# Patient Record
Sex: FEMALE | Race: WHITE | NOT HISPANIC OR LATINO
[De-identification: names, ages, dates, MRNs, and addresses within clinical notes are randomized per-mention and may not be internally consistent; named-entity substitution may affect disease eponyms.]

---

## 2017-07-14 ENCOUNTER — APPOINTMENT (OUTPATIENT)
Dept: OTOLARYNGOLOGY | Facility: CLINIC | Age: 82
End: 2017-07-14

## 2017-07-14 VITALS — BODY MASS INDEX: 26.21 KG/M2 | RESPIRATION RATE: 18 BRPM | WEIGHT: 130 LBS | HEIGHT: 59 IN

## 2017-07-14 DIAGNOSIS — Z83.3 FAMILY HISTORY OF DIABETES MELLITUS: ICD-10-CM

## 2017-07-14 DIAGNOSIS — Z82.3 FAMILY HISTORY OF STROKE: ICD-10-CM

## 2017-07-14 DIAGNOSIS — E11.22 TYPE 2 DIABETES MELLITUS WITH DIABETIC CHRONIC KIDNEY DISEASE: ICD-10-CM

## 2017-07-14 DIAGNOSIS — I10 ESSENTIAL (PRIMARY) HYPERTENSION: ICD-10-CM

## 2017-07-14 DIAGNOSIS — R04.0 EPISTAXIS: ICD-10-CM

## 2017-07-14 DIAGNOSIS — Z87.891 PERSONAL HISTORY OF NICOTINE DEPENDENCE: ICD-10-CM

## 2017-07-14 RX ORDER — AMLODIPINE BESYLATE 5 MG/1
TABLET ORAL
Refills: 0 | Status: ACTIVE | COMMUNITY

## 2017-07-14 RX ORDER — GLIMEPIRIDE 4 MG/1
TABLET ORAL
Refills: 0 | Status: ACTIVE | COMMUNITY

## 2017-07-14 RX ORDER — TRAZODONE HYDROCHLORIDE 300 MG/1
TABLET ORAL
Refills: 0 | Status: ACTIVE | COMMUNITY

## 2017-07-14 RX ORDER — OMEPRAZOLE 20 MG/1
TABLET, DELAYED RELEASE ORAL
Refills: 0 | Status: ACTIVE | COMMUNITY

## 2017-07-17 PROBLEM — R04.0 EPISTAXIS, RECURRENT: Status: ACTIVE | Noted: 2017-07-17

## 2017-11-17 ENCOUNTER — INPATIENT (INPATIENT)
Facility: HOSPITAL | Age: 82
LOS: 3 days | Discharge: HOME | DRG: 312 | End: 2017-11-21
Attending: INTERNAL MEDICINE | Admitting: INTERNAL MEDICINE
Payer: MEDICARE

## 2017-11-17 ENCOUNTER — EMERGENCY (EMERGENCY)
Facility: HOSPITAL | Age: 82
LOS: 1 days | Discharge: ANOTHER HOSPITAL | End: 2017-11-17
Attending: EMERGENCY MEDICINE | Admitting: EMERGENCY MEDICINE
Payer: MEDICARE

## 2017-11-17 VITALS
RESPIRATION RATE: 16 BRPM | OXYGEN SATURATION: 99 % | DIASTOLIC BLOOD PRESSURE: 80 MMHG | HEART RATE: 86 BPM | SYSTOLIC BLOOD PRESSURE: 155 MMHG | TEMPERATURE: 98 F

## 2017-11-17 VITALS
HEART RATE: 93 BPM | RESPIRATION RATE: 14 BRPM | HEIGHT: 59 IN | OXYGEN SATURATION: 96 % | DIASTOLIC BLOOD PRESSURE: 83 MMHG | TEMPERATURE: 98 F | SYSTOLIC BLOOD PRESSURE: 199 MMHG | WEIGHT: 130.07 LBS

## 2017-11-17 VITALS
HEART RATE: 71 BPM | RESPIRATION RATE: 18 BRPM | SYSTOLIC BLOOD PRESSURE: 165 MMHG | OXYGEN SATURATION: 97 % | DIASTOLIC BLOOD PRESSURE: 70 MMHG

## 2017-11-17 LAB
ALBUMIN SERPL ELPH-MCNC: 3.2 G/DL — LOW (ref 3.3–5)
ALP SERPL-CCNC: 52 U/L — SIGNIFICANT CHANGE UP (ref 40–120)
ALT FLD-CCNC: 23 U/L — SIGNIFICANT CHANGE UP (ref 12–78)
ANION GAP SERPL CALC-SCNC: 8 MMOL/L — SIGNIFICANT CHANGE UP (ref 5–17)
APTT BLD: 28.8 SEC — SIGNIFICANT CHANGE UP (ref 27.5–37.4)
AST SERPL-CCNC: 18 U/L — SIGNIFICANT CHANGE UP (ref 15–37)
BASOPHILS # BLD AUTO: 0.1 K/UL — SIGNIFICANT CHANGE UP (ref 0–0.2)
BASOPHILS NFR BLD AUTO: 1 % — SIGNIFICANT CHANGE UP (ref 0–2)
BILIRUB SERPL-MCNC: 0.3 MG/DL — SIGNIFICANT CHANGE UP (ref 0.2–1.2)
BUN SERPL-MCNC: 30 MG/DL — HIGH (ref 7–23)
CALCIUM SERPL-MCNC: 9.4 MG/DL — SIGNIFICANT CHANGE UP (ref 8.5–10.1)
CHLORIDE SERPL-SCNC: 103 MMOL/L — SIGNIFICANT CHANGE UP (ref 96–108)
CK MB BLD-MCNC: 1.5 % — SIGNIFICANT CHANGE UP (ref 0–3.5)
CK MB CFR SERPL CALC: 2.3 NG/ML — SIGNIFICANT CHANGE UP (ref 0–3.6)
CK SERPL-CCNC: 156 U/L — SIGNIFICANT CHANGE UP (ref 26–192)
CO2 SERPL-SCNC: 27 MMOL/L — SIGNIFICANT CHANGE UP (ref 22–31)
CREAT SERPL-MCNC: 1.9 MG/DL — HIGH (ref 0.5–1.3)
EOSINOPHIL # BLD AUTO: 0.1 K/UL — SIGNIFICANT CHANGE UP (ref 0–0.5)
EOSINOPHIL NFR BLD AUTO: 1.1 % — SIGNIFICANT CHANGE UP (ref 0–6)
GLUCOSE SERPL-MCNC: 138 MG/DL — HIGH (ref 70–99)
HCT VFR BLD CALC: 31.6 % — LOW (ref 34.5–45)
HGB BLD-MCNC: 10.2 G/DL — LOW (ref 11.5–15.5)
INR BLD: 1.07 RATIO — SIGNIFICANT CHANGE UP (ref 0.88–1.16)
LIDOCAIN IGE QN: 129 U/L — SIGNIFICANT CHANGE UP (ref 73–393)
LYMPHOCYTES # BLD AUTO: 1 K/UL — SIGNIFICANT CHANGE UP (ref 1–3.3)
LYMPHOCYTES # BLD AUTO: 9 % — LOW (ref 13–44)
MCHC RBC-ENTMCNC: 29.2 PG — SIGNIFICANT CHANGE UP (ref 27–34)
MCHC RBC-ENTMCNC: 32.3 GM/DL — SIGNIFICANT CHANGE UP (ref 32–36)
MCV RBC AUTO: 90.2 FL — SIGNIFICANT CHANGE UP (ref 80–100)
MONOCYTES # BLD AUTO: 0.8 K/UL — SIGNIFICANT CHANGE UP (ref 0–0.9)
MONOCYTES NFR BLD AUTO: 7.1 % — SIGNIFICANT CHANGE UP (ref 1–9)
NEUTROPHILS # BLD AUTO: 8.7 K/UL — HIGH (ref 1.8–7.4)
NEUTROPHILS NFR BLD AUTO: 81.7 % — HIGH (ref 43–77)
PLATELET # BLD AUTO: 339 K/UL — SIGNIFICANT CHANGE UP (ref 150–400)
POTASSIUM SERPL-MCNC: 4.6 MMOL/L — SIGNIFICANT CHANGE UP (ref 3.5–5.3)
POTASSIUM SERPL-SCNC: 4.6 MMOL/L — SIGNIFICANT CHANGE UP (ref 3.5–5.3)
PROT SERPL-MCNC: 6.6 G/DL — SIGNIFICANT CHANGE UP (ref 6–8.3)
PROTHROM AB SERPL-ACNC: 11.7 SEC — SIGNIFICANT CHANGE UP (ref 9.8–12.7)
RBC # BLD: 3.51 M/UL — LOW (ref 3.8–5.2)
RBC # FLD: 12.7 % — SIGNIFICANT CHANGE UP (ref 10.3–14.5)
SODIUM SERPL-SCNC: 138 MMOL/L — SIGNIFICANT CHANGE UP (ref 135–145)
TROPONIN I SERPL-MCNC: <.015 NG/ML — SIGNIFICANT CHANGE UP (ref 0.01–0.04)
WBC # BLD: 10.7 K/UL — HIGH (ref 3.8–10.5)
WBC # FLD AUTO: 10.7 K/UL — HIGH (ref 3.8–10.5)

## 2017-11-17 PROCEDURE — 70450 CT HEAD/BRAIN W/O DYE: CPT | Mod: 26

## 2017-11-17 PROCEDURE — 82553 CREATINE MB FRACTION: CPT

## 2017-11-17 PROCEDURE — 36415 COLL VENOUS BLD VENIPUNCTURE: CPT

## 2017-11-17 PROCEDURE — 82962 GLUCOSE BLOOD TEST: CPT

## 2017-11-17 PROCEDURE — 84484 ASSAY OF TROPONIN QUANT: CPT

## 2017-11-17 PROCEDURE — 85027 COMPLETE CBC AUTOMATED: CPT

## 2017-11-17 PROCEDURE — 99285 EMERGENCY DEPT VISIT HI MDM: CPT | Mod: 25

## 2017-11-17 PROCEDURE — 72125 CT NECK SPINE W/O DYE: CPT

## 2017-11-17 PROCEDURE — 99285 EMERGENCY DEPT VISIT HI MDM: CPT | Mod: GC

## 2017-11-17 PROCEDURE — 93005 ELECTROCARDIOGRAM TRACING: CPT

## 2017-11-17 PROCEDURE — 85610 PROTHROMBIN TIME: CPT

## 2017-11-17 PROCEDURE — 83690 ASSAY OF LIPASE: CPT

## 2017-11-17 PROCEDURE — 80053 COMPREHEN METABOLIC PANEL: CPT

## 2017-11-17 PROCEDURE — 99285 EMERGENCY DEPT VISIT HI MDM: CPT

## 2017-11-17 PROCEDURE — 82550 ASSAY OF CK (CPK): CPT

## 2017-11-17 PROCEDURE — 71250 CT THORAX DX C-: CPT | Mod: 26

## 2017-11-17 PROCEDURE — 70450 CT HEAD/BRAIN W/O DYE: CPT

## 2017-11-17 PROCEDURE — 72125 CT NECK SPINE W/O DYE: CPT | Mod: 26

## 2017-11-17 PROCEDURE — 90471 IMMUNIZATION ADMIN: CPT

## 2017-11-17 PROCEDURE — 90715 TDAP VACCINE 7 YRS/> IM: CPT

## 2017-11-17 PROCEDURE — 85730 THROMBOPLASTIN TIME PARTIAL: CPT

## 2017-11-17 PROCEDURE — 71250 CT THORAX DX C-: CPT

## 2017-11-17 RX ORDER — TETANUS TOXOID, REDUCED DIPHTHERIA TOXOID AND ACELLULAR PERTUSSIS VACCINE, ADSORBED 5; 2.5; 8; 8; 2.5 [IU]/.5ML; [IU]/.5ML; UG/.5ML; UG/.5ML; UG/.5ML
0.5 SUSPENSION INTRAMUSCULAR ONCE
Qty: 0 | Refills: 0 | Status: COMPLETED | OUTPATIENT
Start: 2017-11-17 | End: 2017-11-17

## 2017-11-17 RX ADMIN — TETANUS TOXOID, REDUCED DIPHTHERIA TOXOID AND ACELLULAR PERTUSSIS VACCINE, ADSORBED 0.5 MILLILITER(S): 5; 2.5; 8; 8; 2.5 SUSPENSION INTRAMUSCULAR at 22:40

## 2017-11-17 NOTE — ED ADULT NURSE NOTE - CHIEF COMPLAINT QUOTE
syncopal episode while at home.  evidence of head injury from fall, laceration under chin, bleeding from ear. patient believes it was from hypoglycemia

## 2017-11-17 NOTE — ED ADULT TRIAGE NOTE - CHIEF COMPLAINT QUOTE
syncopal episode while at home.  evidence of head injury from fall, laceration under chin, bleeding from ear. patient believes it was from hypoglycemia syncopal episode while at home.  evidence of head injury from fall, laceration under chin, bleeding from ear. patient believes it was from hypoglycemia.  FS in triage 168

## 2017-11-17 NOTE — ED PROVIDER NOTE - OBJECTIVE STATEMENT
83 yo female s/p episode of syncope after getting up from bathroom this morning around 6am, landed on left side of head/ear, has been having bleeding in her left ear entire day.  +LOC.  Also c/o chest wall pain.  No anticoagulant use. 83 yo female s/p episode of syncope after getting up from bathroom this morning around 6am, landed on left side of head/ear, has been having bleeding in her left ear entire day.  +LOC.  Also c/o chest wall pain.  No anticoagulant use. Tetanus not up to date

## 2017-11-17 NOTE — ED PROVIDER NOTE - MEDICAL DECISION MAKING DETAILS
CT shows complicated mandibular fracture involving external auditory canal, right rib fracture, trauma eval at [place], likely admit for syncopal workup as well

## 2017-11-17 NOTE — ED ADULT TRIAGE NOTE - ARRIVAL INFO ADDITIONAL COMMENTS
PT transferred from [place] with mandibular fx and R 6th rib fracture s/p sycopal episode with fall.

## 2017-11-17 NOTE — ED PROVIDER NOTE - ENMT, MLM
Airway patent, Nasal mucosa clear. Mouth with normal mucosa. Throat has no vesicles, no oropharyngeal exudates and uvula is midline.  Left ear +bleeding in canal, unable to visualize TM, right TM clear. Airway patent, Nasal mucosa clear. Mouth with normal mucosa. Throat has no vesicles, no oropharyngeal exudates and uvula is midline.  Left ear +bleeding in canal, unable to visualize TM possible bone exposure?, right TM clear.

## 2017-11-17 NOTE — ED PROVIDER NOTE - ENMT NEGATIVE STATEMENT, MLM
+left ear bleeding Nose: no nasal congestion and no nasal drainage.Mouth/Throat: no dysphagia, no hoarseness and no throat pain.Neck: no lumps, no pain, no stiffness and no swollen glands.

## 2017-11-17 NOTE — ED PROVIDER NOTE - CARE PLAN
Principal Discharge DX:	Mandibular fracture  Secondary Diagnosis:	Ear bleeding, left  Secondary Diagnosis:	Closed fracture of one rib of right side, initial encounter

## 2017-11-18 DIAGNOSIS — I10 ESSENTIAL (PRIMARY) HYPERTENSION: ICD-10-CM

## 2017-11-18 DIAGNOSIS — R55 SYNCOPE AND COLLAPSE: ICD-10-CM

## 2017-11-18 DIAGNOSIS — E16.2 HYPOGLYCEMIA, UNSPECIFIED: ICD-10-CM

## 2017-11-18 DIAGNOSIS — H92.22 OTORRHAGIA, LEFT EAR: ICD-10-CM

## 2017-11-18 DIAGNOSIS — K58.0 IRRITABLE BOWEL SYNDROME WITH DIARRHEA: ICD-10-CM

## 2017-11-18 DIAGNOSIS — Z79.899 OTHER LONG TERM (CURRENT) DRUG THERAPY: ICD-10-CM

## 2017-11-18 DIAGNOSIS — S02.609B FRACTURE OF MANDIBLE, UNSPECIFIED, INITIAL ENCOUNTER FOR OPEN FRACTURE: ICD-10-CM

## 2017-11-18 DIAGNOSIS — N18.9 CHRONIC KIDNEY DISEASE, UNSPECIFIED: ICD-10-CM

## 2017-11-18 LAB
GLUCOSE BLDC GLUCOMTR-MCNC: 102 MG/DL — HIGH (ref 70–99)
GLUCOSE BLDC GLUCOMTR-MCNC: 113 MG/DL — HIGH (ref 70–99)
GLUCOSE BLDC GLUCOMTR-MCNC: 122 MG/DL — HIGH (ref 70–99)
GLUCOSE BLDC GLUCOMTR-MCNC: 124 MG/DL — HIGH (ref 70–99)
TROPONIN T SERPL-MCNC: <0.01 NG/ML — SIGNIFICANT CHANGE UP (ref 0–0.06)

## 2017-11-18 PROCEDURE — 72170 X-RAY EXAM OF PELVIS: CPT | Mod: 26

## 2017-11-18 PROCEDURE — 99223 1ST HOSP IP/OBS HIGH 75: CPT

## 2017-11-18 PROCEDURE — 70486 CT MAXILLOFACIAL W/O DYE: CPT | Mod: 26

## 2017-11-18 RX ORDER — SODIUM CHLORIDE 9 MG/ML
1000 INJECTION, SOLUTION INTRAVENOUS
Qty: 0 | Refills: 0 | Status: DISCONTINUED | OUTPATIENT
Start: 2017-11-18 | End: 2017-11-19

## 2017-11-18 RX ORDER — DEXTROSE 50 % IN WATER 50 %
25 SYRINGE (ML) INTRAVENOUS ONCE
Qty: 0 | Refills: 0 | Status: DISCONTINUED | OUTPATIENT
Start: 2017-11-18 | End: 2017-11-21

## 2017-11-18 RX ORDER — TRAZODONE HCL 50 MG
75 TABLET ORAL AT BEDTIME
Qty: 0 | Refills: 0 | Status: DISCONTINUED | OUTPATIENT
Start: 2017-11-18 | End: 2017-11-21

## 2017-11-18 RX ORDER — MORPHINE SULFATE 50 MG/1
2 CAPSULE, EXTENDED RELEASE ORAL ONCE
Qty: 0 | Refills: 0 | Status: DISCONTINUED | OUTPATIENT
Start: 2017-11-18 | End: 2017-11-18

## 2017-11-18 RX ORDER — FERROUS SULFATE 325(65) MG
325 TABLET ORAL DAILY
Qty: 0 | Refills: 0 | Status: DISCONTINUED | OUTPATIENT
Start: 2017-11-18 | End: 2017-11-21

## 2017-11-18 RX ORDER — INSULIN LISPRO 100/ML
VIAL (ML) SUBCUTANEOUS
Qty: 0 | Refills: 0 | Status: DISCONTINUED | OUTPATIENT
Start: 2017-11-18 | End: 2017-11-21

## 2017-11-18 RX ORDER — OXYCODONE AND ACETAMINOPHEN 5; 325 MG/1; MG/1
1 TABLET ORAL EVERY 4 HOURS
Qty: 0 | Refills: 0 | Status: DISCONTINUED | OUTPATIENT
Start: 2017-11-18 | End: 2017-11-21

## 2017-11-18 RX ORDER — INSULIN LISPRO 100/ML
VIAL (ML) SUBCUTANEOUS AT BEDTIME
Qty: 0 | Refills: 0 | Status: DISCONTINUED | OUTPATIENT
Start: 2017-11-18 | End: 2017-11-21

## 2017-11-18 RX ORDER — MORPHINE SULFATE 50 MG/1
2 CAPSULE, EXTENDED RELEASE ORAL EVERY 4 HOURS
Qty: 0 | Refills: 0 | Status: DISCONTINUED | OUTPATIENT
Start: 2017-11-18 | End: 2017-11-20

## 2017-11-18 RX ORDER — SODIUM CHLORIDE 9 MG/ML
1000 INJECTION, SOLUTION INTRAVENOUS
Qty: 0 | Refills: 0 | Status: DISCONTINUED | OUTPATIENT
Start: 2017-11-18 | End: 2017-11-21

## 2017-11-18 RX ORDER — LISINOPRIL 2.5 MG/1
5 TABLET ORAL DAILY
Qty: 0 | Refills: 0 | Status: DISCONTINUED | OUTPATIENT
Start: 2017-11-18 | End: 2017-11-20

## 2017-11-18 RX ORDER — SODIUM CHLORIDE 9 MG/ML
1000 INJECTION, SOLUTION INTRAVENOUS
Qty: 0 | Refills: 0 | Status: DISCONTINUED | OUTPATIENT
Start: 2017-11-18 | End: 2017-11-18

## 2017-11-18 RX ORDER — DEXTROSE 50 % IN WATER 50 %
1 SYRINGE (ML) INTRAVENOUS ONCE
Qty: 0 | Refills: 0 | Status: DISCONTINUED | OUTPATIENT
Start: 2017-11-18 | End: 2017-11-21

## 2017-11-18 RX ORDER — PREGABALIN 225 MG/1
100 CAPSULE ORAL DAILY
Qty: 0 | Refills: 0 | Status: DISCONTINUED | OUTPATIENT
Start: 2017-11-18 | End: 2017-11-21

## 2017-11-18 RX ORDER — GLUCAGON INJECTION, SOLUTION 0.5 MG/.1ML
1 INJECTION, SOLUTION SUBCUTANEOUS ONCE
Qty: 0 | Refills: 0 | Status: DISCONTINUED | OUTPATIENT
Start: 2017-11-18 | End: 2017-11-21

## 2017-11-18 RX ORDER — DEXTROSE 50 % IN WATER 50 %
12.5 SYRINGE (ML) INTRAVENOUS ONCE
Qty: 0 | Refills: 0 | Status: DISCONTINUED | OUTPATIENT
Start: 2017-11-18 | End: 2017-11-21

## 2017-11-18 RX ORDER — ACETAMINOPHEN 500 MG
650 TABLET ORAL EVERY 6 HOURS
Qty: 0 | Refills: 0 | Status: DISCONTINUED | OUTPATIENT
Start: 2017-11-18 | End: 2017-11-21

## 2017-11-18 RX ORDER — PANTOPRAZOLE SODIUM 20 MG/1
40 TABLET, DELAYED RELEASE ORAL
Qty: 0 | Refills: 0 | Status: DISCONTINUED | OUTPATIENT
Start: 2017-11-18 | End: 2017-11-21

## 2017-11-18 RX ORDER — CHOLECALCIFEROL (VITAMIN D3) 125 MCG
1000 CAPSULE ORAL DAILY
Qty: 0 | Refills: 0 | Status: DISCONTINUED | OUTPATIENT
Start: 2017-11-18 | End: 2017-11-21

## 2017-11-18 RX ADMIN — OXYCODONE AND ACETAMINOPHEN 1 TABLET(S): 5; 325 TABLET ORAL at 22:29

## 2017-11-18 RX ADMIN — MORPHINE SULFATE 2 MILLIGRAM(S): 50 CAPSULE, EXTENDED RELEASE ORAL at 06:45

## 2017-11-18 RX ADMIN — Medication 1000 UNIT(S): at 10:23

## 2017-11-18 RX ADMIN — SODIUM CHLORIDE 100 MILLILITER(S): 9 INJECTION, SOLUTION INTRAVENOUS at 02:23

## 2017-11-18 RX ADMIN — Medication 1 TABLET(S): at 11:39

## 2017-11-18 RX ADMIN — OXYCODONE AND ACETAMINOPHEN 1 TABLET(S): 5; 325 TABLET ORAL at 13:27

## 2017-11-18 RX ADMIN — MORPHINE SULFATE 2 MILLIGRAM(S): 50 CAPSULE, EXTENDED RELEASE ORAL at 06:15

## 2017-11-18 RX ADMIN — Medication 75 MILLIGRAM(S): at 21:46

## 2017-11-18 RX ADMIN — MORPHINE SULFATE 2 MILLIGRAM(S): 50 CAPSULE, EXTENDED RELEASE ORAL at 03:24

## 2017-11-18 RX ADMIN — MORPHINE SULFATE 2 MILLIGRAM(S): 50 CAPSULE, EXTENDED RELEASE ORAL at 02:23

## 2017-11-18 RX ADMIN — Medication 325 MILLIGRAM(S): at 10:24

## 2017-11-18 RX ADMIN — OXYCODONE AND ACETAMINOPHEN 1 TABLET(S): 5; 325 TABLET ORAL at 14:12

## 2017-11-18 RX ADMIN — OXYCODONE AND ACETAMINOPHEN 1 TABLET(S): 5; 325 TABLET ORAL at 23:30

## 2017-11-18 RX ADMIN — PANTOPRAZOLE SODIUM 40 MILLIGRAM(S): 20 TABLET, DELAYED RELEASE ORAL at 06:30

## 2017-11-18 RX ADMIN — PREGABALIN 100 MICROGRAM(S): 225 CAPSULE ORAL at 11:39

## 2017-11-18 RX ADMIN — SODIUM CHLORIDE 50 MILLILITER(S): 9 INJECTION, SOLUTION INTRAVENOUS at 06:32

## 2017-11-18 RX ADMIN — LISINOPRIL 5 MILLIGRAM(S): 2.5 TABLET ORAL at 06:30

## 2017-11-18 NOTE — CONSULT NOTE ADULT - SUBJECTIVE AND OBJECTIVE BOX
HISTORY OF PRESENT ILLNESS:  82F s/p syncopal fall in bathroom.    PAST MEDICAL / SURGICAL HISTORY:  ·	Chronic kidney disease, unspecified CKD stage    ·	Diabetes Mellitus Type II    ·	GERD (Gastroesophageal Reflux Disease)    ·	Hyperlipemia    ·	Hypertension    ·	Irritable bowel syndrome with diarrhea    ·	Orthostatic hypotension.    HOME MEDICATIONS:   · 	glimepiride 1 mg oral tablet: 1 tab(s) orally 2 times a day, Last Dose Taken:    · 	amLODIPine 2.5 mg oral tablet: 1 tab(s) orally once a day, Last Dose Taken:    · 	ramipril 1.25 mg oral tablet: 1 tab(s) orally every other day, Last Dose Taken:    · 	ferrous sulfate: 1 tab(s) orally 2 times a day, Last Dose Taken:    · 	cyanocobalamin: 1 tab(s) orally once a day, Last Dose Taken:    · 	metFORMIN 500 mg oral tablet, extended release: 1 tab(s) orally once a day, Last Dose Taken:    · 	traZODone 150 mg oral tablet: 0.5 tab(s) orally once a day (at bedtime) then at 3AM if with insomnia, Last Dose Taken:    · 	omeprazole 20 mg oral delayed release capsule: 1 cap(s) orally once a day, Last Dose Taken:    · 	Vitamin D3 2000 intl units oral tablet: 1 tab(s) orally once a day, Last Dose Taken:      ALLERGIES:   No Known Drug Allergies     SOCIAL HISTORY:  The patient denies alcohol use.  The patient denies current smoking. Previously smoked for more than 40 years. Quit in 1990's.  The patient denies recreational drug use.    REVIEW OF SYSTEMS:  The patient complains of pain with extreme opening of the mouth.  The patient denies headache; changes in vision (including spots and diplopia); nasal drainage; ear drainage; numbness and paresthesias; changes in occlusion; weakness; and neck pain.     PHYSICAL EXAM:  -- VITAL SIGNS: Afebrile. Stable.  -- CONSTITUTIONAL: AOx3. NAD.   -- HEENT:        The upper third of the face demonstrates no step-offs, tenderness to palpation, or crepitus. No evidence of enophthalmos or vertical dystopia. No chemosis or subconjunctival hemorrhage.        The middle third of the face demonstrates no step-offs, tenderness to palpation, or crepitus. Examination of the ears is remarkable on the left for blood in the EAC. No Polo’s sign. Intranasal examination is unremarkable bilaterally: there is no evidence of acute or active bleeding or septal hematoma.        The lower third of the face demonstrates no step-offs, tenderness to palpation, or crepitus. Intraoral examination is unremarkable. The patient has a bridge on the mandibular teeth. The patient is endentulous with respect to the maxillary teeth. Poor oral hygiene. TTP over left TMJ. Maximal incisal opening 4-5cm.  -- NECK: C-collar in place. Soft. No tenderness to palpation.  -- CARDIOVASCULAR: Regular rate and rhythm. S1, S2.  -- RESPIRATORY: Bilateral breath sounds.   -- ABDOMEN: Soft. Nontender. Nondistended.  -- NEUROLOGICAL: Pupils are equal, round, and reactive to light and accommodation bilaterally. Visual fields intact by confrontation bilaterally. Extraocular movements intact to all directions. Facial motor intact and symmetric bilaterally. Facial sensory intact and symmetric bilaterally. CN 8-10 intact. Shoulder shrug equal and symmetric bilaterally. Tongue protrudes in midline. Motor and sensory are grossly intact in bilateral upper and lower extremities.     IMAGING:  CT scan of the head was performed on 11/17/2017 and demonstrated no acute intracranial hemorrhage; acute fracture involving the posterior inferior wall of the left mandibular fossa/anterior wall of the external auditory canal with associated blood products within the external auditory canal; additional mildly comminuted relatively nondisplaced fracture of the mandibular head without temporomandibular joint dislocation.    CT cervical spine was performed on 11/17/2017 and demonstrated no acute cervical spinal fracture or evidence of traumatic malalignment. HISTORY OF PRESENT ILLNESS:  82F s/p syncopal fall in bathroom.    PAST MEDICAL / SURGICAL HISTORY:  ·	Chronic kidney disease, unspecified CKD stage    ·	Diabetes Mellitus Type II    ·	GERD (Gastroesophageal Reflux Disease)    ·	Hyperlipemia    ·	Hypertension    ·	Irritable bowel syndrome with diarrhea    ·	Orthostatic hypotension.    HOME MEDICATIONS:   · 	glimepiride 1 mg oral tablet: 1 tab(s) orally 2 times a day, Last Dose Taken:    · 	amLODIPine 2.5 mg oral tablet: 1 tab(s) orally once a day, Last Dose Taken:    · 	ramipril 1.25 mg oral tablet: 1 tab(s) orally every other day, Last Dose Taken:    · 	ferrous sulfate: 1 tab(s) orally 2 times a day, Last Dose Taken:    · 	cyanocobalamin: 1 tab(s) orally once a day, Last Dose Taken:    · 	metFORMIN 500 mg oral tablet, extended release: 1 tab(s) orally once a day, Last Dose Taken:    · 	traZODone 150 mg oral tablet: 0.5 tab(s) orally once a day (at bedtime) then at 3AM if with insomnia, Last Dose Taken:    · 	omeprazole 20 mg oral delayed release capsule: 1 cap(s) orally once a day, Last Dose Taken:    · 	Vitamin D3 2000 intl units oral tablet: 1 tab(s) orally once a day, Last Dose Taken:      ALLERGIES:   No Known Drug Allergies     SOCIAL HISTORY:  The patient denies alcohol use.  The patient denies current smoking. Previously smoked for more than 40 years. Quit in 1990's.  The patient denies recreational drug use.    REVIEW OF SYSTEMS:  The patient complains of pain with extreme opening of the mouth.  The patient denies headache; changes in vision (including spots and diplopia); nasal drainage; ear drainage; numbness and paresthesias; changes in occlusion; weakness; and neck pain.     PHYSICAL EXAM:  -- VITAL SIGNS: Afebrile. Stable.  -- CONSTITUTIONAL: AOx3. NAD.   -- HEENT:        The upper third of the face demonstrates no step-offs, tenderness to palpation, or crepitus. No evidence of enophthalmos or vertical dystopia. No chemosis or subconjunctival hemorrhage.        The middle third of the face demonstrates no step-offs, tenderness to palpation, or crepitus. Examination of the ears is remarkable on the left for blood in the EAC. No Polo’s sign. Intranasal examination is unremarkable bilaterally: there is no evidence of acute or active bleeding or septal hematoma.        The lower third of the face demonstrates no step-offs, tenderness to palpation, or crepitus. Intraoral examination is unremarkable. The patient has a bridge on the mandibular teeth. The patient is endentulous with respect to the maxillary teeth. Poor oral hygiene. TTP over left TMJ. Maximal incisal opening 4-5cm.  -- NECK: Soft. No tenderness to palpation.  -- CARDIOVASCULAR: Regular rate and rhythm. S1, S2.  -- RESPIRATORY: Bilateral breath sounds.   -- ABDOMEN: Soft. Nontender. Nondistended.  -- NEUROLOGICAL: Pupils are equal, round, and reactive to light and accommodation bilaterally. Visual fields intact by confrontation bilaterally. Extraocular movements intact to all directions. Facial motor intact and symmetric bilaterally. Facial sensory intact and symmetric bilaterally. CN 8-10 intact. Shoulder shrug equal and symmetric bilaterally. Tongue protrudes in midline. Motor and sensory are grossly intact in bilateral upper and lower extremities.     IMAGING:  CT scan of the head was performed on 11/17/2017 and demonstrated no acute intracranial hemorrhage; acute fracture involving the posterior inferior wall of the left mandibular fossa/anterior wall of the external auditory canal with associated blood products within the external auditory canal; additional mildly comminuted relatively nondisplaced fracture of the mandibular head without temporomandibular joint dislocation.    CT cervical spine was performed on 11/17/2017 and demonstrated no acute cervical spinal fracture or evidence of traumatic malalignment.

## 2017-11-18 NOTE — ED PROVIDER NOTE - OBJECTIVE STATEMENT
81yo F with syncopal episode this AM, woke up felt nauseaus went to bathroom after urinating stood up and felt shaky, similar episodes with hypoglycemia in past, unable to make it to seat and passed out, woke up on own, unknown downtime. not on A/C. +pain to left jaw and rt chest wall. had similar episode last year with full work up determined likely from transient hypoglycemia, no cardiac abnormalities identified. patient transferred from [place] for rib fx and jaw fx. pain moderate worse with eating     PCP Dr Gann [phone]

## 2017-11-18 NOTE — H&P ADULT - PROBLEM SELECTOR PLAN 2
Pt with frequent episodes of hypoglycemia, and symptoms similar to hypoglycemic episodes prior to syncope.  Extremely concerning that patient's A1C is reportedly in low 6's and has been as low as 5's.  Given patient's age GOAL A1C SHOULD BE 8.0.  SHOULD NO LONGER BE ON ANY SULFONYLUREA.  Stressed importance of this to patient and daughter and explained appropriate goal blood sugars and A1C to patient.  DC all oral agents while inpatient, and given CKD would not resume metformin either, if patient requires diabetes medications can consider sitagliptin.

## 2017-11-18 NOTE — H&P ADULT - PROBLEM SELECTOR PLAN 4
Pt with mandibular fracture  OMFS consulted and pending evaluation  Also with fracture of fossa/anterior external auditory canal with ear bleeding, evaluated by ENT  Should start antibiotic ear drops on day 3 s/p fracture per ENT recs.

## 2017-11-18 NOTE — H&P ADULT - NSHPPHYSICALEXAM_GEN_ALL_CORE
Vital Signs Last 24 Hrs  T(C): 36.6 (18 Nov 2017 05:05), Max: 37.2 (18 Nov 2017 01:35)  T(F): 97.9 (18 Nov 2017 05:05), Max: 98.9 (18 Nov 2017 01:35)  HR: 82 (18 Nov 2017 05:05) (71 - 93)  BP: 160/79 (18 Nov 2017 05:05) (153/80 - 199/83)  BP(mean): --  RR: 18 (18 Nov 2017 05:05) (14 - 18)  SpO2: 95% (18 Nov 2017 05:05) (95% - 99%)    GENERAL: No acute distress, well-developed  HEAD:  Atraumatic, Normocephalic  EYES: EOMI, PERRLA, conjunctiva and sclera clear  ENT: Oral mucosa moist, left ear with gauze placed by ENT in canal, small amounts of blood on gauze. Left sided jaw ttp, though patient still able to speak normally and open jaw  NECK: Neck supple  CHEST/LUNG: Small amount of crackles in LLL that grossly cleared with coughing, No wheeze, no rales, no rhonchi.    Mild chest TTP in right lower rib area.   HEART: Regular rate and rhythm; No murmurs, rubs, or gallops  ABDOMEN: Soft, Nontender, Nondistended; Bowel sounds present  EXTREMITIES:  No clubbing, cyanosis. 1+ pitting edema in LE b/l  VASCULAR: Posterior tibialis pulses intact bilaterally  PSYCH: Normal behavior, normal affect  NEUROLOGY: AAOx3, normal gait  SKIN: grossly warm and dry

## 2017-11-18 NOTE — H&P ADULT - PROBLEM SELECTOR PLAN 5
No further diarrhea at this time  Maintain hydration when patient having diarrhea to reduce orthostasis

## 2017-11-18 NOTE — PATIENT PROFILE ADULT. - VISION (WITH CORRECTIVE LENSES IF THE PATIENT USUALLY WEARS THEM):
Partially impaired: cannot see medication labels or newsprint, but can see obstacles in path, and the surrounding layout; can count fingers at arm's length/patient has glasses on

## 2017-11-18 NOTE — H&P ADULT - PROBLEM SELECTOR PLAN 1
Pt here with syncope and collapse, given history most likely related to orthostatic hypotension.  Will check orthostatic VS, however even if this is negative most likely etiology would still be orthostatic hypotension.  However, patient also with frequent episodes of hypoglycemia and immediately prior to syncope had jitteriness that she feels is very similar to these episodes, unfortunately blood sugars were not measured at home and family had driven patient to [place] ED prior to xfer here, so unclear if this was true cause.  However, this must be addressed as below.   Monitor on telemetry  Check TTE especially given lower extremity edema as mentioned in HPI-however this may be related to new use of amlodipine.  Discontinue amlodipine as this is particularly likely to worsen orthostatic hypotension.   Monitor on telemetry.  Address polypharmacy as patient having numerous issues related to medication regimen.

## 2017-11-18 NOTE — H&P ADULT - NSHPSOCIALHISTORY_GEN_ALL_CORE
Former smoker, smoked 2ppd x 40 years=80 pack years, but quit x 27 years  no etoh use except occasionally at social events

## 2017-11-18 NOTE — ED ADULT NURSE NOTE - OBJECTIVE STATEMENT
transfer from [facility] by [facility] ems; pt fell at home friday am at 6:30 am following syncopal episode; pt thought it was due to hypoglycemia but bs was normal in [place] ed; pt has madibular fx and right lateral rib fx.  also has lac inside ear; minor bleeding has stopped; pt is fully alert and oriented transfer from [facility] by [facility] ems; pt fell at home friday am at 6:30 am following syncopal episode; pt thought it was due to hypoglycemia but bs was normal in [place] ed; pt has madibular fx and right lateral rib fx.  also has lac inside ear; minor bleeding has stopped; pt is fully alert and oriented x3; pt has gauze in left ear with scant blood; lac and abrasion on chin noted; placed on remote tele upon arrival

## 2017-11-18 NOTE — CONSULT NOTE ADULT - ASSESSMENT
81yo F with Mandibular fracture s/p fall from standing. Fall appears to have been syncopal. Her breathing is unlabored and she takes 1000 on the incentive spirometer.     - Rib fracture (R #6): multimodal pain control including lidoderm patch, acetaminophen, ibuprofen, and oxycodone   - Mandibular fracture: OMFS and ENT evaluating. Ciprodex to start in 3 days, 5 drops daily   - Syncope: work up per medicine  - Will D/W Dr. Gilda Guajardo   710 81yo F with Mandibular fracture s/p fall from standing. Fall appears to have been syncopal. Her breathing is unlabored and she takes 1000 on the incentive spirometer.     - Rib fracture (R #6): multimodal pain control including lidoderm patch, acetaminophen, ibuprofen, and oxycodone   - Mandibular fracture: OMFS and ENT evaluating. Ciprodex to start in 3 days, 5 drops daily   - Syncope: work up per medicine  - Will D/W Dr. Gilda Guajardo   [id number]

## 2017-11-18 NOTE — H&P ADULT - PROBLEM SELECTOR PLAN 3
Pt with numerous medications that are likely contributing to negative side effects and morbidity, including amlodipine (leg swelling and likely worsening orthostatic hypotension and causing fall), metformin (should not be on this as can cause lactic acidosis given pt's level of CKD), glimepiride (frequent hypoglycemia).  Will reduce patient's medication regimen.  Given her CKD, would benefit from daily ACE use instead of unusual EOD dosing of ACE and discontinue usage of amlodipine entirely.  Should NOT be resumed on glimepiride or metformin given extremely frequent hypoglycemia and dangerously low A1C as well as CKD.  Discussed with patient and daughter regarding these issues.

## 2017-11-18 NOTE — PATIENT PROFILE ADULT. - ALCOHOL USE HISTORY SINGLE SELECT
Pt. Is unable to perform test due to muscle tightness in the chest and neck area. Nurse Notified. Test no longer needed.   never

## 2017-11-18 NOTE — CHART NOTE - NSCHARTNOTEFT_GEN_A_CORE
Tertiary Trauma Survey (TTS)    Date of TTS: 11/18/2017                             Time: 13:35  Admit Date: 11/18/2017                             Trauma Activation: 3  Admit GCS: E- 4    V- 5    M- 6    HPI:  83yo female w/ PMhx of DM2, CKD (pt unsure of baseline Cr but reports was high enough that she had to be taken off metformin previously and that in last year it seemed to have been slightly worse-pt reports being placed on ER metformin at a low dose despite her CKD recently), HTN, IBS, osteoporosis int he past but no longer on medications and calcium supplements due to epistaxis?, HLD, GERD, hx of syncope and fall 1 year ago felt likely 2/2 orthostatic hypotension who now presents with syncope and fall with bleeding from ear and jaw pain and right sided rib pain. Pt reports that yesterday she had gotten up to use the bathroom as she was having an IBS flare over the last 1 day with about 2-3 episodes of diarrhea that day (loose but not watery stools). After having some diarrhea she stood up to leave the bathroom and began to feel lightheaded and dizzy without vertigo. She also felt jittery, which she reports was a similar sensation to when she has hypoglycemia with her DM2. She then passed out and woke up to find herself on the floor bleeding from the ear, with left sided jaw pain.  Pt also with right sided rib pain, no other painful areas.  Pt unsure how long she was out for. No chest pain, no palpitations, no SOB.  No known seizure like activity, no incontinence. No bleeding from tongue though family thinks she may have bruised tongue.    Of important note, patient reports that despite her diabetes her A1C is in the low 6 range, and had previously been in the 5's. She also reports frequent episodes of hypoglycemia, about 2 times per week that she measures with FS in the 70s. She also reports that prior to previous syncopal episode she does not believe she had a prodrome of lightheadedness however prior admission record from [place] indicate that she did have prodromal symptoms, so unclear if pt's memory is accurate of prior episode. Pt was also recently started on amlodipine a little over one month ago, and patient also had at similar timeframe onset of bilateral LE edema (mild). (18 Nov 2017 03:29)      PAST MEDICAL & SURGICAL HISTORY:  Chronic kidney disease, unspecified CKD stage  Orthostatic hypotension  Irritable bowel syndrome with diarrhea  GERD (Gastroesophageal Reflux Disease)  Diabetes Mellitus Type II  Hyperlipemia  Hypertension  S/P cataract surgery: left eye  Surgery, Elective: left arm surgery as child x 2    [  ] No significant past history as reviewed with the patient and family    FAMILY HISTORY:  No pertinent family history in first degree relatives    [  ] Family history not pertinent as reviewed with the patient and family    SOCIAL HISTORY:    Medications (inpatient): calcium carbonate  625 mG + Vitamin D (OsCal 250 + D) 1 Tablet(s) Oral daily  cholecalciferol 1000 Unit(s) Oral daily  cyanocobalamin 100 MICROGram(s) Oral daily  dextrose 5%. 1000 milliLiter(s) IV Continuous <Continuous>  dextrose 50% Injectable 12.5 Gram(s) IV Push once  dextrose 50% Injectable 25 Gram(s) IV Push once  dextrose 50% Injectable 25 Gram(s) IV Push once  ferrous    sulfate 325 milliGRAM(s) Oral daily  insulin lispro (HumaLOG) corrective regimen sliding scale   SubCutaneous three times a day before meals  insulin lispro (HumaLOG) corrective regimen sliding scale   SubCutaneous at bedtime  lisinopril 5 milliGRAM(s) Oral daily  pantoprazole    Tablet 40 milliGRAM(s) Oral before breakfast  sodium chloride 0.45%. 1000 milliLiter(s) IV Continuous <Continuous>  traZODone 75 milliGRAM(s) Oral at bedtime    Medications (PRN):acetaminophen   Tablet. 650 milliGRAM(s) Oral every 6 hours PRN  dextrose Gel 1 Dose(s) Oral once PRN  glucagon  Injectable 1 milliGRAM(s) IntraMuscular once PRN  morphine  - Injectable 2 milliGRAM(s) IV Push every 4 hours PRN  oxyCODONE    5 mG/acetaminophen 325 mG 1 Tablet(s) Oral every 4 hours PRN    Allergies: No Known Allergies  (Intolerances: )    Vital Signs Last 24 Hrs  T(C): 36.8 (18 Nov 2017 12:05), Max: 37.2 (18 Nov 2017 01:35)  T(F): 98.3 (18 Nov 2017 12:05), Max: 98.9 (18 Nov 2017 01:35)  HR: 64 (18 Nov 2017 12:05) (64 - 93)  BP: 164/74 (18 Nov 2017 12:05) (153/80 - 199/83)  BP(mean): --  RR: 18 (18 Nov 2017 12:05) (14 - 18)  SpO2: 95% (18 Nov 2017 12:05) (95% - 99%)  Drug Dosing Weight  Height (cm): 149.86 (18 Nov 2017 05:05)  Weight (kg): 62.6 (18 Nov 2017 05:05)  BMI (kg/m2): 27.9 (18 Nov 2017 05:05)  BSA (m2): 1.58 (18 Nov 2017 05:05)                          10.2   10.7  )-----------( 339      ( 17 Nov 2017 20:52 )             31.6     11-17    138  |  103  |  30<H>  ----------------------------<  138<H>  4.6   |  27  |  1.90<H>    Ca    9.4      17 Nov 2017 20:52    TPro  6.6  /  Alb  3.2<L>  /  TBili  0.3  /  DBili  x   /  AST  18  /  ALT  23  /  AlkPhos  52  11-17    PT/INR - ( 17 Nov 2017 22:38 )   PT: 11.7 sec;   INR: 1.07 ratio         PTT - ( 17 Nov 2017 22:38 )  PTT:28.8 sec      List Injuries Identified to Date:    List Operative and Interventional Radiological Procedures:     Consults (Date):  [ x ] Plastics - non-operative left mandibular condyle fracture, follow up with Dr. Crabtree 1 week after discharge  [ x ] ENT - non-operative, ciprodex ear drops x7 days, follow up as outpatient with Dr. Campbell.    RADIOLOGICAL FINDINGS REVIEW:  CXR:    Pelvis Films:     C-Spine Films:    T/L/S Spine Films:    Extremity Films:    Head CT:    C-Spine CT:    Neck CT:    Chest CT:    ABD/Pelvis CT:    Other:    Interpretation of Findings: Tertiary Trauma Survey (TTS)    Date of TTS: 11/18/2017                             Time: 13:35  Admit Date: 11/18/2017                             Trauma Activation: 3  Admit GCS: E- 4    V- 5    M- 6    HPI:  83yo female w/ PMhx of DM2, CKD (pt unsure of baseline Cr but reports was high enough that she had to be taken off metformin previously and that in last year it seemed to have been slightly worse-pt reports being placed on ER metformin at a low dose despite her CKD recently), HTN, IBS, osteoporosis int he past but no longer on medications and calcium supplements due to epistaxis?, HLD, GERD, hx of syncope and fall 1 year ago felt likely 2/2 orthostatic hypotension who now presents with syncope and fall with bleeding from ear and jaw pain and right sided rib pain. Pt reports that yesterday she had gotten up to use the bathroom as she was having an IBS flare over the last 1 day with about 2-3 episodes of diarrhea that day (loose but not watery stools). After having some diarrhea she stood up to leave the bathroom and began to feel lightheaded and dizzy without vertigo. She also felt jittery, which she reports was a similar sensation to when she has hypoglycemia with her DM2. She then passed out and woke up to find herself on the floor bleeding from the ear, with left sided jaw pain.  Pt also with right sided rib pain, no other painful areas.  Pt unsure how long she was out for. No chest pain, no palpitations, no SOB.  No known seizure like activity, no incontinence. No bleeding from tongue though family thinks she may have bruised tongue.    Of important note, patient reports that despite her diabetes her A1C is in the low 6 range, and had previously been in the 5's. She also reports frequent episodes of hypoglycemia, about 2 times per week that she measures with FS in the 70s. She also reports that prior to previous syncopal episode she does not believe she had a prodrome of lightheadedness however prior admission record from [place] indicate that she did have prodromal symptoms, so unclear if pt's memory is accurate of prior episode. Pt was also recently started on amlodipine a little over one month ago, and patient also had at similar timeframe onset of bilateral LE edema (mild). (18 Nov 2017 03:29)      PAST MEDICAL & SURGICAL HISTORY:  Chronic kidney disease, unspecified CKD stage  Orthostatic hypotension  Irritable bowel syndrome with diarrhea  GERD (Gastroesophageal Reflux Disease)  Diabetes Mellitus Type II  Hyperlipemia  Hypertension  S/P cataract surgery: left eye  Surgery, Elective: left arm surgery as child x 2    [  ] No significant past history as reviewed with the patient and family    FAMILY HISTORY:  No pertinent family history in first degree relatives    [  ] Family history not pertinent as reviewed with the patient and family    SOCIAL HISTORY:    Medications (inpatient): calcium carbonate  625 mG + Vitamin D (OsCal 250 + D) 1 Tablet(s) Oral daily  cholecalciferol 1000 Unit(s) Oral daily  cyanocobalamin 100 MICROGram(s) Oral daily  dextrose 5%. 1000 milliLiter(s) IV Continuous <Continuous>  dextrose 50% Injectable 12.5 Gram(s) IV Push once  dextrose 50% Injectable 25 Gram(s) IV Push once  dextrose 50% Injectable 25 Gram(s) IV Push once  ferrous    sulfate 325 milliGRAM(s) Oral daily  insulin lispro (HumaLOG) corrective regimen sliding scale   SubCutaneous three times a day before meals  insulin lispro (HumaLOG) corrective regimen sliding scale   SubCutaneous at bedtime  lisinopril 5 milliGRAM(s) Oral daily  pantoprazole    Tablet 40 milliGRAM(s) Oral before breakfast  sodium chloride 0.45%. 1000 milliLiter(s) IV Continuous <Continuous>  traZODone 75 milliGRAM(s) Oral at bedtime    Medications (PRN):acetaminophen   Tablet. 650 milliGRAM(s) Oral every 6 hours PRN  dextrose Gel 1 Dose(s) Oral once PRN  glucagon  Injectable 1 milliGRAM(s) IntraMuscular once PRN  morphine  - Injectable 2 milliGRAM(s) IV Push every 4 hours PRN  oxyCODONE    5 mG/acetaminophen 325 mG 1 Tablet(s) Oral every 4 hours PRN    Allergies: No Known Allergies  (Intolerances: )    Vital Signs Last 24 Hrs  T(C): 36.8 (18 Nov 2017 12:05), Max: 37.2 (18 Nov 2017 01:35)  T(F): 98.3 (18 Nov 2017 12:05), Max: 98.9 (18 Nov 2017 01:35)  HR: 64 (18 Nov 2017 12:05) (64 - 93)  BP: 164/74 (18 Nov 2017 12:05) (153/80 - 199/83)  BP(mean): --  RR: 18 (18 Nov 2017 12:05) (14 - 18)  SpO2: 95% (18 Nov 2017 12:05) (95% - 99%)  Drug Dosing Weight  Height (cm): 149.86 (18 Nov 2017 05:05)  Weight (kg): 62.6 (18 Nov 2017 05:05)  BMI (kg/m2): 27.9 (18 Nov 2017 05:05)  BSA (m2): 1.58 (18 Nov 2017 05:05)                          10.2   10.7  )-----------( 339      ( 17 Nov 2017 20:52 )             31.6     11-17    138  |  103  |  30<H>  ----------------------------<  138<H>  4.6   |  27  |  1.90<H>    Ca    9.4      17 Nov 2017 20:52    TPro  6.6  /  Alb  3.2<L>  /  TBili  0.3  /  DBili  x   /  AST  18  /  ALT  23  /  AlkPhos  52  11-17    PT/INR - ( 17 Nov 2017 22:38 )   PT: 11.7 sec;   INR: 1.07 ratio         PTT - ( 17 Nov 2017 22:38 )  PTT:28.8 sec      List Injuries Identified to Date: minimally displaced fractures of the left mandibular condylar head and posterior wall the left mandibular fossa with hemotympanum extending to the left middle ear, bony erosive irregularity along the right anteromedial maxillary alveolus, right 6th rib fracture    List Operative and Interventional Radiological Procedures:     Consults (Date):  [ x ] Plastics - non-operative left mandibular condyle fracture, follow up with Dr. Crabtree 1 week after discharge  [ x ] ENT - non-operative, ciprodex ear drops x7 days, follow up as outpatient with Dr. Campbell.    RADIOLOGICAL FINDINGS REVIEW:  CXR:    Pelvis Films:   11/18: No acute fracture or dislocation of the bony pelvis.    Head CT:  11/17: No acute intracranial hemorrhage. Acute fracture involving the posterior inferior wall of the left mandibular fossa/anterior wall of the external auditory canal with associated blood products within the external auditory canal. Additional mildly comminuted relatively nondisplaced fracture of the mandibular head. No temporomandibular joint dislocation.    C-Spine CT:  11/17: No acute cervical spinal fracture or evidence of traumatic malalignment.    Chest CT:  11/17: Acute mildly displaced right lateral sixth rib fracture. No pneumothorax.    ABD/Pelvis CT:    Maxillofacial CT:   (11/18): There are acute, minimally displaced fractures of the left mandibular condylar head and posterior wall the left mandibular fossa with left. hemotympanum extending to the left middle ear. Marked bony erosive irregularity along the right anteromedial maxillary alveolus, axial bone window series 4 image 57 suggest correlation visual inspection. Severe underlying dental disease with bony reactive changes along the mandibular isthmus and dehiscence of the lingual cortex adjacent to the left posterior mandible molar.    Interpretation of Findings: Facial fractures are non-operative per consultants. Will only require outpatient follow up. Rib fracture is also non-operative. Primary treatment is pain control.     PHYSICAL EXAM:  Constitutional:  - NAD    Eyes:  - EOMI    ENMT:  - Left mandibular tenderness without gross deformity  - Intact sensation  - No motor deficits   - Dried blood in left ear    Neck:  - FROM  - Nontender    Back:  - Nontender    Respiratory:  - Breathing comfortably  - Minimal right chest wall tenderness    Cardiovascular:  - Normotensive  - Regular rate     Gastrointestinal:  - Abdomen soft, nondistended, nontender    Neurological:  - AOx3  - Interactive  - Follows commands    Skin:  - Small abrasion below mandible without active bleeding or sign of infection    Musculoskeletal:  - FROM BUE and BLE  - No gross abnormality or tenderness of BUE or BLE

## 2017-11-18 NOTE — PATIENT PROFILE ADULT. - ABILITY TO HEAR (WITH HEARING AID OR HEARING APPLIANCE IF NORMALLY USED):
Left ear/Mildly to Moderately Impaired: difficulty hearing in some environments or speaker may need to increase volume or speak distinctly

## 2017-11-18 NOTE — H&P ADULT - HISTORY OF PRESENT ILLNESS
83yo female w/ PMhx of DM2, CKD (pt unsure of baseline Cr but reports was high enough that she had to be taken off metformin previously and that in last year it seemed to have been slightly worse-pt reports being placed on ER metformin at a low dose despite her CKD recently), HTN, HLD, GERD, hx of syncope and fall 1 year ago felt likely 2/2 orthostatic hypotension who now presents with syncope and fall with bleeding from ear and jaw pain and right sided rib pain. 81yo female w/ PMhx of DM2, CKD (pt unsure of baseline Cr but reports was high enough that she had to be taken off metformin previously and that in last year it seemed to have been slightly worse-pt reports being placed on ER metformin at a low dose despite her CKD recently), HTN, IBS, HLD, GERD, hx of syncope and fall 1 year ago felt likely 2/2 orthostatic hypotension who now presents with syncope and fall with bleeding from ear and jaw pain and right sided rib pain. Pt reports that yesterday she had gotten up to use the bathroom as she was having an IBS flare over the last 1 day with about 2-3 episodes of diarrhea that day (loose but not watery stools). After having some diarrhea she stood up to leave the bathroom and began to feel lightheaded and dizzy without vertigo. She also felt jittery, which she reports was a similar sensation to when she has hypoglycemia with her DM2. She then passed out and woke up to find herself on the floor bleeding from the ear, with left sided jaw pain.  Pt also with right sided rib pain, no other painful areas.  Pt unsure how long she was out for. No chest pain, no palpitations, no SOB.  No known seizure like activity, no incontinence. No bleeding from tongue though family thinks she may have bruised tongue.    Of important note, patient reports that despite her diabetes her A1C is in the low 6 range, and had previously been in the 5's. She also reports frequent episodes of hypoglycemia, about 2 times per week that she measures with FS in the 70s. She also reports that prior to previous syncopal episode she does not believe she had a prodrome of lightheadedness however prior admission record from [place] indicate that she did have prodromal symptoms, so unclear if pt's memory is accurate of prior episode. Pt was also recently started on amlodipine a little over one month ago, and patient also had at similar timeframe onset of bilateral LE edema (mild). 83yo female w/ PMhx of DM2, CKD (pt unsure of baseline Cr but reports was high enough that she had to be taken off metformin previously and that in last year it seemed to have been slightly worse-pt reports being placed on ER metformin at a low dose despite her CKD recently), HTN, IBS, osteoporosis int he past but no longer on medications and calcium supplements due to epistaxis?, HLD, GERD, hx of syncope and fall 1 year ago felt likely 2/2 orthostatic hypotension who now presents with syncope and fall with bleeding from ear and jaw pain and right sided rib pain. Pt reports that yesterday she had gotten up to use the bathroom as she was having an IBS flare over the last 1 day with about 2-3 episodes of diarrhea that day (loose but not watery stools). After having some diarrhea she stood up to leave the bathroom and began to feel lightheaded and dizzy without vertigo. She also felt jittery, which she reports was a similar sensation to when she has hypoglycemia with her DM2. She then passed out and woke up to find herself on the floor bleeding from the ear, with left sided jaw pain.  Pt also with right sided rib pain, no other painful areas.  Pt unsure how long she was out for. No chest pain, no palpitations, no SOB.  No known seizure like activity, no incontinence. No bleeding from tongue though family thinks she may have bruised tongue.    Of important note, patient reports that despite her diabetes her A1C is in the low 6 range, and had previously been in the 5's. She also reports frequent episodes of hypoglycemia, about 2 times per week that she measures with FS in the 70s. She also reports that prior to previous syncopal episode she does not believe she had a prodrome of lightheadedness however prior admission record from [place] indicate that she did have prodromal symptoms, so unclear if pt's memory is accurate of prior episode. Pt was also recently started on amlodipine a little over one month ago, and patient also had at similar timeframe onset of bilateral LE edema (mild).

## 2017-11-18 NOTE — H&P ADULT - NSHPREVIEWOFSYSTEMS_GEN_ALL_CORE
REVIEW OF SYSTEMS:  CONSTITUTIONAL: No weakness, fevers or chills  EYES: No visual changes  ENT: No dysphagia, bleeding from left ear as in HPI  NECK: No stiffness  RESPIRATORY: No cough, wheezing, hemoptysis; No shortness of breath  CARDIOVASCULAR: No chest pain or palpitations; see HPI   GASTROINTESTINAL: Diarrhea similar to baseline IBS-mild crampiness, No nausea or vomiting. No constipation. No melena or hematochezia.  GENITOURINARY: No dysuria, frequency or hematuria  NEUROLOGICAL: No numbness or weakness  SKIN: No itching, burning, rashes, or lesions   ALLERGIES: No drug reactions

## 2017-11-18 NOTE — ED PROVIDER NOTE - ATTENDING CONTRIBUTION TO CARE
81 yo female, syncope today in the bathroom, fell from standing, seen at [facility]SH, trauma workup with R 6th rib fx and L mandibular fx extending to auditory canal.  Ongoing small active bleed from L ear, otherwise no focal deficit.  Will consult trauma, OMFS, admit for syncope work-up with trauma/OMFS following.

## 2017-11-18 NOTE — H&P ADULT - PROBLEM SELECTOR PLAN 6
Change of ACE to daily regimen as above  DC of amlodipine.  Should stay well hydrated when having diarrhea.

## 2017-11-18 NOTE — ED PROVIDER NOTE - PHYSICAL EXAMINATION
Gen: NAD, AOx3  Head: NC, abrasion under chin no active bleeding, +bleeding left ear canal no laceration, +ttp left mandible  HEENT: PERRL, oral mucosa moist, normal conjunctiva, neck supple  Lung: CTAB, no respiratory distress  CV: rrr, no murmur, Normal perfusion  Abd: soft, NTND  MSK: No edema, no visible deformities  Neuro: No focal neurologic deficits  Skin: No rash   Psych: normal affect

## 2017-11-18 NOTE — ED PROVIDER NOTE - NS ED ROS FT
ROS: +CP no SOB. no cough. no fever. no n/v/d/c. no abd pain. no rash. no bleeding. no urinary complaints. no weakness. no vision changes. no HA. no neck/back pain. no extremity swelling/deformity. +change in mental status.

## 2017-11-18 NOTE — CONSULT NOTE ADULT - SUBJECTIVE AND OBJECTIVE BOX
TRAUMA SERVICE (Acute Care Surgery / [facility] - #9039) - CONSULT NOTE  --------------------------------------------------------------------------------------------    TRAUMA ACTIVATION LEVEL: Consult    MECHANISM OF INJURY:      [x] Blunt  	[] MVC	[x] Fall	[] Pedestrian Struck	[] Motorcycle accident      [] Penetrating  	[] Gun Shot Wound 		[] Stab Wound    GCS: 	E: 4	V: 5	M: 6    Patient is a 82y old  Female who presents with a chief complaint of syncope/fall/mandible fracture/skull fracture/rib fracture (18 Nov 2017 03:29)      HPI:  83yo F presents as trauma consult s/p syncopal fall.       Primary Survey:    A - airway intact  B - bilateral breath sounds and good chest rise  C - initial BP  BP: 161/65 (11-18-17 @ 03:50) , HR HR: 73 (11-18-17 @ 03:50) , palpable pulses in all extremities  D - GCS 15 on arrival  Exposure obtained    Patient denies fevers/chills, denies lightheadedness/dizziness, denies SOB/chest pain, denies nausea/vomiting, denies constipation/diarrhea.     ROS: 10-system review is otherwise negative except HPI above.      PAST MEDICAL & SURGICAL HISTORY:  GERD (Gastroesophageal Reflux Disease)  Diabetes Mellitus Type II  Hyperlipemia  Hypertension  S/P cataract surgery: left eye  Surgery, Elective: left arm surgery as child x 2    FAMILY HISTORY:    [] Family history not pertinent as reviewed with the patient and family    SOCIAL HISTORY:  ***    ALLERGIES: No Known Allergies      HOME MEDICATIONS: ***    CURRENT MEDICATIONS  MEDICATIONS (STANDING): dextrose 5% + sodium chloride 0.9%. 1000 milliLiter(s) IV Continuous <Continuous>    MEDICATIONS (PRN):  --------------------------------------------------------------------------------------------    Vitals:   T(C): 36.8 (11-18-17 @ 03:50), Max: 37.2 (11-18-17 @ 01:35)  HR: 73 (11-18-17 @ 03:50) (71 - 93)  BP: 161/65 (11-18-17 @ 03:50) (153/80 - 199/83)  BP(mean): --  RR: 16 (11-18-17 @ 03:50) (14 - 18)  SpO2: 96% (11-18-17 @ 03:50) (96% - 99%)  Wt(kg): --  CAPILLARY BLOOD GLUCOSE      POCT Blood Glucose.: 164 mg/dL (17 Nov 2017 19:57)    CAPILLARY BLOOD GLUCOSE      POCT Blood Glucose.: 164 mg/dL (17 Nov 2017 19:57)      Height (cm): 149.86 (11-17 @ 19:52)  Weight (kg): 59 (11-17 @ 19:52)  BMI (kg/m2): 26.3 (11-17 @ 19:52)  BSA (m2): 1.54 (11-17 @ 19:52)    PHYSICAL EXAM: ***  General: NAD  HEENT: Normocephalic, atraumatic, EOMI, PEERLA.  Neck: Soft, midline trachea.  Chest: No chest wall tenderness.   Cardiac: S1, S2, RRR  Respiratory: Bilateral breath sounds, clear and equal bilaterally  Abdomen: Soft, non-distended, non-tender TTP periumbilically, no rebound, +guarding  Groin: Normal appearing  Ext: palp radial b/l UE, b/l DP palp in Lower Extrem.   Back: no TTP, no palpable runoff/stepoff/deformity  Rectal: No genny blood, NESTOR with good tone    --------------------------------------------------------------------------------------------    LABS  CBC (11-17 @ 20:52)                              10.2<L>                         10.7<H>  )----------------(  339        81.7<H>% Neutrophils, 9.0<L>% Lymphocytes, ANC: 8.7<H>                              31.6<L>    BMP (11-17 @ 20:52)             138     |  103     |  30<H> 		Ca++ --      Ca 9.4                ---------------------------------( 138<H>		Mg --                 4.6     |  27      |  1.90<H>			Ph --        LFTs (11-17 @ 20:52)      TPro 6.6 / Alb 3.2<L> / TBili 0.3 / DBili -- / AST 18 / ALT 23 / AlkPhos 52    Coags (11-17 @ 22:38)  aPTT 28.8 / INR 1.07 / PT 11.7    Cardiac Markers (11-18 @ 03:36)     Trop: <0.01 -- / CKMB: -- / CK: --  Cardiac Markers (11-17 @ 20:52)     Trop: -- <.015 / CKMB: -- / CK: 156        IMAGING  ***    --------------------------------------------------------------------------------------------    ASSESSMENT: Patient is a 82y old f with ***    PLAN:  ***  -   -   -   -   - Patient seen/examined with attending.  - Plan to be discussed with Attending,  TRAUMA SERVICE (Acute Care Surgery / [facility] - #9039) - CONSULT NOTE  --------------------------------------------------------------------------------------------    TRAUMA ACTIVATION LEVEL: Consult    MECHANISM OF INJURY:      [x] Blunt  	[] MVC	[x] Fall	[] Pedestrian Struck	[] Motorcycle accident      [] Penetrating  	[] Gun Shot Wound 		[] Stab Wound    GCS: 	E: 4	V: 5	M: 6    Patient is a 82y old  Female who presents with a chief complaint of syncope/fall/mandible fracture/skull fracture/rib fracture (18 Nov 2017 03:29)      HPI:  81yo F presents as trauma consult s/p syncopal fall. The pt was getting up from the toilet and about to walk, when she felt light-headed, then fainted. Upon waking, she found her left ear was bleeding. She also noted some pain in the right chest, but she was most concerned about the bleeding from the ear. She initially presented to [facility] and was transferred to [facility] after a CT head showed a mandibular fracture. She denies fevers/chills/nausea/vomiting.       Primary Survey:    A - airway intact  B - bilateral breath sounds and good chest rise  C - initial BP  BP: 161/65 (11-18-17 @ 03:50) , HR HR: 73 (11-18-17 @ 03:50) , palpable pulses in all extremities  D - GCS 15 on arrival  Exposure obtained    Patient denies fevers/chills, denies lightheadedness/dizziness, denies SOB/chest pain, denies nausea/vomiting, denies constipation/diarrhea.     ROS: 10-system review is otherwise negative except HPI above.      PAST MEDICAL & SURGICAL HISTORY:  GERD (Gastroesophageal Reflux Disease)  Diabetes Mellitus Type II  Hyperlipemia  Hypertension  S/P cataract surgery: left eye  Surgery, Elective: left arm surgery as child x 2    FAMILY HISTORY:    [x] Family history not pertinent as reviewed with the patient and family    SOCIAL HISTORY:    Pt lives independently and ambulates without assistance.     ALLERGIES: No Known Allergies      HOME MEDICATIONS:  Vitamin D  Magnesium  Metformin  Glimepiride  Trazodone  amlodipine   Omeprazole      CURRENT MEDICATIONS  MEDICATIONS (STANDING): dextrose 5% + sodium chloride 0.9%. 1000 milliLiter(s) IV Continuous <Continuous>    MEDICATIONS (PRN):  --------------------------------------------------------------------------------------------    Vitals:   T(C): 36.8 (11-18-17 @ 03:50), Max: 37.2 (11-18-17 @ 01:35)  HR: 73 (11-18-17 @ 03:50) (71 - 93)  BP: 161/65 (11-18-17 @ 03:50) (153/80 - 199/83)  BP(mean): --  RR: 16 (11-18-17 @ 03:50) (14 - 18)  SpO2: 96% (11-18-17 @ 03:50) (96% - 99%)  Wt(kg): --  CAPILLARY BLOOD GLUCOSE      POCT Blood Glucose.: 164 mg/dL (17 Nov 2017 19:57)    CAPILLARY BLOOD GLUCOSE      POCT Blood Glucose.: 164 mg/dL (17 Nov 2017 19:57)      Height (cm): 149.86 (11-17 @ 19:52)  Weight (kg): 59 (11-17 @ 19:52)  BMI (kg/m2): 26.3 (11-17 @ 19:52)  BSA (m2): 1.54 (11-17 @ 19:52)    PHYSICAL EXAM:   General: NAD  HEENT: Normocephalic,  EOMI, PEERLA. small skin tear to submental region, bleeding from L ear, nontender facial bones  Neck: Soft, midline trachea.  Chest: Tender over Right chest, Took 1000 on IS   Cardiac: S1, S2, RRR  Respiratory: Bilateral breath sounds, clear and equal bilaterally  Abdomen: Soft, non-distended, non-tender TTP periumbilically, no rebound, +guarding  Groin: Normal appearing  Ext: palp radial b/l UE, b/l DP palp in Lower Extrem.   Back: no TTP, no palpable runoff/stepoff/deformity  Rectal: No genny blood, NESTOR with good tone    --------------------------------------------------------------------------------------------    LABS  CBC (11-17 @ 20:52)                              10.2<L>                         10.7<H>  )----------------(  339        81.7<H>% Neutrophils, 9.0<L>% Lymphocytes, ANC: 8.7<H>                              31.6<L>    BMP (11-17 @ 20:52)             138     |  103     |  30<H> 		Ca++ --      Ca 9.4                ---------------------------------( 138<H>		Mg --                 4.6     |  27      |  1.90<H>			Ph --        LFTs (11-17 @ 20:52)      TPro 6.6 / Alb 3.2<L> / TBili 0.3 / DBili -- / AST 18 / ALT 23 / AlkPhos 52    Coags (11-17 @ 22:38)  aPTT 28.8 / INR 1.07 / PT 11.7    Cardiac Markers (11-18 @ 03:36)     Trop: <0.01 -- / CKMB: -- / CK: --  Cardiac Markers (11-17 @ 20:52)     Trop: -- <.015 / CKMB: -- / CK: 156        IMAGING  < from: CT Head No Cont (11.17.17 @ 21:05) >  IMPRESSION:     CT head: No acute intracranial hemorrhage. Acute fracture involving the   posterior inferior wall of the left mandibular fossa/anterior wall of the   external auditory canal with associated blood products within the   external auditory canal. Additional mildly comminuted relatively   nondisplaced fracture of the mandibular head. No temporomandibular joint   dislocation.    CT cervical spine: No acute cervical spinal fracture or evidence of   traumatic malalignment.    < end of copied text >      < from: CT Chest No Cont (11.17.17 @ 21:08) >  IMPRESSION:    Acute mildly displaced right lateral sixth rib fracture. No pneumothorax    < end of copied text >

## 2017-11-18 NOTE — H&P ADULT - PMH
Chronic kidney disease, unspecified CKD stage    Diabetes Mellitus Type II    GERD (Gastroesophageal Reflux Disease)    Hyperlipemia    Hypertension    Irritable bowel syndrome with diarrhea    Orthostatic hypotension

## 2017-11-18 NOTE — H&P ADULT - PROBLEM SELECTOR PROBLEM 4
What Is The Reason For Today's Visit?: Excessive sun exposure Open fracture of left side of mandible, initial encounter

## 2017-11-18 NOTE — H&P ADULT - ASSESSMENT
81yo female w/ PMhx of DM2, CKD (pt unsure of baseline Cr but reports was high enough that she had to be taken off metformin previously and that in last year it seemed to have been slightly worse-pt reports being placed on ER metformin at a low dose despite her CKD recently), HTN, IBS, osteoporosis int he past but no longer on medications and calcium supplements due to epistaxis?, HLD, GERD, hx of syncope and fall 1 year ago felt likely 2/2 orthostatic hypotension who now presents with syncope and fall found to have multiple fractures

## 2017-11-18 NOTE — CONSULT NOTE ADULT - ASSESSMENT
82y old Female with bleeding from the left ear, on CT scan pt has left mandibular head fracture in which OMFS is treating, along with mandibular fossa/anterior wall of external auditory canal with associated blood products in external auditory canal.

## 2017-11-18 NOTE — H&P ADULT - NSHPLABSRESULTS_GEN_ALL_CORE
Labs personally reviewed:                        10.2   10.7  )-----------( 339      ( 17 Nov 2017 20:52 )             31.6       11-17    138  |  103  |  30<H>  ----------------------------<  138<H>  4.6   |  27  |  1.90<H>    Ca    9.4      17 Nov 2017 20:52    TPro  6.6  /  Alb  3.2<L>  /  TBili  0.3  /  DBili  x   /  AST  18  /  ALT  23  /  AlkPhos  52  11-17      CARDIAC MARKERS ( 18 Nov 2017 03:36 )  x     / <0.01 ng/mL / x     / x     / x      CARDIAC MARKERS ( 17 Nov 2017 20:52 )  <.015 ng/mL / x     / 156 U/L / x     / 2.3 ng/mL        LIVER FUNCTIONS - ( 17 Nov 2017 20:52 )  Alb: 3.2 g/dL / Pro: 6.6 g/dL / ALK PHOS: 52 U/L / ALT: 23 U/L / AST: 18 U/L / GGT: x             PT/INR - ( 17 Nov 2017 22:38 )   PT: 11.7 sec;   INR: 1.07 ratio         PTT - ( 17 Nov 2017 22:38 )  PTT:28.8 sec    Imaging personally reviewed  CT head without acute brain hemorrhage  Has acute  fracture  involving  the posterior  inferior  wall  of  the  left  mandibular  fossa/anterior  wall  of  the external  auditory  canal  with  associated  blood  products  within  the  external auditory  canal.  Additional  mildly  comminuted  relatively  nondisplaced fracture  of  the  mandibular  head.  CT c-spine without acute fracture    EKG personally reviewed-RBBB NSR TWI V2, no acutely concerning changes vs 7/8/2016 EKG

## 2017-11-18 NOTE — CONSULT NOTE ADULT - ASSESSMENT
82F with [id number] with left mandibular condyle fracture.  >> Due to the limited nature of the imaging study, recommend dedicated CT maxillofacial scan.    >> If fracture pattern is limited to the non-displaced left condylar head, recommend no acute surgical intervention; Mechanical soft diet; Continuous ice packs or cold compresses to the affected area, 20 minutes on, 20 minutes off; Pain control; Head of bed elevation. The patient will also be instructed to follow up with Dr. Crabtree within 1 week of discharge from the hospital. 82F with suspected left mandibular condyle fracture.  >> Due to the limited nature of the imaging study, recommend dedicated CT maxillofacial scan.    ADDENDUM:  82F with non-displaced left mandibular condylar head fracture.  >> No indication for acute surgical intervention.   >> Mechanical soft diet  >> Continuous ice packs or cold compresses to the affected area, 20 minutes on, 20 minutes off  >> Pain control  >> Head of bed elevation  >> Follow up with Dr. Adamaris Crabtree within x1 week of discharge from the hospital

## 2017-11-18 NOTE — CONSULT NOTE ADULT - SUBJECTIVE AND OBJECTIVE BOX
CC: bleeding from left ear s/p fall with mandibular fx    HPI: Patient is a 82y old  Female who presents with a chief complaint of syncopal episode s/p fall today. We are asked to evaluate the patient for bleeding from the left ear, on CT scan pt has left mandibular head fracture in which OMFS is treating, along with mandibular fossa/anterior wall of external auditory canal with associated blood products in external auditory canal.     PAST MEDICAL & SURGICAL HISTORY:  GERD (Gastroesophageal Reflux Disease)  Diabetes Mellitus Type II  Hyperlipemia  Hypertension  S/P cataract surgery: left eye  Surgery, Elective: left arm surgery as child x 2    Allergies    No Known Allergies    Intolerances      MEDICATIONS  (STANDING):  dextrose 5% + sodium chloride 0.9%. 1000 milliLiter(s) (100 mL/Hr) IV Continuous <Continuous>    MEDICATIONS  (PRN):      Social History: denies any tobacoo, etoh.     ROS: ENT, GI, , CV, Pulm, Neuro, Psych, MS, Heme, Endo, Constitutional; all negative except as noted in HPI    Vital Signs Last 24 Hrs  T(C): 37.2 (18 Nov 2017 01:35), Max: 37.2 (18 Nov 2017 01:35)  T(F): 98.9 (18 Nov 2017 01:35), Max: 98.9 (18 Nov 2017 01:35)  HR: 74 (18 Nov 2017 01:35) (71 - 93)  BP: 153/80 (18 Nov 2017 01:35) (153/80 - 199/83)  BP(mean): --  RR: 17 (18 Nov 2017 01:35) (14 - 18)  SpO2: 96% (18 Nov 2017 01:35) (96% - 99%)                          10.2   10.7  )-----------( 339      ( 17 Nov 2017 20:52 )             31.6    11-17    138  |  103  |  30<H>  ----------------------------<  138<H>  4.6   |  27  |  1.90<H>    Ca    9.4      17 Nov 2017 20:52    TPro  6.6  /  Alb  3.2<L>  /  TBili  0.3  /  DBili  x   /  AST  18  /  ALT  23  /  AlkPhos  52  11-17   PT/INR - ( 17 Nov 2017 22:38 )   PT: 11.7 sec;   INR: 1.07 ratio         PTT - ( 17 Nov 2017 22:38 )  PTT:28.8 sec  < from: CT Head No Cont (11.17.17 @ 21:05) >  IMPRESSION:     CT head: No acute intracranial hemorrhage. Acute fracture involving the   posterior inferior wall of the left mandibular fossa/anterior wall of the   external auditory canal with associated blood products within the   external auditory canal. Additional mildly comminuted relatively   nondisplaced fracture of the mandibular head. No temporomandibular joint   dislocation.    < end of copied text >      PHYSICAL EXAM:  Gen: NAD, well-developed  Head: Normocephalic, Atraumatic  Face: no edema/erythema/fluctuance, parotid glands soft without mass  Eyes: PERRL, EOMI, no scleral injection  Ears: Right - ear canal clear, TM intact without effusion / erythema.  No evidence of any fluid drainage.  No Mastoid tenderness / erythema/ ear bulging            Left - ear canal clear, TM intact without effusion / erythema.  No evidence of any fluid drainage.  No Mastoid tenderness / erythema/ ear bulging  Nose: Nares bilaterally patent, no discharge  Mouth: No Stridor / Drooling / Trismus.  Mucosa moist, tongue/uvula midline, oropharynx clear  Neck: Flat, supple, no lymphadenopathy, trachea midline, no masses  Resp: breathing easily, no stridor  CV: no peripheral edema/cyanosis    Fiberoptic Indirect laryngoscopy:  (With Scope #2) CC: bleeding from left ear s/p fall with mandibular fx    HPI: Patient is a 82y old  Female who presents with a chief complaint of syncopal episode s/p fall today. We are asked to evaluate the patient for bleeding from the left ear, on CT scan pt has left mandibular head fracture in which OMFS is treating, along with mandibular fossa/anterior wall of external auditory canal with associated blood products in external auditory canal. Pt c/o pressure similar to congestion when ear was actively bleeding, since it stopped pressure is less. pt denies any tinnitus, vertigo, sob, palpitations     PAST MEDICAL & SURGICAL HISTORY:  GERD (Gastroesophageal Reflux Disease)  Diabetes Mellitus Type II  Hyperlipemia  Hypertension  S/P cataract surgery: left eye  Surgery, Elective: left arm surgery as child x 2    Allergies    No Known Allergies    Intolerances      MEDICATIONS  (STANDING):  dextrose 5% + sodium chloride 0.9%. 1000 milliLiter(s) (100 mL/Hr) IV Continuous <Continuous>    MEDICATIONS  (PRN):      Social History: denies any tobacoo, etoh.     ROS: ENT left ear bleeding, GI, , CV, Pulm, Neuro, Psych, MS, Heme, Endo, Constitutional; all negative except as noted in HPI    Vital Signs Last 24 Hrs  T(C): 37.2 (18 Nov 2017 01:35), Max: 37.2 (18 Nov 2017 01:35)  T(F): 98.9 (18 Nov 2017 01:35), Max: 98.9 (18 Nov 2017 01:35)  HR: 74 (18 Nov 2017 01:35) (71 - 93)  BP: 153/80 (18 Nov 2017 01:35) (153/80 - 199/83)  BP(mean): --  RR: 17 (18 Nov 2017 01:35) (14 - 18)  SpO2: 96% (18 Nov 2017 01:35) (96% - 99%)                          10.2   10.7  )-----------( 339      ( 17 Nov 2017 20:52 )             31.6    11-17    138  |  103  |  30<H>  ----------------------------<  138<H>  4.6   |  27  |  1.90<H>    Ca    9.4      17 Nov 2017 20:52    TPro  6.6  /  Alb  3.2<L>  /  TBili  0.3  /  DBili  x   /  AST  18  /  ALT  23  /  AlkPhos  52  11-17   PT/INR - ( 17 Nov 2017 22:38 )   PT: 11.7 sec;   INR: 1.07 ratio         PTT - ( 17 Nov 2017 22:38 )  PTT:28.8 sec  < from: CT Head No Cont (11.17.17 @ 21:05) >  IMPRESSION:     CT head: No acute intracranial hemorrhage. Acute fracture involving the   posterior inferior wall of the left mandibular fossa/anterior wall of the   external auditory canal with associated blood products within the   external auditory canal. Additional mildly comminuted relatively   nondisplaced fracture of the mandibular head. No temporomandibular joint   dislocation.    < end of copied text >      PHYSICAL EXAM:  Gen: NAD, well-developed  Head: Normocephalic, Atraumatic  Face: mandibular TTP, no edema/erythema/fluctuance, parotid glands soft without mass  Eyes:  no scleral injection  Ears: Right - ear canal clear, TM intact without effusion / erythema.  No evidence of any fluid drainage.  No Mastoid tenderness / erythema/ ear bulging            Left - + blood in left ear unable to visualize TM, gauze in place to prevent further oozing at this time.   Nose: Nares bilaterally patent, no discharge  Mouth: No Stridor / Drooling / Trismus.  Mucosa moist, tongue/uvula midline, oropharynx clear  Neck: Flat, supple, no lymphadenopathy, trachea midline, no masses  Resp: breathing easily, no stridor  CV: no peripheral edema/cyanosis

## 2017-11-18 NOTE — ED PROVIDER NOTE - CARE PLAN
Principal Discharge DX:	Syncope and collapse  Secondary Diagnosis:	Closed fracture of one rib of right side, initial encounter  Secondary Diagnosis:	Mandibular fracture, open

## 2017-11-19 LAB
ANION GAP SERPL CALC-SCNC: 12 MMOL/L — SIGNIFICANT CHANGE UP (ref 5–17)
BUN SERPL-MCNC: 21 MG/DL — SIGNIFICANT CHANGE UP (ref 7–23)
CALCIUM SERPL-MCNC: 9.5 MG/DL — SIGNIFICANT CHANGE UP (ref 8.4–10.5)
CHLORIDE SERPL-SCNC: 101 MMOL/L — SIGNIFICANT CHANGE UP (ref 96–108)
CO2 SERPL-SCNC: 27 MMOL/L — SIGNIFICANT CHANGE UP (ref 22–31)
CREAT SERPL-MCNC: 1.74 MG/DL — HIGH (ref 0.5–1.3)
GLUCOSE BLDC GLUCOMTR-MCNC: 117 MG/DL — HIGH (ref 70–99)
GLUCOSE BLDC GLUCOMTR-MCNC: 129 MG/DL — HIGH (ref 70–99)
GLUCOSE BLDC GLUCOMTR-MCNC: 135 MG/DL — HIGH (ref 70–99)
GLUCOSE BLDC GLUCOMTR-MCNC: 142 MG/DL — HIGH (ref 70–99)
GLUCOSE SERPL-MCNC: 103 MG/DL — HIGH (ref 70–99)
HCT VFR BLD CALC: 30.6 % — LOW (ref 34.5–45)
HGB BLD-MCNC: 10.2 G/DL — LOW (ref 11.5–15.5)
MCHC RBC-ENTMCNC: 30.9 PG — SIGNIFICANT CHANGE UP (ref 27–34)
MCHC RBC-ENTMCNC: 33.4 GM/DL — SIGNIFICANT CHANGE UP (ref 32–36)
MCV RBC AUTO: 92.5 FL — SIGNIFICANT CHANGE UP (ref 80–100)
PLATELET # BLD AUTO: 324 K/UL — SIGNIFICANT CHANGE UP (ref 150–400)
POTASSIUM SERPL-MCNC: 4.5 MMOL/L — SIGNIFICANT CHANGE UP (ref 3.5–5.3)
POTASSIUM SERPL-SCNC: 4.5 MMOL/L — SIGNIFICANT CHANGE UP (ref 3.5–5.3)
RBC # BLD: 3.3 M/UL — LOW (ref 3.8–5.2)
RBC # FLD: 13 % — SIGNIFICANT CHANGE UP (ref 10.3–14.5)
SODIUM SERPL-SCNC: 140 MMOL/L — SIGNIFICANT CHANGE UP (ref 135–145)
WBC # BLD: 6.6 K/UL — SIGNIFICANT CHANGE UP (ref 3.8–10.5)
WBC # FLD AUTO: 6.6 K/UL — SIGNIFICANT CHANGE UP (ref 3.8–10.5)

## 2017-11-19 RX ORDER — SENNA PLUS 8.6 MG/1
2 TABLET ORAL AT BEDTIME
Qty: 0 | Refills: 0 | Status: DISCONTINUED | OUTPATIENT
Start: 2017-11-19 | End: 2017-11-21

## 2017-11-19 RX ADMIN — SENNA PLUS 2 TABLET(S): 8.6 TABLET ORAL at 21:15

## 2017-11-19 RX ADMIN — PANTOPRAZOLE SODIUM 40 MILLIGRAM(S): 20 TABLET, DELAYED RELEASE ORAL at 05:31

## 2017-11-19 RX ADMIN — PREGABALIN 100 MICROGRAM(S): 225 CAPSULE ORAL at 11:34

## 2017-11-19 RX ADMIN — SODIUM CHLORIDE 50 MILLILITER(S): 9 INJECTION, SOLUTION INTRAVENOUS at 02:30

## 2017-11-19 RX ADMIN — Medication 1000 UNIT(S): at 11:35

## 2017-11-19 RX ADMIN — OXYCODONE AND ACETAMINOPHEN 1 TABLET(S): 5; 325 TABLET ORAL at 06:08

## 2017-11-19 RX ADMIN — LISINOPRIL 5 MILLIGRAM(S): 2.5 TABLET ORAL at 05:31

## 2017-11-19 RX ADMIN — Medication 325 MILLIGRAM(S): at 11:34

## 2017-11-19 RX ADMIN — Medication 75 MILLIGRAM(S): at 21:15

## 2017-11-19 RX ADMIN — Medication 1 TABLET(S): at 11:35

## 2017-11-19 RX ADMIN — OXYCODONE AND ACETAMINOPHEN 1 TABLET(S): 5; 325 TABLET ORAL at 06:32

## 2017-11-19 NOTE — CONSULT NOTE ADULT - ASSESSMENT
83yo female w/ PMhx of DM2, CKD  HTN, IBS, osteoporosis, HLD, GERD, presenting w traumatic syncope    -Event likely related to orthostatic hypotension in the setting of hypovolemia and LVH which probably led to possible outflow tract obstruction.  -Check orthostatics, if + give IVF and reevaluate  -Check ECHO  -No events on tele  -ENT follow up for GARRISON marinelli

## 2017-11-19 NOTE — PROGRESS NOTE ADULT - SUBJECTIVE AND OBJECTIVE BOX
Patient is a 82y old  Female who presents with a chief complaint of syncope/collapse (18 Nov 2017 05:50)      SUBJECTIVE / OVERNIGHT EVENTS:    Patient seen and examined.       Vital Signs Last 24 Hrs  T(C): 36.7 (19 Nov 2017 04:15), Max: 36.9 (18 Nov 2017 21:22)  T(F): 98 (19 Nov 2017 04:15), Max: 98.5 (18 Nov 2017 21:22)  HR: 67 (18 Nov 2017 22:00) (64 - 67)  BP: 167/78 (18 Nov 2017 22:00) (164/74 - 171/73)  BP(mean): --  RR: 18 (19 Nov 2017 04:15) (18 - 18)  SpO2: 95% (19 Nov 2017 04:15) (95% - 95%)  I&O's Summary    18 Nov 2017 07:01  -  19 Nov 2017 07:00  --------------------------------------------------------  IN: 1720 mL / OUT: 600 mL / NET: 1120 mL        PE:  GENERAL: NAD, AAOx3  HEAD:  Atraumatic, Normocephalic  EYES: EOMI, PERRLA, conjunctiva and sclera clear  NECK: Supple, No JVD  CHEST/LUNG: CTABL, No wheeze  HEART: Regular rate and rhythm; + murmur  ABDOMEN: Soft, Nontender, Nondistended; Bowel sounds present  EXTREMITIES:  2+ Peripheral Pulses, No clubbing, cyanosis, or edema  SKIN: No rashes or lesions  NEURO: No focal deficits    LABS:                        10.2   6.6   )-----------( 324      ( 19 Nov 2017 07:00 )             30.6     11-19    140  |  101  |  21  ----------------------------<  103<H>  4.5   |  27  |  1.74<H>    Ca    9.5      19 Nov 2017 07:00    TPro  6.6  /  Alb  3.2<L>  /  TBili  0.3  /  DBili  x   /  AST  18  /  ALT  23  /  AlkPhos  52  11-17    PT/INR - ( 17 Nov 2017 22:38 )   PT: 11.7 sec;   INR: 1.07 ratio         PTT - ( 17 Nov 2017 22:38 )  PTT:28.8 sec  CAPILLARY BLOOD GLUCOSE      POCT Blood Glucose.: 117 mg/dL (19 Nov 2017 08:35)  POCT Blood Glucose.: 122 mg/dL (18 Nov 2017 20:58)  POCT Blood Glucose.: 102 mg/dL (18 Nov 2017 18:19)  POCT Blood Glucose.: 124 mg/dL (18 Nov 2017 13:05)    CARDIAC MARKERS ( 18 Nov 2017 03:36 )  x     / <0.01 ng/mL / x     / x     / x      CARDIAC MARKERS ( 17 Nov 2017 20:52 )  <.015 ng/mL / x     / 156 U/L / x     / 2.3 ng/mL          RADIOLOGY & ADDITIONAL TESTS:    Imaging Personally Reviewed:  [x] YES  [ ] NO    Consultant(s) Notes Reviewed:  [x] YES  [ ] NO    MEDICATIONS  (STANDING):  calcium carbonate  625 mG + Vitamin D (OsCal 250 + D) 1 Tablet(s) Oral daily  cholecalciferol 1000 Unit(s) Oral daily  cyanocobalamin 100 MICROGram(s) Oral daily  dextrose 5%. 1000 milliLiter(s) (50 mL/Hr) IV Continuous <Continuous>  dextrose 50% Injectable 12.5 Gram(s) IV Push once  dextrose 50% Injectable 25 Gram(s) IV Push once  dextrose 50% Injectable 25 Gram(s) IV Push once  ferrous    sulfate 325 milliGRAM(s) Oral daily  insulin lispro (HumaLOG) corrective regimen sliding scale   SubCutaneous three times a day before meals  insulin lispro (HumaLOG) corrective regimen sliding scale   SubCutaneous at bedtime  lisinopril 5 milliGRAM(s) Oral daily  pantoprazole    Tablet 40 milliGRAM(s) Oral before breakfast  sodium chloride 0.45%. 1000 milliLiter(s) (50 mL/Hr) IV Continuous <Continuous>  traZODone 75 milliGRAM(s) Oral at bedtime    MEDICATIONS  (PRN):  acetaminophen   Tablet. 650 milliGRAM(s) Oral every 6 hours PRN Mild Pain (1 - 3)  dextrose Gel 1 Dose(s) Oral once PRN Blood Glucose LESS THAN 70 milliGRAM(s)/deciliter  glucagon  Injectable 1 milliGRAM(s) IntraMuscular once PRN Glucose LESS THAN 70 milligrams/deciliter  morphine  - Injectable 2 milliGRAM(s) IV Push every 4 hours PRN Severe Pain (7 - 10)  oxyCODONE    5 mG/acetaminophen 325 mG 1 Tablet(s) Oral every 4 hours PRN Moderate Pain (4 - 6)      Care Discussed with Consultants/Other Providers [x] YES  [ ] NO    HEALTH ISSUES - PROBLEM Dx:  Chronic kidney disease, unspecified CKD stage: Chronic kidney disease, unspecified CKD stage  Essential hypertension: Essential hypertension  Irritable bowel syndrome with diarrhea: Irritable bowel syndrome with diarrhea  Open fracture of left side of mandible, initial encounter: Open fracture of left side of mandible, initial encounter  Polypharmacy: Polypharmacy  Hypoglycemia: Hypoglycemia  Syncope and collapse: Syncope and collapse  Bleeding from left ear: Bleeding from left ear Patient is a 82y old  Female who presents with a chief complaint of syncope/collapse (18 Nov 2017 05:50)      SUBJECTIVE / OVERNIGHT EVENTS:    Patient seen and examined. co pain with inspiration. pain controlled with pain meds. no bm since admission.      Vital Signs Last 24 Hrs  T(C): 36.7 (19 Nov 2017 04:15), Max: 36.9 (18 Nov 2017 21:22)  T(F): 98 (19 Nov 2017 04:15), Max: 98.5 (18 Nov 2017 21:22)  HR: 67 (18 Nov 2017 22:00) (64 - 67)  BP: 167/78 (18 Nov 2017 22:00) (164/74 - 171/73)  BP(mean): --  RR: 18 (19 Nov 2017 04:15) (18 - 18)  SpO2: 95% (19 Nov 2017 04:15) (95% - 95%)  I&O's Summary    18 Nov 2017 07:01  -  19 Nov 2017 07:00  --------------------------------------------------------  IN: 1720 mL / OUT: 600 mL / NET: 1120 mL        PE:  GENERAL: NAD, AAOx3  HEAD:  Atraumatic, Normocephalic, echymosis under chip, left ear with dried blood no drainage  EYES: EOMI, PERRLA, conjunctiva and sclera clear  NECK: Supple, No JVD  CHEST/LUNG: CTABL, No wheeze  HEART: Regular rate and rhythm; + murmur  ABDOMEN: Soft, Nontender, Nondistended; Bowel sounds present  EXTREMITIES:  2+ Peripheral Pulses, No clubbing, cyanosis, or edema  SKIN: No rashes or lesions  NEURO: No focal deficits    LABS:                        10.2   6.6   )-----------( 324      ( 19 Nov 2017 07:00 )             30.6     11-19    140  |  101  |  21  ----------------------------<  103<H>  4.5   |  27  |  1.74<H>    Ca    9.5      19 Nov 2017 07:00    TPro  6.6  /  Alb  3.2<L>  /  TBili  0.3  /  DBili  x   /  AST  18  /  ALT  23  /  AlkPhos  52  11-17    PT/INR - ( 17 Nov 2017 22:38 )   PT: 11.7 sec;   INR: 1.07 ratio         PTT - ( 17 Nov 2017 22:38 )  PTT:28.8 sec  CAPILLARY BLOOD GLUCOSE      POCT Blood Glucose.: 117 mg/dL (19 Nov 2017 08:35)  POCT Blood Glucose.: 122 mg/dL (18 Nov 2017 20:58)  POCT Blood Glucose.: 102 mg/dL (18 Nov 2017 18:19)  POCT Blood Glucose.: 124 mg/dL (18 Nov 2017 13:05)    CARDIAC MARKERS ( 18 Nov 2017 03:36 )  x     / <0.01 ng/mL / x     / x     / x      CARDIAC MARKERS ( 17 Nov 2017 20:52 )  <.015 ng/mL / x     / 156 U/L / x     / 2.3 ng/mL          RADIOLOGY & ADDITIONAL TESTS:    Imaging Personally Reviewed:  [x] YES  [ ] NO    Consultant(s) Notes Reviewed:  [x] YES  [ ] NO    MEDICATIONS  (STANDING):  calcium carbonate  625 mG + Vitamin D (OsCal 250 + D) 1 Tablet(s) Oral daily  cholecalciferol 1000 Unit(s) Oral daily  cyanocobalamin 100 MICROGram(s) Oral daily  dextrose 5%. 1000 milliLiter(s) (50 mL/Hr) IV Continuous <Continuous>  dextrose 50% Injectable 12.5 Gram(s) IV Push once  dextrose 50% Injectable 25 Gram(s) IV Push once  dextrose 50% Injectable 25 Gram(s) IV Push once  ferrous    sulfate 325 milliGRAM(s) Oral daily  insulin lispro (HumaLOG) corrective regimen sliding scale   SubCutaneous three times a day before meals  insulin lispro (HumaLOG) corrective regimen sliding scale   SubCutaneous at bedtime  lisinopril 5 milliGRAM(s) Oral daily  pantoprazole    Tablet 40 milliGRAM(s) Oral before breakfast  sodium chloride 0.45%. 1000 milliLiter(s) (50 mL/Hr) IV Continuous <Continuous>  traZODone 75 milliGRAM(s) Oral at bedtime    MEDICATIONS  (PRN):  acetaminophen   Tablet. 650 milliGRAM(s) Oral every 6 hours PRN Mild Pain (1 - 3)  dextrose Gel 1 Dose(s) Oral once PRN Blood Glucose LESS THAN 70 milliGRAM(s)/deciliter  glucagon  Injectable 1 milliGRAM(s) IntraMuscular once PRN Glucose LESS THAN 70 milligrams/deciliter  morphine  - Injectable 2 milliGRAM(s) IV Push every 4 hours PRN Severe Pain (7 - 10)  oxyCODONE    5 mG/acetaminophen 325 mG 1 Tablet(s) Oral every 4 hours PRN Moderate Pain (4 - 6)      Care Discussed with Consultants/Other Providers [x] YES  [ ] NO    HEALTH ISSUES - PROBLEM Dx:  Chronic kidney disease, unspecified CKD stage: Chronic kidney disease, unspecified CKD stage  Essential hypertension: Essential hypertension  Irritable bowel syndrome with diarrhea: Irritable bowel syndrome with diarrhea  Open fracture of left side of mandible, initial encounter: Open fracture of left side of mandible, initial encounter  Polypharmacy: Polypharmacy  Hypoglycemia: Hypoglycemia  Syncope and collapse: Syncope and collapse  Bleeding from left ear: Bleeding from left ear

## 2017-11-19 NOTE — CONSULT NOTE ADULT - SUBJECTIVE AND OBJECTIVE BOX
PH Cardio coverage    HPI:  83yo female w/ PMhx of DM2, CKD  HTN, IBS, osteoporosis, HLD, GERD, hx of syncope and fall 1 year ago felt likely 2/2 orthostatic hypotension who now presents with syncope and fall with bleeding from ear and jaw pain and right sided rib pain. Pt reports that she had gotten up to use the bathroom as she was having an IBS flare over the last 1 day with about 2-3 episodes of diarrhea that day (loose but not watery stools). After having some diarrhea she stood up to leave the bathroom and began to feel lightheaded and dizzy without vertigo. She also felt jittery, which she reports was a similar sensation to when she has hypoglycemia with her DM2. She then passed out and woke up to find herself on the floor bleeding from the ear, with left sided jaw pain.  Pt also with right sided rib pain, no other painful areas.  Pt unsure how long she was out for. No chest pain, no palpitations, no SOB.  No known seizure like activity, no incontinence. No bleeding from tongue though family thinks she may have bruised tongue.      PAST MEDICAL & SURGICAL HISTORY:  Chronic kidney disease, unspecified CKD stage  Orthostatic hypotension  Irritable bowel syndrome with diarrhea  GERD (Gastroesophageal Reflux Disease)  Diabetes Mellitus Type II  Hyperlipemia  Hypertension  S/P cataract surgery: left eye  Surgery, Elective: left arm surgery as child x 2          PREVIOUS DIAGNOSTIC TESTING:    [ ] Echocardiogram:  [ ]  Catheterization:  [ ] Stress Test:  	    MEDICATIONS:  MEDICATIONS  (STANDING):  calcium carbonate  625 mG + Vitamin D (OsCal 250 + D) 1 Tablet(s) Oral daily  cholecalciferol 1000 Unit(s) Oral daily  cyanocobalamin 100 MICROGram(s) Oral daily  dextrose 5%. 1000 milliLiter(s) (50 mL/Hr) IV Continuous <Continuous>  dextrose 50% Injectable 12.5 Gram(s) IV Push once  dextrose 50% Injectable 25 Gram(s) IV Push once  dextrose 50% Injectable 25 Gram(s) IV Push once  ferrous    sulfate 325 milliGRAM(s) Oral daily  insulin lispro (HumaLOG) corrective regimen sliding scale   SubCutaneous three times a day before meals  insulin lispro (HumaLOG) corrective regimen sliding scale   SubCutaneous at bedtime  lisinopril 5 milliGRAM(s) Oral daily  pantoprazole    Tablet 40 milliGRAM(s) Oral before breakfast  sodium chloride 0.45%. 1000 milliLiter(s) (50 mL/Hr) IV Continuous <Continuous>  traZODone 75 milliGRAM(s) Oral at bedtime      FAMILY HISTORY:  No pertinent family history in first degree relatives      SOCIAL HISTORY:    [ ] Non-smoker  [ ] Smoker  [ ] Alcohol    Allergies    No Known Allergies    Intolerances    	    REVIEW OF SYSTEMS:  CONSTITUTIONAL: No fever, weight loss, or fatigue  EYES: No eye pain, visual disturbances, or discharge  ENMT: + left ear pain  NECK: No pain or stiffness  RESPIRATORY: No cough, wheezing, chills or hemoptysis; No Shortness of Breath  CARDIOVASCULAR: No chest pain, palpitations, passing out, dizziness, or leg swelling  GASTROINTESTINAL: No abdominal or epigastric pain. No nausea, vomiting, or hematemesis; No diarrhea or constipation. No melena or hematochezia.  GENITOURINARY: No dysuria, frequency, hematuria, or incontinence  NEUROLOGICAL: No headaches, memory loss, loss of strength, numbness, or tremors  SKIN: No itching, burning, rashes, or lesions   	    [ ] All others negative	  [ ] Unable to obtain    PHYSICAL EXAM:  T(C): 36.7 (11-19-17 @ 04:15), Max: 36.9 (11-18-17 @ 21:22)  HR: 67 (11-18-17 @ 22:00) (64 - 67)  BP: 167/78 (11-18-17 @ 22:00) (164/74 - 171/73)  RR: 18 (11-19-17 @ 04:15) (18 - 18)  SpO2: 95% (11-19-17 @ 04:15) (95% - 95%)  Wt(kg): --  I&O's Summary    18 Nov 2017 07:01  -  19 Nov 2017 07:00  --------------------------------------------------------  IN: 1720 mL / OUT: 600 mL / NET: 1120 mL        Appearance: Normal	  Psychiatry: A & O x 3, Mood & affect appropriate  HEENT:   left ear packing	  Lymphatic: No lymphadenopathy  Cardiovascular: Normal S1 S2,RRR, No JVD, No murmurs  Respiratory: Lungs clear to auscultation	  Gastrointestinal:  Soft, Non-tender, + BS	  Skin: No rashes, No ecchymoses, No cyanosis	  Neurologic: Non-focal  Extremities: Normal range of motion, No clubbing, cyanosis or edema  Vascular: Peripheral pulses palpable 2+ bilaterally    TELEMETRY: 	 NSR   ECG:  	NSR, LVH, RBBB  RADIOLOGY:  OTHER: 	  	  LABS:	 	    CARDIAC MARKERS:                                  10.2   6.6   )-----------( 324      ( 19 Nov 2017 07:00 )             30.6     11-19    140  |  101  |  21  ----------------------------<  103<H>  4.5   |  27  |  1.74<H>    Ca    9.5      19 Nov 2017 07:00    TPro  6.6  /  Alb  3.2<L>  /  TBili  0.3  /  DBili  x   /  AST  18  /  ALT  23  /  AlkPhos  52  11-17    PT/INR - ( 17 Nov 2017 22:38 )   PT: 11.7 sec;   INR: 1.07 ratio         PTT - ( 17 Nov 2017 22:38 )  PTT:28.8 sec  proBNP:   Lipid Profile:   HgA1c:   TSH:     ASSESSMENT/PLAN:

## 2017-11-20 ENCOUNTER — TRANSCRIPTION ENCOUNTER (OUTPATIENT)
Age: 82
End: 2017-11-20

## 2017-11-20 LAB
ANION GAP SERPL CALC-SCNC: 16 MMOL/L — SIGNIFICANT CHANGE UP (ref 5–17)
BUN SERPL-MCNC: 22 MG/DL — SIGNIFICANT CHANGE UP (ref 7–23)
CALCIUM SERPL-MCNC: 10.3 MG/DL — SIGNIFICANT CHANGE UP (ref 8.4–10.5)
CHLORIDE SERPL-SCNC: 100 MMOL/L — SIGNIFICANT CHANGE UP (ref 96–108)
CO2 SERPL-SCNC: 25 MMOL/L — SIGNIFICANT CHANGE UP (ref 22–31)
CREAT SERPL-MCNC: 1.72 MG/DL — HIGH (ref 0.5–1.3)
GLUCOSE BLDC GLUCOMTR-MCNC: 125 MG/DL — HIGH (ref 70–99)
GLUCOSE BLDC GLUCOMTR-MCNC: 126 MG/DL — HIGH (ref 70–99)
GLUCOSE BLDC GLUCOMTR-MCNC: 131 MG/DL — HIGH (ref 70–99)
GLUCOSE BLDC GLUCOMTR-MCNC: 148 MG/DL — HIGH (ref 70–99)
GLUCOSE SERPL-MCNC: 154 MG/DL — HIGH (ref 70–99)
HCT VFR BLD CALC: 37.5 % — SIGNIFICANT CHANGE UP (ref 34.5–45)
HGB BLD-MCNC: 12.5 G/DL — SIGNIFICANT CHANGE UP (ref 11.5–15.5)
MCHC RBC-ENTMCNC: 30.2 PG — SIGNIFICANT CHANGE UP (ref 27–34)
MCHC RBC-ENTMCNC: 33.4 GM/DL — SIGNIFICANT CHANGE UP (ref 32–36)
MCV RBC AUTO: 90.6 FL — SIGNIFICANT CHANGE UP (ref 80–100)
PLATELET # BLD AUTO: 448 K/UL — HIGH (ref 150–400)
POTASSIUM SERPL-MCNC: 4.4 MMOL/L — SIGNIFICANT CHANGE UP (ref 3.5–5.3)
POTASSIUM SERPL-SCNC: 4.4 MMOL/L — SIGNIFICANT CHANGE UP (ref 3.5–5.3)
RBC # BLD: 4.14 M/UL — SIGNIFICANT CHANGE UP (ref 3.8–5.2)
RBC # FLD: 12.7 % — SIGNIFICANT CHANGE UP (ref 10.3–14.5)
SODIUM SERPL-SCNC: 141 MMOL/L — SIGNIFICANT CHANGE UP (ref 135–145)
WBC # BLD: 10.4 K/UL — SIGNIFICANT CHANGE UP (ref 3.8–10.5)
WBC # FLD AUTO: 10.4 K/UL — SIGNIFICANT CHANGE UP (ref 3.8–10.5)

## 2017-11-20 PROCEDURE — 93306 TTE W/DOPPLER COMPLETE: CPT | Mod: 26

## 2017-11-20 RX ORDER — LISINOPRIL 2.5 MG/1
1 TABLET ORAL
Qty: 0 | Refills: 0 | COMMUNITY
Start: 2017-11-20

## 2017-11-20 RX ORDER — CIPROFLOXACIN AND DEXAMETHASONE 3; 1 MG/ML; MG/ML
5 SUSPENSION/ DROPS AURICULAR (OTIC)
Qty: 0 | Refills: 0 | COMMUNITY
Start: 2017-11-20

## 2017-11-20 RX ORDER — LISINOPRIL 2.5 MG/1
20 TABLET ORAL DAILY
Qty: 0 | Refills: 0 | Status: DISCONTINUED | OUTPATIENT
Start: 2017-11-21 | End: 2017-11-21

## 2017-11-20 RX ORDER — CIPROFLOXACIN AND DEXAMETHASONE 3; 1 MG/ML; MG/ML
5 SUSPENSION/ DROPS AURICULAR (OTIC) EVERY 12 HOURS
Qty: 0 | Refills: 0 | Status: DISCONTINUED | OUTPATIENT
Start: 2017-11-21 | End: 2017-11-21

## 2017-11-20 RX ORDER — AMLODIPINE BESYLATE 2.5 MG/1
5 TABLET ORAL DAILY
Qty: 0 | Refills: 0 | Status: DISCONTINUED | OUTPATIENT
Start: 2017-11-20 | End: 2017-11-20

## 2017-11-20 RX ORDER — LISINOPRIL 2.5 MG/1
10 TABLET ORAL ONCE
Qty: 0 | Refills: 0 | Status: COMPLETED | OUTPATIENT
Start: 2017-11-20 | End: 2017-11-20

## 2017-11-20 RX ADMIN — LISINOPRIL 10 MILLIGRAM(S): 2.5 TABLET ORAL at 17:43

## 2017-11-20 RX ADMIN — PREGABALIN 100 MICROGRAM(S): 225 CAPSULE ORAL at 11:20

## 2017-11-20 RX ADMIN — PANTOPRAZOLE SODIUM 40 MILLIGRAM(S): 20 TABLET, DELAYED RELEASE ORAL at 05:49

## 2017-11-20 RX ADMIN — Medication 10 MILLIGRAM(S): at 13:08

## 2017-11-20 RX ADMIN — Medication 75 MILLIGRAM(S): at 21:04

## 2017-11-20 RX ADMIN — OXYCODONE AND ACETAMINOPHEN 1 TABLET(S): 5; 325 TABLET ORAL at 19:50

## 2017-11-20 RX ADMIN — Medication 1 TABLET(S): at 11:19

## 2017-11-20 RX ADMIN — OXYCODONE AND ACETAMINOPHEN 1 TABLET(S): 5; 325 TABLET ORAL at 20:30

## 2017-11-20 RX ADMIN — LISINOPRIL 5 MILLIGRAM(S): 2.5 TABLET ORAL at 05:49

## 2017-11-20 RX ADMIN — Medication 1000 UNIT(S): at 11:19

## 2017-11-20 NOTE — DISCHARGE NOTE ADULT - HOSPITAL COURSE
81yo F PMHx DM2, CKD, HTN, IBS, osteoporosis int he past but no longer on medications and calcium supplements due to epistaxis?, HLD, GERD, hx of syncope and fall 1 year ago felt likely 2/2 orthostatic hypotension who now presents with syncope and fall found to have multiple fractures 83yo F PMHx DM2, CKD, HTN, IBS, osteoporosis int he past but no longer on medications and calcium supplements due to epistaxis?, HLD, GERD, hx of syncope and fall 1 year ago felt likely 2/2 orthostatic hypotension who now presents with syncope and fall found to have multiple fractures        Attending DC note:  83yo F PMHx DM2, CKD, HTN, IBS, osteoporosis in the past but no longer on medications and calcium supplements due to epistaxis?, HLD, GERD, hx of syncope and fall 1 year ago felt likely 2/2 orthostatic hypotension who now presents with syncope and fall found to have multiple fractures including non-displaced left mandibular condylar head fracture, rib fracture and fracture of fossa/anterior external auditory canal with ear bleeding. pt evaluated by OMFS and ENT and no acute surgical indication. syncope most likely related to orthostatic hypotension. current orthostatics neg. TTE shows mild diastolic dysfunction, LVH. metformin discontinued for CKD. patient did not require SSI while in patient. to fu outpt regarding DM management. outpt follow up with Dr. Adamaris Crabtree within x1 week of discharge from the hospital.

## 2017-11-20 NOTE — DISCHARGE NOTE ADULT - CARE PROVIDER_API CALL
Aniceto Gann), Infectious Disease; Internal Medicine  [address] [place] [id number]  Phone: [phone]  Fax: [phone]    Angela Solorio), Hematology; Internal Medicine  [address] [address]  Phone: [phone]  Fax: [phone]    Dr. Abdoul  Phone: (   )    -  Fax: (   )    -

## 2017-11-20 NOTE — DISCHARGE NOTE ADULT - PROVIDER TOKENS
TOKEN:'[id number]:'[id number]',FREE:[LAST:[Abdoul],FIRST:[],PHONE:[(   )    -],FAX:[(   )    -]]

## 2017-11-20 NOTE — DISCHARGE NOTE ADULT - SECONDARY DIAGNOSIS.
Diabetes mellitus type II, controlled Chronic kidney disease, unspecified CKD stage Essential hypertension Hypoglycemia

## 2017-11-20 NOTE — PHYSICAL THERAPY INITIAL EVALUATION ADULT - PRECAUTIONS/LIMITATIONS, REHAB EVAL
, HLD, GERD, hx of syncope and fall 1 year ago felt likely 2/2 orthostatic hypotension who now presents with syncope and fall with bleeding from ear and jaw pain and right sided rib pain. Pt reports that yesterday she had gotten up to use the bathroom as she was having an IBS flare over the last 1 day with about 2-3 episodes of diarrhea that day  (loose but not watery stools). After having some diarrhea she stood up to leave the bathroom and began to feel lightheaded and dizzy without vertigo. She also felt jittery, which she reports was a similar sensation to when she has hypoglycemia with her DM2. She then passed out and woke up to find herself on the floor bleeding from the ear, with left sided jaw pain. Pt also with right sided rib pain, no other painful areas.  Pt unsure how long she was out for. CT maxillofacial: There are acute, minimally displaced fractures of the left mandibular condylar head and posterior wall the left mandibular fossa with left hemotympanum extending to the left middle ear./obesity precautions

## 2017-11-20 NOTE — PHYSICAL THERAPY INITIAL EVALUATION ADULT - PERTINENT HX OF CURRENT PROBLEM, REHAB EVAL
Pt is a 83 y/o female admitted to [facility] on 11/18/17  w/ PMhx of DM2, CKD (pt unsure of baseline Cr but reports was high enough that she had to be taken off metformin previously and that in last year it seemed to have been slightly worse-pt reports being placed on ER metformin at a low dose despite her CKD recently), HTN, IBS, osteoporosis int he past but no longer on medications and calcium supplements due to epistaxis?

## 2017-11-20 NOTE — DISCHARGE NOTE ADULT - PATIENT PORTAL LINK FT
“You can access the [facility] Patient Portal, offered by [facility], by registering with the following website: [email or web address]”

## 2017-11-20 NOTE — PHYSICAL THERAPY INITIAL EVALUATION ADULT - ADDITIONAL COMMENTS
Pt lives in a private house alone and was Ind with all ADLs. Pt amb without AD. Pt has one flight of steps to bedroom.

## 2017-11-20 NOTE — DISCHARGE NOTE ADULT - CARE PLAN
Principal Discharge DX:	Syncope and collapse  Goal:	hd stable  Instructions for follow-up, activity and diet:	Fainting usually is caused by fear, stress, pain, standing too long, over tired, overheated, going to bathroom, or coughing  Blood pressure can drop if you do not drink enough, blood pressure medication, alcohol, bleeding,  Consult with your doctor about driving  Avoid activity or condition that causes your syncope  Lay down with your feet up when you feel like you might faint  Secondary Diagnosis:	Diabetes mellitus type II, controlled Principal Discharge DX:	Syncope and collapse  Goal:	hd stable  Instructions for follow-up, activity and diet:	Fainting usually is caused by fear, stress, pain, standing too long, over tired, overheated, going to bathroom, or coughing  Blood pressure can drop if you do not drink enough, blood pressure medication, alcohol, bleeding,  Consult with your doctor about driving  Avoid activity or condition that causes your syncope  Lay down with your feet up when you feel like you might faint  Secondary Diagnosis:	Diabetes mellitus type II, controlled  Instructions for follow-up, activity and diet:	HgA1C this admission.  Make sure you get your HgA1c checked every three months.  If you take oral diabetes medications, check your blood glucose two times a day.  If you take insulin, check your blood glucose before meals and at bedtime.  It's important not to skip any meals.  Keep a log of your blood glucose results and always take it with you to your doctor appointments.  Keep a list of your current medications including injectables and over the counter medications and bring this medication list with you to all your doctor appointments.  If you have not seen your ophthalmologist this year call for appointment.  Check your feet daily for redness, sores, or openings. Do not self treat. If no improvement in two days call your primary care physician for an appointment.  Low blood sugar (hypoglycemia) is a blood sugar below 70mg/dl. Check your blood sugar if you feel signs/symptoms of hypoglycemia. If your blood sugar is below 70 take 15 grams of carbohydrates (ex 4 oz of apple juice, 3-4 glucose tablets, or 4-6 oz of regular soda) wait 15 minutes and repeat blood sugar to make sure it comes up above 70.  If your blood sugar is above 70 and you are due for a meal, have a meal.  If you are not due for a meal have a snack.  This snack helps keeps your blood sugar at a safe range.  Secondary Diagnosis:	Chronic kidney disease, unspecified CKD stage  Instructions for follow-up, activity and diet:	Avoid taking (NSAIDs) - (ex: Ibuprofen, Advil, Celebrex, Naprosyn)  Avoid taking any nephrotoxic agents (can harm kidneys) - Intravenous contrast for diagnostic testing, combination cold medications.  Have all medications adjusted for your renal function by your Health Care Provider.  Blood pressure control is important.  Take all medication as prescribed.  Secondary Diagnosis:	Essential hypertension  Instructions for follow-up, activity and diet:	Follow up with your medical doctor to establish long term blood pressure treatment goals.  Secondary Diagnosis:	Hypoglycemia  Instructions for follow-up, activity and diet:	stop all diabetes medications, ie metformin, glipizide etc  avoid concentrated sweets

## 2017-11-20 NOTE — DISCHARGE NOTE ADULT - PLAN OF CARE
hd stable Fainting usually is caused by fear, stress, pain, standing too long, over tired, overheated, going to bathroom, or coughing  Blood pressure can drop if you do not drink enough, blood pressure medication, alcohol, bleeding,  Consult with your doctor about driving  Avoid activity or condition that causes your syncope  Lay down with your feet up when you feel like you might faint HgA1C this admission.  Make sure you get your HgA1c checked every three months.  If you take oral diabetes medications, check your blood glucose two times a day.  If you take insulin, check your blood glucose before meals and at bedtime.  It's important not to skip any meals.  Keep a log of your blood glucose results and always take it with you to your doctor appointments.  Keep a list of your current medications including injectables and over the counter medications and bring this medication list with you to all your doctor appointments.  If you have not seen your ophthalmologist this year call for appointment.  Check your feet daily for redness, sores, or openings. Do not self treat. If no improvement in two days call your primary care physician for an appointment.  Low blood sugar (hypoglycemia) is a blood sugar below 70mg/dl. Check your blood sugar if you feel signs/symptoms of hypoglycemia. If your blood sugar is below 70 take 15 grams of carbohydrates (ex 4 oz of apple juice, 3-4 glucose tablets, or 4-6 oz of regular soda) wait 15 minutes and repeat blood sugar to make sure it comes up above 70.  If your blood sugar is above 70 and you are due for a meal, have a meal.  If you are not due for a meal have a snack.  This snack helps keeps your blood sugar at a safe range. Avoid taking (NSAIDs) - (ex: Ibuprofen, Advil, Celebrex, Naprosyn)  Avoid taking any nephrotoxic agents (can harm kidneys) - Intravenous contrast for diagnostic testing, combination cold medications.  Have all medications adjusted for your renal function by your Health Care Provider.  Blood pressure control is important.  Take all medication as prescribed. Follow up with your medical doctor to establish long term blood pressure treatment goals. stop all diabetes medications, ie metformin, glipizide etc  avoid concentrated sweets

## 2017-11-20 NOTE — DISCHARGE NOTE ADULT - MEDICATION SUMMARY - MEDICATIONS TO TAKE
I will START or STAY ON the medications listed below when I get home from the hospital:    traZODone 150 mg oral tablet  -- 0.5 tab(s) by mouth once a day (at bedtime) then at 3AM if with insomnia  -- Indication: For Insomnia    ferrous sulfate  -- 1 tab(s) by mouth 2 times a day  -- Indication: For Supplement    ciprofloxacin-dexamethasone 0.3%-0.1% otic suspension  -- 5 drop(s) to each affected ear every 12 hours for 7 days   -- Indication: For Ear drops    omeprazole 20 mg oral delayed release capsule  -- 1 cap(s) by mouth once a day  -- Indication: For gerd    calcium (as carbonate)-vitamin D 250 mg-125 intl units oral tablet  -- 1 tab(s) by mouth once a day  -- Indication: For Supplement    Vitamin D3 2000 intl units oral tablet  -- 1 tab(s) by mouth once a day  -- Indication: For Supplement    cyanocobalamin  -- 1 tab(s) by mouth once a day  -- Indication: For Supplement

## 2017-11-20 NOTE — PROGRESS NOTE ADULT - SUBJECTIVE AND OBJECTIVE BOX
Patient is a 82y old  Female who presents with a chief complaint of syncope/collapse (18 Nov 2017 05:50)      SUBJECTIVE / OVERNIGHT EVENTS:    Patient seen and examined. denies cp sob nvm dizziness. co left ear and jaw pain      Vital Signs Last 24 Hrs  T(C): 36.8 (20 Nov 2017 05:30), Max: 37.2 (19 Nov 2017 13:08)  T(F): 98.2 (20 Nov 2017 05:30), Max: 99 (19 Nov 2017 13:08)  HR: 84 (20 Nov 2017 05:30) (70 - 84)  BP: 174/84 (20 Nov 2017 05:30) (165/90 - 183/79)  BP(mean): --  RR: 18 (20 Nov 2017 05:30) (18 - 18)  SpO2: 90% (20 Nov 2017 05:30) (90% - 95%)  I&O's Summary    19 Nov 2017 07:01  -  20 Nov 2017 07:00  --------------------------------------------------------  IN: 960 mL / OUT: 0 mL / NET: 960 mL        PE:  GENERAL: NAD, AAOx3  HEAD:  Atraumatic, Normocephalic  EYES: EOMI, PERRLA, conjunctiva and sclera clear  NECK: Supple, No JVD, ttp left jaw  CHEST/LUNG: CTABL, No wheeze  HEART: Regular rate and rhythm; + murmur  ABDOMEN: Soft, Nontender, Nondistended; Bowel sounds present  EXTREMITIES:  2+ Peripheral Pulses, No clubbing, cyanosis, or edema  SKIN: echymosis under chin  NEURO: No focal deficits    LABS:                        10.2   6.6   )-----------( 324      ( 19 Nov 2017 07:00 )             30.6     11-19    140  |  101  |  21  ----------------------------<  103<H>  4.5   |  27  |  1.74<H>    Ca    9.5      19 Nov 2017 07:00        CAPILLARY BLOOD GLUCOSE      POCT Blood Glucose.: 131 mg/dL (20 Nov 2017 08:48)  POCT Blood Glucose.: 142 mg/dL (19 Nov 2017 20:47)  POCT Blood Glucose.: 129 mg/dL (19 Nov 2017 17:57)  POCT Blood Glucose.: 135 mg/dL (19 Nov 2017 13:04)            RADIOLOGY & ADDITIONAL TESTS:    Imaging Personally Reviewed:  [x] YES  [ ] NO    Consultant(s) Notes Reviewed:  [x] YES  [ ] NO    MEDICATIONS  (STANDING):  calcium carbonate  625 mG + Vitamin D (OsCal 250 + D) 1 Tablet(s) Oral daily  cholecalciferol 1000 Unit(s) Oral daily  cyanocobalamin 100 MICROGram(s) Oral daily  dextrose 5%. 1000 milliLiter(s) (50 mL/Hr) IV Continuous <Continuous>  dextrose 50% Injectable 12.5 Gram(s) IV Push once  dextrose 50% Injectable 25 Gram(s) IV Push once  dextrose 50% Injectable 25 Gram(s) IV Push once  ferrous    sulfate 325 milliGRAM(s) Oral daily  insulin lispro (HumaLOG) corrective regimen sliding scale   SubCutaneous three times a day before meals  insulin lispro (HumaLOG) corrective regimen sliding scale   SubCutaneous at bedtime  lisinopril 5 milliGRAM(s) Oral daily  pantoprazole    Tablet 40 milliGRAM(s) Oral before breakfast  senna 2 Tablet(s) Oral at bedtime  traZODone 75 milliGRAM(s) Oral at bedtime    MEDICATIONS  (PRN):  acetaminophen   Tablet. 650 milliGRAM(s) Oral every 6 hours PRN Mild Pain (1 - 3)  dextrose Gel 1 Dose(s) Oral once PRN Blood Glucose LESS THAN 70 milliGRAM(s)/deciliter  glucagon  Injectable 1 milliGRAM(s) IntraMuscular once PRN Glucose LESS THAN 70 milligrams/deciliter  morphine  - Injectable 2 milliGRAM(s) IV Push every 4 hours PRN Severe Pain (7 - 10)  oxyCODONE    5 mG/acetaminophen 325 mG 1 Tablet(s) Oral every 4 hours PRN Moderate Pain (4 - 6)      Care Discussed with Consultants/Other Providers [x] YES  [ ] NO    HEALTH ISSUES - PROBLEM Dx:  Chronic kidney disease, unspecified CKD stage: Chronic kidney disease, unspecified CKD stage  Essential hypertension: Essential hypertension  Irritable bowel syndrome with diarrhea: Irritable bowel syndrome with diarrhea  Open fracture of left side of mandible, initial encounter: Open fracture of left side of mandible, initial encounter  Polypharmacy: Polypharmacy  Hypoglycemia: Hypoglycemia  Syncope and collapse: Syncope and collapse  Bleeding from left ear: Bleeding from left ear Patient is a 82y old  Female who presents with a chief complaint of syncope/collapse (18 Nov 2017 05:50)      SUBJECTIVE / OVERNIGHT EVENTS:    Patient seen and examined. denies cp sob nvm dizziness. co left ear and jaw pain. BP elevated.      Vital Signs Last 24 Hrs  T(C): 36.8 (20 Nov 2017 05:30), Max: 37.2 (19 Nov 2017 13:08)  T(F): 98.2 (20 Nov 2017 05:30), Max: 99 (19 Nov 2017 13:08)  HR: 84 (20 Nov 2017 05:30) (70 - 84)  BP: 174/84 (20 Nov 2017 05:30) (165/90 - 183/79)  BP(mean): --  RR: 18 (20 Nov 2017 05:30) (18 - 18)  SpO2: 90% (20 Nov 2017 05:30) (90% - 95%)  I&O's Summary    19 Nov 2017 07:01  -  20 Nov 2017 07:00  --------------------------------------------------------  IN: 960 mL / OUT: 0 mL / NET: 960 mL        PE:  GENERAL: NAD, AAOx3  HEAD:  Atraumatic, Normocephalic  EYES: EOMI, PERRLA, conjunctiva and sclera clear  NECK: Supple, No JVD, ttp left jaw  CHEST/LUNG: CTABL, No wheeze  HEART: Regular rate and rhythm; + murmur  ABDOMEN: Soft, Nontender, Nondistended; Bowel sounds present  EXTREMITIES:  2+ Peripheral Pulses, No clubbing, cyanosis, or edema  SKIN: echymosis under chin  NEURO: No focal deficits    LABS:                        10.2   6.6   )-----------( 324      ( 19 Nov 2017 07:00 )             30.6     11-19    140  |  101  |  21  ----------------------------<  103<H>  4.5   |  27  |  1.74<H>    Ca    9.5      19 Nov 2017 07:00        CAPILLARY BLOOD GLUCOSE      POCT Blood Glucose.: 131 mg/dL (20 Nov 2017 08:48)  POCT Blood Glucose.: 142 mg/dL (19 Nov 2017 20:47)  POCT Blood Glucose.: 129 mg/dL (19 Nov 2017 17:57)  POCT Blood Glucose.: 135 mg/dL (19 Nov 2017 13:04)            RADIOLOGY & ADDITIONAL TESTS:    Imaging Personally Reviewed:  [x] YES  [ ] NO    Consultant(s) Notes Reviewed:  [x] YES  [ ] NO    MEDICATIONS  (STANDING):  calcium carbonate  625 mG + Vitamin D (OsCal 250 + D) 1 Tablet(s) Oral daily  cholecalciferol 1000 Unit(s) Oral daily  cyanocobalamin 100 MICROGram(s) Oral daily  dextrose 5%. 1000 milliLiter(s) (50 mL/Hr) IV Continuous <Continuous>  dextrose 50% Injectable 12.5 Gram(s) IV Push once  dextrose 50% Injectable 25 Gram(s) IV Push once  dextrose 50% Injectable 25 Gram(s) IV Push once  ferrous    sulfate 325 milliGRAM(s) Oral daily  insulin lispro (HumaLOG) corrective regimen sliding scale   SubCutaneous three times a day before meals  insulin lispro (HumaLOG) corrective regimen sliding scale   SubCutaneous at bedtime  lisinopril 5 milliGRAM(s) Oral daily  pantoprazole    Tablet 40 milliGRAM(s) Oral before breakfast  senna 2 Tablet(s) Oral at bedtime  traZODone 75 milliGRAM(s) Oral at bedtime    MEDICATIONS  (PRN):  acetaminophen   Tablet. 650 milliGRAM(s) Oral every 6 hours PRN Mild Pain (1 - 3)  dextrose Gel 1 Dose(s) Oral once PRN Blood Glucose LESS THAN 70 milliGRAM(s)/deciliter  glucagon  Injectable 1 milliGRAM(s) IntraMuscular once PRN Glucose LESS THAN 70 milligrams/deciliter  morphine  - Injectable 2 milliGRAM(s) IV Push every 4 hours PRN Severe Pain (7 - 10)  oxyCODONE    5 mG/acetaminophen 325 mG 1 Tablet(s) Oral every 4 hours PRN Moderate Pain (4 - 6)      Care Discussed with Consultants/Other Providers [x] YES  [ ] NO    HEALTH ISSUES - PROBLEM Dx:  Chronic kidney disease, unspecified CKD stage: Chronic kidney disease, unspecified CKD stage  Essential hypertension: Essential hypertension  Irritable bowel syndrome with diarrhea: Irritable bowel syndrome with diarrhea  Open fracture of left side of mandible, initial encounter: Open fracture of left side of mandible, initial encounter  Polypharmacy: Polypharmacy  Hypoglycemia: Hypoglycemia  Syncope and collapse: Syncope and collapse  Bleeding from left ear: Bleeding from left ear

## 2017-11-20 NOTE — DISCHARGE NOTE ADULT - MEDICATION SUMMARY - MEDICATIONS TO STOP TAKING
I will STOP taking the medications listed below when I get home from the hospital:    metformin 1000 mg oral tablet  -- 1 tab(s) by mouth 2 times a day    glipiZIDE 5 mg oral tablet  -- 1 tab(s) by mouth once a day    glimepiride 1 mg oral tablet  -- 1 tab(s) by mouth 2 times a day    amLODIPine 2.5 mg oral tablet  -- 1 tab(s) by mouth once a day    ramipril 1.25 mg oral tablet  -- 1 tab(s) by mouth every other day    metFORMIN 500 mg oral tablet, extended release  -- 1 tab(s) by mouth once a day

## 2017-11-20 NOTE — DISCHARGE NOTE ADULT - VISION (WITH CORRECTIVE LENSES IF THE PATIENT USUALLY WEARS THEM):
patient has glasses on/Partially impaired: cannot see medication labels or newsprint, but can see obstacles in path, and the surrounding layout; can count fingers at arm's length

## 2017-11-20 NOTE — PROGRESS NOTE ADULT - SUBJECTIVE AND OBJECTIVE BOX
[facility] Cardiology Progress Note  _______________________________    Pt. seen and examined. No new cardiac-related complaints.    Telemetry - sinus 60-80's, briefly sinus tach up to 130's.    T(C): 36.8 (11-20-17 @ 05:30), Max: 37.2 (11-19-17 @ 13:08)  HR: 84 (11-20-17 @ 05:30) (70 - 84)  BP: 174/84 (11-20-17 @ 05:30) (165/90 - 183/79)  RR: 18 (11-20-17 @ 05:30) (18 - 18)  SpO2: 90% (11-20-17 @ 05:30) (90% - 95%)  I&O's Summary    19 Nov 2017 07:01  -  20 Nov 2017 07:00  --------------------------------------------------------  IN: 960 mL / OUT: 0 mL / NET: 960 mL        PHYSICAL EXAM:  GENERAL: Alert, NAD.  NECK: Supple, No JVD, no carotid bruit.  CHEST/LUNG: Clear to auscultation bilaterally; No wheezes, rales, or rhonchi.  HEART: S1 S2 normal, RRR; No murmurs, rubs, or gallops  ABDOMEN: Soft, Nontender, Nondistended; Bowel sounds present  EXTREMITIES:  No LE edema.      LABS:                        10.2   6.6   )-----------( 324      ( 19 Nov 2017 07:00 )             30.6     11-19    140  |  101  |  21  ----------------------------<  103<H>  4.5   |  27  |  1.74<H>    Ca    9.5      19 Nov 2017 07:00        CARDIAC MARKERS ( 18 Nov 2017 03:36 )  x     / <0.01 ng/mL / x     / x     / x      CARDIAC MARKERS ( 17 Nov 2017 20:52 )  <.015 ng/mL / x     / 156 U/L / x     / 2.3 ng/mL          MEDICATIONS  (STANDING):  calcium carbonate  625 mG + Vitamin D (OsCal 250 + D) 1 Tablet(s) Oral daily  cholecalciferol 1000 Unit(s) Oral daily  cyanocobalamin 100 MICROGram(s) Oral daily  dextrose 5%. 1000 milliLiter(s) (50 mL/Hr) IV Continuous <Continuous>  dextrose 50% Injectable 12.5 Gram(s) IV Push once  dextrose 50% Injectable 25 Gram(s) IV Push once  dextrose 50% Injectable 25 Gram(s) IV Push once  ferrous    sulfate 325 milliGRAM(s) Oral daily  insulin lispro (HumaLOG) corrective regimen sliding scale   SubCutaneous three times a day before meals  insulin lispro (HumaLOG) corrective regimen sliding scale   SubCutaneous at bedtime  lisinopril 5 milliGRAM(s) Oral daily  pantoprazole    Tablet 40 milliGRAM(s) Oral before breakfast  senna 2 Tablet(s) Oral at bedtime  traZODone 75 milliGRAM(s) Oral at bedtime    MEDICATIONS  (PRN):  acetaminophen   Tablet. 650 milliGRAM(s) Oral every 6 hours PRN Mild Pain (1 - 3)  dextrose Gel 1 Dose(s) Oral once PRN Blood Glucose LESS THAN 70 milliGRAM(s)/deciliter  glucagon  Injectable 1 milliGRAM(s) IntraMuscular once PRN Glucose LESS THAN 70 milligrams/deciliter  morphine  - Injectable 2 milliGRAM(s) IV Push every 4 hours PRN Severe Pain (7 - 10)  oxyCODONE    5 mG/acetaminophen 325 mG 1 Tablet(s) Oral every 4 hours PRN Moderate Pain (4 - 6)      RADIOLOGY & ADDITIONAL TESTS: [facility] Cardiology Progress Note  _______________________________    Pt. seen and examined. No new cardiac-related complaints.    Telemetry - sinus 60-80's, briefly sinus tach up to 130's.    T(C): 36.8 (11-20-17 @ 05:30), Max: 37.2 (11-19-17 @ 13:08)  HR: 84 (11-20-17 @ 05:30) (70 - 84)  BP: 174/84 (11-20-17 @ 05:30) (165/90 - 183/79)  RR: 18 (11-20-17 @ 05:30) (18 - 18)  SpO2: 90% (11-20-17 @ 05:30) (90% - 95%)  I&O's Summary    19 Nov 2017 07:01  -  20 Nov 2017 07:00  --------------------------------------------------------  IN: 960 mL / OUT: 0 mL / NET: 960 mL        PHYSICAL EXAM:  GENERAL: Alert, NAD.  NECK: Supple, No JVD, no carotid bruit.  CHEST/LUNG: Dry crackles at b/l bases, otherwise clear.  HEART: S1 S2 normal, RRR; No murmurs, rubs, or gallops  ABDOMEN: Soft, Nontender, Nondistended; Bowel sounds present  EXTREMITIES:  No LE edema.      LABS:                        10.2   6.6   )-----------( 324      ( 19 Nov 2017 07:00 )             30.6     11-19    140  |  101  |  21  ----------------------------<  103<H>  4.5   |  27  |  1.74<H>    Ca    9.5      19 Nov 2017 07:00        CARDIAC MARKERS ( 18 Nov 2017 03:36 )  x     / <0.01 ng/mL / x     / x     / x      CARDIAC MARKERS ( 17 Nov 2017 20:52 )  <.015 ng/mL / x     / 156 U/L / x     / 2.3 ng/mL          MEDICATIONS  (STANDING):  calcium carbonate  625 mG + Vitamin D (OsCal 250 + D) 1 Tablet(s) Oral daily  cholecalciferol 1000 Unit(s) Oral daily  cyanocobalamin 100 MICROGram(s) Oral daily  dextrose 5%. 1000 milliLiter(s) (50 mL/Hr) IV Continuous <Continuous>  dextrose 50% Injectable 12.5 Gram(s) IV Push once  dextrose 50% Injectable 25 Gram(s) IV Push once  dextrose 50% Injectable 25 Gram(s) IV Push once  ferrous    sulfate 325 milliGRAM(s) Oral daily  insulin lispro (HumaLOG) corrective regimen sliding scale   SubCutaneous three times a day before meals  insulin lispro (HumaLOG) corrective regimen sliding scale   SubCutaneous at bedtime  lisinopril 5 milliGRAM(s) Oral daily  pantoprazole    Tablet 40 milliGRAM(s) Oral before breakfast  senna 2 Tablet(s) Oral at bedtime  traZODone 75 milliGRAM(s) Oral at bedtime    MEDICATIONS  (PRN):  acetaminophen   Tablet. 650 milliGRAM(s) Oral every 6 hours PRN Mild Pain (1 - 3)  dextrose Gel 1 Dose(s) Oral once PRN Blood Glucose LESS THAN 70 milliGRAM(s)/deciliter  glucagon  Injectable 1 milliGRAM(s) IntraMuscular once PRN Glucose LESS THAN 70 milligrams/deciliter  morphine  - Injectable 2 milliGRAM(s) IV Push every 4 hours PRN Severe Pain (7 - 10)  oxyCODONE    5 mG/acetaminophen 325 mG 1 Tablet(s) Oral every 4 hours PRN Moderate Pain (4 - 6)      RADIOLOGY & ADDITIONAL TESTS:

## 2017-11-20 NOTE — PROGRESS NOTE ADULT - PROBLEM SELECTOR PLAN 5
cont acei elevated BP despite positive orthostatics  will increase lisinopril, was on 5mg, give addition 10mg today, resume 20mg daily tomorrow

## 2017-11-20 NOTE — PROGRESS NOTE ADULT - ASSESSMENT
83yo F PMHx DM2, CKD, HTN, IBS, osteoporosis int he past but no longer on medications and calcium supplements due to epistaxis?, HLD, GERD, hx of syncope and fall 1 year ago felt likely 2/2 orthostatic hypotension who now presents with syncope and fall found to have multiple fractures 81yo F PMHx DM2, CKD, HTN, IBS, osteoporosis in the past but no longer on medications and calcium supplements due to epistaxis?, HLD, GERD, hx of syncope and fall 1 year ago felt likely 2/2 orthostatic hypotension who now presents with syncope and fall found to have multiple fractures

## 2017-11-21 VITALS
SYSTOLIC BLOOD PRESSURE: 165 MMHG | DIASTOLIC BLOOD PRESSURE: 74 MMHG | TEMPERATURE: 98 F | OXYGEN SATURATION: 94 % | HEART RATE: 71 BPM | RESPIRATION RATE: 18 BRPM

## 2017-11-21 LAB
ANION GAP SERPL CALC-SCNC: 14 MMOL/L — SIGNIFICANT CHANGE UP (ref 5–17)
BUN SERPL-MCNC: 28 MG/DL — HIGH (ref 7–23)
CALCIUM SERPL-MCNC: 9.4 MG/DL — SIGNIFICANT CHANGE UP (ref 8.4–10.5)
CHLORIDE SERPL-SCNC: 101 MMOL/L — SIGNIFICANT CHANGE UP (ref 96–108)
CO2 SERPL-SCNC: 25 MMOL/L — SIGNIFICANT CHANGE UP (ref 22–31)
CREAT SERPL-MCNC: 1.75 MG/DL — HIGH (ref 0.5–1.3)
GLUCOSE BLDC GLUCOMTR-MCNC: 128 MG/DL — HIGH (ref 70–99)
GLUCOSE BLDC GLUCOMTR-MCNC: 133 MG/DL — HIGH (ref 70–99)
GLUCOSE BLDC GLUCOMTR-MCNC: 140 MG/DL — HIGH (ref 70–99)
GLUCOSE SERPL-MCNC: 133 MG/DL — HIGH (ref 70–99)
POTASSIUM SERPL-MCNC: 4.1 MMOL/L — SIGNIFICANT CHANGE UP (ref 3.5–5.3)
POTASSIUM SERPL-SCNC: 4.1 MMOL/L — SIGNIFICANT CHANGE UP (ref 3.5–5.3)
SODIUM SERPL-SCNC: 140 MMOL/L — SIGNIFICANT CHANGE UP (ref 135–145)

## 2017-11-21 RX ORDER — GLIMEPIRIDE 1 MG
1 TABLET ORAL
Qty: 0 | Refills: 0 | COMMUNITY

## 2017-11-21 RX ORDER — CIPROFLOXACIN AND DEXAMETHASONE 3; 1 MG/ML; MG/ML
5 SUSPENSION/ DROPS AURICULAR (OTIC)
Qty: 1 | Refills: 0
Start: 2017-11-21 | End: 2017-11-28

## 2017-11-21 RX ORDER — AMLODIPINE BESYLATE 2.5 MG/1
1 TABLET ORAL
Qty: 0 | Refills: 0 | COMMUNITY

## 2017-11-21 RX ADMIN — Medication 325 MILLIGRAM(S): at 11:24

## 2017-11-21 RX ADMIN — CIPROFLOXACIN AND DEXAMETHASONE 5 DROP(S): 3; 1 SUSPENSION/ DROPS AURICULAR (OTIC) at 05:45

## 2017-11-21 RX ADMIN — Medication 1000 UNIT(S): at 11:22

## 2017-11-21 RX ADMIN — PREGABALIN 100 MICROGRAM(S): 225 CAPSULE ORAL at 11:22

## 2017-11-21 RX ADMIN — PANTOPRAZOLE SODIUM 40 MILLIGRAM(S): 20 TABLET, DELAYED RELEASE ORAL at 05:45

## 2017-11-21 RX ADMIN — LISINOPRIL 20 MILLIGRAM(S): 2.5 TABLET ORAL at 05:45

## 2017-11-21 RX ADMIN — Medication 1 TABLET(S): at 11:22

## 2017-11-21 RX ADMIN — CIPROFLOXACIN AND DEXAMETHASONE 5 DROP(S): 3; 1 SUSPENSION/ DROPS AURICULAR (OTIC) at 17:16

## 2017-11-21 NOTE — PROGRESS NOTE ADULT - SUBJECTIVE AND OBJECTIVE BOX
Patient is a 82y old  Female who presents with a chief complaint of syncope/collapse (18 Nov 2017 05:50)      SUBJECTIVE / OVERNIGHT EVENTS:    Patient seen and examined. denies cp sob nvm dizziness. BP elevated last night, states she has white coat syndrome.       Vital Signs Last 24 Hrs  T(C): 36.8 (20 Nov 2017 05:30), Max: 37.2 (19 Nov 2017 13:08)  T(F): 98.2 (20 Nov 2017 05:30), Max: 99 (19 Nov 2017 13:08)  HR: 84 (20 Nov 2017 05:30) (70 - 84)  BP: 174/84 (20 Nov 2017 05:30) (165/90 - 183/79)  BP(mean): --  RR: 18 (20 Nov 2017 05:30) (18 - 18)  SpO2: 90% (20 Nov 2017 05:30) (90% - 95%)  I&O's Summary    19 Nov 2017 07:01  -  20 Nov 2017 07:00  --------------------------------------------------------  IN: 960 mL / OUT: 0 mL / NET: 960 mL        PE:  GENERAL: NAD, AAOx3  HEAD:  Atraumatic, Normocephalic  EYES: EOMI, PERRLA, conjunctiva and sclera clear  NECK: Supple, No JVD, ttp left jaw  CHEST/LUNG: CTABL, No wheeze  HEART: Regular rate and rhythm; + murmur  ABDOMEN: Soft, Nontender, Nondistended; Bowel sounds present  EXTREMITIES:  2+ Peripheral Pulses, No clubbing, cyanosis, or edema  SKIN: echymosis under chin  NEURO: No focal deficits    LABS:                        10.2   6.6   )-----------( 324      ( 19 Nov 2017 07:00 )             30.6     11-19    140  |  101  |  21  ----------------------------<  103<H>  4.5   |  27  |  1.74<H>    Ca    9.5      19 Nov 2017 07:00        CAPILLARY BLOOD GLUCOSE      POCT Blood Glucose.: 131 mg/dL (20 Nov 2017 08:48)  POCT Blood Glucose.: 142 mg/dL (19 Nov 2017 20:47)  POCT Blood Glucose.: 129 mg/dL (19 Nov 2017 17:57)  POCT Blood Glucose.: 135 mg/dL (19 Nov 2017 13:04)            RADIOLOGY & ADDITIONAL TESTS:    Imaging Personally Reviewed:  [x] YES  [ ] NO    Consultant(s) Notes Reviewed:  [x] YES  [ ] NO    MEDICATIONS  (STANDING):  calcium carbonate  625 mG + Vitamin D (OsCal 250 + D) 1 Tablet(s) Oral daily  cholecalciferol 1000 Unit(s) Oral daily  cyanocobalamin 100 MICROGram(s) Oral daily  dextrose 5%. 1000 milliLiter(s) (50 mL/Hr) IV Continuous <Continuous>  dextrose 50% Injectable 12.5 Gram(s) IV Push once  dextrose 50% Injectable 25 Gram(s) IV Push once  dextrose 50% Injectable 25 Gram(s) IV Push once  ferrous    sulfate 325 milliGRAM(s) Oral daily  insulin lispro (HumaLOG) corrective regimen sliding scale   SubCutaneous three times a day before meals  insulin lispro (HumaLOG) corrective regimen sliding scale   SubCutaneous at bedtime  lisinopril 5 milliGRAM(s) Oral daily  pantoprazole    Tablet 40 milliGRAM(s) Oral before breakfast  senna 2 Tablet(s) Oral at bedtime  traZODone 75 milliGRAM(s) Oral at bedtime    MEDICATIONS  (PRN):  acetaminophen   Tablet. 650 milliGRAM(s) Oral every 6 hours PRN Mild Pain (1 - 3)  dextrose Gel 1 Dose(s) Oral once PRN Blood Glucose LESS THAN 70 milliGRAM(s)/deciliter  glucagon  Injectable 1 milliGRAM(s) IntraMuscular once PRN Glucose LESS THAN 70 milligrams/deciliter  morphine  - Injectable 2 milliGRAM(s) IV Push every 4 hours PRN Severe Pain (7 - 10)  oxyCODONE    5 mG/acetaminophen 325 mG 1 Tablet(s) Oral every 4 hours PRN Moderate Pain (4 - 6)      Care Discussed with Consultants/Other Providers [x] YES  [ ] NO    HEALTH ISSUES - PROBLEM Dx:  Chronic kidney disease, unspecified CKD stage: Chronic kidney disease, unspecified CKD stage  Essential hypertension: Essential hypertension  Irritable bowel syndrome with diarrhea: Irritable bowel syndrome with diarrhea  Open fracture of left side of mandible, initial encounter: Open fracture of left side of mandible, initial encounter  Polypharmacy: Polypharmacy  Hypoglycemia: Hypoglycemia  Syncope and collapse: Syncope and collapse  Bleeding from left ear: Bleeding from left ear

## 2017-11-21 NOTE — PROGRESS NOTE ADULT - PROBLEM SELECTOR PLAN 3
non-displaced left mandibular condylar head fracture and rib fracture and fracture of fossa/anterior external auditory canal with ear bleeding  incentive spirometry, ice and cold compresses  no acute surgical intervention  pain control lidoderm patch and oxycodone   Ciprodex to start 11/21, 5 drops daily for 7 days  OMFS and ENT recs appreciated  Should start antibiotic ear drops on day 3 s/p fracture per ENT recs.  outpt follow up with Dr. Adamaris Crabtree within x1 week of discharge from the hospital
non-displaced left mandibular condylar head fracture and rib fracture  incentive spirometry  ice and cold compresses  no acute surgical intervention  pain control lidoderm patch and oxycodone   Ciprodex to start 11/21, 5 drops daily   OMFS and ENT recs appreciated  Also with fracture of fossa/anterior external auditory canal with ear bleeding, evaluated by ENT  Should start antibiotic ear drops on day 3 s/p fracture per ENT recs.  outpt follow up with Dr. Adamaris Crabtree within x1 week of discharge from the hospital
non-displaced left mandibular condylar head fracture and rib fracture  incentive spirometry  ice and cold compresses  no acute surgical intervention  pain control lidoderm patch and oxycodone   Ciprodex to start 11/21, 5 drops daily for 7 days  OMFS and ENT recs appreciated  Also with fracture of fossa/anterior external auditory canal with ear bleeding, evaluated by ENT  Should start antibiotic ear drops on day 3 s/p fracture per ENT recs.  outpt follow up with Dr. Adamaris Crabtree within x1 week of discharge from the hospital

## 2017-11-21 NOTE — PROGRESS NOTE ADULT - PROBLEM SELECTOR PLAN 2
Pt with frequent episodes of hypoglycemia  has not required SSI while inpatient  DC all meds and outpt fu
Pt with frequent episodes of hypoglycemia  given CKD would not resume metformin or glimepiride either, if patient requires diabetes medications can consider sitagliptin.
Pt with frequent episodes of hypoglycemia  given CKD would not resume metformin or glimepiride either, if patient requires diabetes medications can consider sitagliptin.

## 2017-11-21 NOTE — PROGRESS NOTE ADULT - PROBLEM SELECTOR PLAN 1
most likely related to orthostatic hypotension  orthostatics neg  appreciate cardio recs  TTE result pending
-  - Repeat orthostatics were negative this AM  - no significant tele. events.  - await echo.  - will follow.    Kieran Wall M.D., [facility]  [phone]
-  - orthostatics were borderline yest. Repeat them this AM.  - no significant tele. events so far.  - await echo.  - will follow.    Kieran Wall M.D., [facility]  [phone]
most likely related to orthostatic hypotension  check orthostatic VS  appreciate cardio recs  TTE
most likely related to orthostatic hypotension  recheck orthostatic VS  appreciate cardio recs  TTE pending

## 2017-11-21 NOTE — PROGRESS NOTE ADULT - PROBLEM SELECTOR PROBLEM 3
Open fracture of left side of mandible, initial encounter

## 2017-11-21 NOTE — PROGRESS NOTE ADULT - ASSESSMENT
83yo F PMHx DM2, CKD, HTN, IBS, osteoporosis in the past but no longer on medications and calcium supplements due to epistaxis?, HLD, GERD, hx of syncope and fall 1 year ago felt likely 2/2 orthostatic hypotension who now presents with syncope and fall found to have multiple fractures

## 2017-11-21 NOTE — PROGRESS NOTE ADULT - SUBJECTIVE AND OBJECTIVE BOX
[facility] Cardiology Progress Note  _______________________________    Pt. seen and examined. No new cardiac-related complaints.    Telemetry - Sinus 60-90's    T(C): 36.7 (11-21-17 @ 04:42), Max: 37.2 (11-20-17 @ 12:39)  HR: 71 (11-21-17 @ 04:42) (71 - 103)  BP: 151/78 (11-21-17 @ 04:42) (126/70 - 184/91)  RR: 18 (11-21-17 @ 04:42) (18 - 18)  SpO2: 93% (11-21-17 @ 04:42) (93% - 95%)  I&O's Summary    20 Nov 2017 07:01  -  21 Nov 2017 07:00  --------------------------------------------------------  IN: 1060 mL / OUT: 0 mL / NET: 1060 mL        PHYSICAL EXAM:  GENERAL: Alert, NAD.  NECK: Supple, No JVD, no carotid bruit.  CHEST/LUNG: Dry crackles at b/l bases, otherwise clear.  HEART: S1 S2 normal, RRR; No murmurs, rubs, or gallops  ABDOMEN: Soft, Nontender, Nondistended; Bowel sounds present  EXTREMITIES:  No LE edema.    LABS:                        12.5   10.4  )-----------( 448      ( 20 Nov 2017 09:34 )             37.5     11-21    140  |  101  |  28<H>  ----------------------------<  133<H>  4.1   |  25  |  1.75<H>    Ca    9.4      21 Nov 2017 06:07        CARDIAC MARKERS ( 18 Nov 2017 03:36 )  x     / <0.01 ng/mL / x     / x     / x      CARDIAC MARKERS ( 17 Nov 2017 20:52 )  <.015 ng/mL / x     / 156 U/L / x     / 2.3 ng/mL      MEDICATIONS  (STANDING):  calcium carbonate  625 mG + Vitamin D (OsCal 250 + D) 1 Tablet(s) Oral daily  cholecalciferol 1000 Unit(s) Oral daily  ciprofloxacin/dexamethasone Suspension Otic 5 Drop(s) Left Ear every 12 hours  cyanocobalamin 100 MICROGram(s) Oral daily  dextrose 5%. 1000 milliLiter(s) (50 mL/Hr) IV Continuous <Continuous>  dextrose 50% Injectable 12.5 Gram(s) IV Push once  dextrose 50% Injectable 25 Gram(s) IV Push once  dextrose 50% Injectable 25 Gram(s) IV Push once  ferrous    sulfate 325 milliGRAM(s) Oral daily  insulin lispro (HumaLOG) corrective regimen sliding scale   SubCutaneous three times a day before meals  insulin lispro (HumaLOG) corrective regimen sliding scale   SubCutaneous at bedtime  lisinopril 20 milliGRAM(s) Oral daily  pantoprazole    Tablet 40 milliGRAM(s) Oral before breakfast  senna 2 Tablet(s) Oral at bedtime  traZODone 75 milliGRAM(s) Oral at bedtime    MEDICATIONS  (PRN):  acetaminophen   Tablet. 650 milliGRAM(s) Oral every 6 hours PRN Mild Pain (1 - 3)  dextrose Gel 1 Dose(s) Oral once PRN Blood Glucose LESS THAN 70 milliGRAM(s)/deciliter  glucagon  Injectable 1 milliGRAM(s) IntraMuscular once PRN Glucose LESS THAN 70 milligrams/deciliter  oxyCODONE    5 mG/acetaminophen 325 mG 1 Tablet(s) Oral every 4 hours PRN Moderate Pain (4 - 6)      RADIOLOGY & ADDITIONAL TESTS:

## 2017-11-21 NOTE — PROGRESS NOTE ADULT - PROBLEM SELECTOR PROBLEM 4
Irritable bowel syndrome with diarrhea

## 2017-11-21 NOTE — PROGRESS NOTE ADULT - PROBLEM SELECTOR PROBLEM 1
Syncope and collapse

## 2017-12-19 PROCEDURE — 72170 X-RAY EXAM OF PELVIS: CPT

## 2017-12-19 PROCEDURE — 84484 ASSAY OF TROPONIN QUANT: CPT

## 2017-12-19 PROCEDURE — 97161 PT EVAL LOW COMPLEX 20 MIN: CPT

## 2017-12-19 PROCEDURE — 70486 CT MAXILLOFACIAL W/O DYE: CPT

## 2017-12-19 PROCEDURE — 80048 BASIC METABOLIC PNL TOTAL CA: CPT

## 2017-12-19 PROCEDURE — 99285 EMERGENCY DEPT VISIT HI MDM: CPT | Mod: 25

## 2017-12-19 PROCEDURE — 96374 THER/PROPH/DIAG INJ IV PUSH: CPT

## 2017-12-19 PROCEDURE — 93306 TTE W/DOPPLER COMPLETE: CPT

## 2017-12-19 PROCEDURE — 82962 GLUCOSE BLOOD TEST: CPT

## 2017-12-19 PROCEDURE — 85027 COMPLETE CBC AUTOMATED: CPT

## 2017-12-19 PROCEDURE — 93005 ELECTROCARDIOGRAM TRACING: CPT

## 2019-04-18 PROBLEM — N18.9 CHRONIC KIDNEY DISEASE, UNSPECIFIED: Chronic | Status: ACTIVE | Noted: 2017-11-18

## 2019-04-18 PROBLEM — I95.1 ORTHOSTATIC HYPOTENSION: Chronic | Status: ACTIVE | Noted: 2017-11-18

## 2019-04-18 PROBLEM — K58.0 IRRITABLE BOWEL SYNDROME WITH DIARRHEA: Chronic | Status: ACTIVE | Noted: 2017-11-18

## 2019-05-29 NOTE — PATIENT PROFILE ADULT. - NUTRITION PROFILE
Problem: Patient/Family Goals  Goal: Patient/Family Long Term Goal  Description  Patient's Long Term Goal: Go home    Interventions:  - frequent resp assessments  -albuterol nebs per MD order  - See additional Care Plan goals for specific interventions PEDIATRIC  Goal: Verbalizes/displays adequate comfort level or patient's stated pain goal  Description  INTERVENTIONS:  - Encourage pt to monitor pain and request assistance  - Assess pain using appropriate pain scale  - Administer analgesics based on type system  Outcome: Progressing     Problem: METABOLIC AND ELECTROLYTES - PEDIATRIC  Goal: Electrolytes maintained within normal limits  Description  INTERVENTIONS:  - Monitor labs and rhythm and assess patient for signs and symptoms of electrolyte imbalances no indicators present

## 2019-06-14 ENCOUNTER — APPOINTMENT (OUTPATIENT)
Dept: OTOLARYNGOLOGY | Facility: CLINIC | Age: 84
End: 2019-06-14
Payer: MEDICARE

## 2019-06-14 VITALS — BODY MASS INDEX: 27.21 KG/M2 | WEIGHT: 135 LBS | HEART RATE: 65 BPM | HEIGHT: 59 IN

## 2019-06-14 DIAGNOSIS — H91.93 UNSPECIFIED HEARING LOSS, BILATERAL: ICD-10-CM

## 2019-06-14 DIAGNOSIS — H90.3 SENSORINEURAL HEARING LOSS, BILATERAL: ICD-10-CM

## 2019-06-14 DIAGNOSIS — H61.23 IMPACTED CERUMEN, BILATERAL: ICD-10-CM

## 2019-06-14 PROCEDURE — 69210 REMOVE IMPACTED EAR WAX UNI: CPT

## 2019-06-14 PROCEDURE — 99213 OFFICE O/P EST LOW 20 MIN: CPT | Mod: 25

## 2019-06-14 RX ORDER — ENALAPRIL MALEATE 5 MG/1
TABLET ORAL
Refills: 0 | Status: DISCONTINUED | COMMUNITY
End: 2019-06-14

## 2019-06-14 RX ORDER — METFORMIN HYDROCHLORIDE 500 MG/1
500 TABLET, EXTENDED RELEASE ORAL
Refills: 0 | Status: ACTIVE | COMMUNITY

## 2019-06-14 RX ORDER — SIMVASTATIN 80 MG/1
TABLET, FILM COATED ORAL
Refills: 0 | Status: DISCONTINUED | COMMUNITY
End: 2019-06-14

## 2019-06-14 RX ORDER — BACILLUS COAGULANS/INULIN 1B-250 MG
CAPSULE ORAL
Refills: 0 | Status: ACTIVE | COMMUNITY

## 2019-06-14 RX ORDER — MAGNESIUM OXIDE/MAG AA CHELATE 300 MG
CAPSULE ORAL
Refills: 0 | Status: ACTIVE | COMMUNITY

## 2019-06-14 RX ORDER — UBIDECARENONE/VIT E ACET 100MG-5
CAPSULE ORAL
Refills: 0 | Status: ACTIVE | COMMUNITY

## 2019-06-14 NOTE — REASON FOR VISIT
[Initial Evaluation] : an initial evaluation for [Other: _____] : [unfilled] [FreeTextEntry2] : hearing loss, presents for wax removal

## 2019-06-14 NOTE — HISTORY OF PRESENT ILLNESS
[de-identified] : 84 year old female presents for wax removal . Reports gradual bilateral hearing loss in the past few years, with use of hearing aids over the past year. Patient denies otalgia, otorrhea, ear infections, tinnitus, dizziness, vertigo, headaches related to hearing.\par \par \par

## 2019-06-14 NOTE — REVIEW OF SYSTEMS
[As Noted in HPI] : as noted in HPI [Hearing Loss] : hearing loss [Throat Clearing] : throat clearing [Joint Pain] : joint pain [Negative] : Heme/Lymph

## 2019-06-14 NOTE — PROCEDURE
[FreeTextEntry3] : Procedure note:  Bilateral cerumenectomy\par \par Jun 14, 2019 \par \par Description of Procedure:   Bilateral cerumen impactions were noted requiring debridement under the operating microscope using otologic instrumentation.  The patient tolerated the procedure without complications.\par \par

## 2021-06-14 ENCOUNTER — INPATIENT (INPATIENT)
Facility: HOSPITAL | Age: 86
LOS: 4 days | Discharge: HOME | DRG: 368 | End: 2021-06-19
Attending: STUDENT IN AN ORGANIZED HEALTH CARE EDUCATION/TRAINING PROGRAM | Admitting: GENERAL PRACTICE
Payer: MEDICARE

## 2021-06-14 VITALS
HEIGHT: 59 IN | DIASTOLIC BLOOD PRESSURE: 69 MMHG | RESPIRATION RATE: 16 BRPM | HEART RATE: 92 BPM | WEIGHT: 125 LBS | TEMPERATURE: 98 F | OXYGEN SATURATION: 96 % | SYSTOLIC BLOOD PRESSURE: 122 MMHG

## 2021-06-14 DIAGNOSIS — K92.2 GASTROINTESTINAL HEMORRHAGE, UNSPECIFIED: ICD-10-CM

## 2021-06-14 DIAGNOSIS — D64.9 ANEMIA, UNSPECIFIED: ICD-10-CM

## 2021-06-14 DIAGNOSIS — I10 ESSENTIAL (PRIMARY) HYPERTENSION: ICD-10-CM

## 2021-06-14 DIAGNOSIS — E11.9 TYPE 2 DIABETES MELLITUS WITHOUT COMPLICATIONS: ICD-10-CM

## 2021-06-14 DIAGNOSIS — R77.8 OTHER SPECIFIED ABNORMALITIES OF PLASMA PROTEINS: ICD-10-CM

## 2021-06-14 DIAGNOSIS — N18.9 CHRONIC KIDNEY DISEASE, UNSPECIFIED: ICD-10-CM

## 2021-06-14 DIAGNOSIS — G47.00 INSOMNIA, UNSPECIFIED: ICD-10-CM

## 2021-06-14 DIAGNOSIS — Z29.9 ENCOUNTER FOR PROPHYLACTIC MEASURES, UNSPECIFIED: ICD-10-CM

## 2021-06-14 DIAGNOSIS — E78.5 HYPERLIPIDEMIA, UNSPECIFIED: ICD-10-CM

## 2021-06-14 LAB
ALBUMIN SERPL ELPH-MCNC: 2.6 G/DL — LOW (ref 3.3–5)
ALP SERPL-CCNC: 29 U/L — LOW (ref 40–120)
ALT FLD-CCNC: 8 U/L — LOW (ref 12–78)
ANION GAP SERPL CALC-SCNC: 9 MMOL/L — SIGNIFICANT CHANGE UP (ref 5–17)
APTT BLD: 23.6 SEC — LOW (ref 27.5–35.5)
AST SERPL-CCNC: 11 U/L — LOW (ref 15–37)
BASOPHILS # BLD AUTO: 0.02 K/UL — SIGNIFICANT CHANGE UP (ref 0–0.2)
BASOPHILS NFR BLD AUTO: 0.2 % — SIGNIFICANT CHANGE UP (ref 0–2)
BILIRUB SERPL-MCNC: 0.3 MG/DL — SIGNIFICANT CHANGE UP (ref 0.2–1.2)
BLD GP AB SCN SERPL QL: SIGNIFICANT CHANGE UP
BUN SERPL-MCNC: 87 MG/DL — HIGH (ref 7–23)
CALCIUM SERPL-MCNC: 8 MG/DL — LOW (ref 8.5–10.1)
CHLORIDE SERPL-SCNC: 108 MMOL/L — SIGNIFICANT CHANGE UP (ref 96–108)
CO2 SERPL-SCNC: 22 MMOL/L — SIGNIFICANT CHANGE UP (ref 22–31)
CREAT SERPL-MCNC: 4.5 MG/DL — HIGH (ref 0.5–1.3)
EOSINOPHIL # BLD AUTO: 0.19 K/UL — SIGNIFICANT CHANGE UP (ref 0–0.5)
EOSINOPHIL NFR BLD AUTO: 1.5 % — SIGNIFICANT CHANGE UP (ref 0–6)
GLUCOSE SERPL-MCNC: 200 MG/DL — HIGH (ref 70–99)
HCT VFR BLD CALC: 12.9 % — CRITICAL LOW (ref 34.5–45)
HGB BLD-MCNC: 3.8 G/DL — CRITICAL LOW (ref 11.5–15.5)
IMM GRANULOCYTES NFR BLD AUTO: 4.5 % — HIGH (ref 0–1.5)
INR BLD: 1.05 RATIO — SIGNIFICANT CHANGE UP (ref 0.88–1.16)
LIDOCAIN IGE QN: 63 U/L — LOW (ref 73–393)
LYMPHOCYTES # BLD AUTO: 1.21 K/UL — SIGNIFICANT CHANGE UP (ref 1–3.3)
LYMPHOCYTES # BLD AUTO: 9.5 % — LOW (ref 13–44)
MCHC RBC-ENTMCNC: 28.1 PG — SIGNIFICANT CHANGE UP (ref 27–34)
MCHC RBC-ENTMCNC: 29.5 GM/DL — LOW (ref 32–36)
MCV RBC AUTO: 95.6 FL — SIGNIFICANT CHANGE UP (ref 80–100)
MONOCYTES # BLD AUTO: 0.6 K/UL — SIGNIFICANT CHANGE UP (ref 0–0.9)
MONOCYTES NFR BLD AUTO: 4.7 % — SIGNIFICANT CHANGE UP (ref 2–14)
NEUTROPHILS # BLD AUTO: 10.15 K/UL — HIGH (ref 1.8–7.4)
NEUTROPHILS NFR BLD AUTO: 79.6 % — HIGH (ref 43–77)
NRBC # BLD: 0 /100 WBCS — SIGNIFICANT CHANGE UP (ref 0–0)
OB PNL STL: POSITIVE
PLATELET # BLD AUTO: 345 K/UL — SIGNIFICANT CHANGE UP (ref 150–400)
POTASSIUM SERPL-MCNC: 4.6 MMOL/L — SIGNIFICANT CHANGE UP (ref 3.5–5.3)
POTASSIUM SERPL-SCNC: 4.6 MMOL/L — SIGNIFICANT CHANGE UP (ref 3.5–5.3)
PROT SERPL-MCNC: 5.2 G/DL — LOW (ref 6–8.3)
PROTHROM AB SERPL-ACNC: 12.3 SEC — SIGNIFICANT CHANGE UP (ref 10.6–13.6)
RBC # BLD: 1.35 M/UL — LOW (ref 3.8–5.2)
RBC # FLD: 19.6 % — HIGH (ref 10.3–14.5)
SARS-COV-2 RNA SPEC QL NAA+PROBE: SIGNIFICANT CHANGE UP
SODIUM SERPL-SCNC: 139 MMOL/L — SIGNIFICANT CHANGE UP (ref 135–145)
TROPONIN I SERPL-MCNC: 0.53 NG/ML — HIGH (ref 0.01–0.04)
WBC # BLD: 12.74 K/UL — HIGH (ref 3.8–10.5)
WBC # FLD AUTO: 12.74 K/UL — HIGH (ref 3.8–10.5)

## 2021-06-14 PROCEDURE — 72125 CT NECK SPINE W/O DYE: CPT | Mod: 26,MH

## 2021-06-14 PROCEDURE — G1004: CPT

## 2021-06-14 PROCEDURE — 74176 CT ABD & PELVIS W/O CONTRAST: CPT | Mod: 26,MG

## 2021-06-14 PROCEDURE — 70450 CT HEAD/BRAIN W/O DYE: CPT | Mod: 26,MH

## 2021-06-14 PROCEDURE — 99285 EMERGENCY DEPT VISIT HI MDM: CPT

## 2021-06-14 PROCEDURE — 93010 ELECTROCARDIOGRAM REPORT: CPT

## 2021-06-14 PROCEDURE — 99223 1ST HOSP IP/OBS HIGH 75: CPT

## 2021-06-14 PROCEDURE — 99223 1ST HOSP IP/OBS HIGH 75: CPT | Mod: GC

## 2021-06-14 RX ORDER — SODIUM CHLORIDE 9 MG/ML
1000 INJECTION, SOLUTION INTRAVENOUS
Refills: 0 | Status: DISCONTINUED | OUTPATIENT
Start: 2021-06-14 | End: 2021-06-19

## 2021-06-14 RX ORDER — AMLODIPINE BESYLATE 2.5 MG/1
5 TABLET ORAL DAILY
Refills: 0 | Status: DISCONTINUED | OUTPATIENT
Start: 2021-06-14 | End: 2021-06-14

## 2021-06-14 RX ORDER — PANTOPRAZOLE SODIUM 20 MG/1
40 TABLET, DELAYED RELEASE ORAL
Refills: 0 | Status: DISCONTINUED | OUTPATIENT
Start: 2021-06-14 | End: 2021-06-19

## 2021-06-14 RX ORDER — INSULIN LISPRO 100/ML
VIAL (ML) SUBCUTANEOUS
Refills: 0 | Status: DISCONTINUED | OUTPATIENT
Start: 2021-06-14 | End: 2021-06-16

## 2021-06-14 RX ORDER — DEXTROSE 50 % IN WATER 50 %
12.5 SYRINGE (ML) INTRAVENOUS ONCE
Refills: 0 | Status: DISCONTINUED | OUTPATIENT
Start: 2021-06-14 | End: 2021-06-19

## 2021-06-14 RX ORDER — INSULIN LISPRO 100/ML
VIAL (ML) SUBCUTANEOUS AT BEDTIME
Refills: 0 | Status: DISCONTINUED | OUTPATIENT
Start: 2021-06-14 | End: 2021-06-16

## 2021-06-14 RX ORDER — GLUCAGON INJECTION, SOLUTION 0.5 MG/.1ML
1 INJECTION, SOLUTION SUBCUTANEOUS ONCE
Refills: 0 | Status: DISCONTINUED | OUTPATIENT
Start: 2021-06-14 | End: 2021-06-19

## 2021-06-14 RX ORDER — PANTOPRAZOLE SODIUM 20 MG/1
8 TABLET, DELAYED RELEASE ORAL
Qty: 80 | Refills: 0 | Status: DISCONTINUED | OUTPATIENT
Start: 2021-06-14 | End: 2021-06-14

## 2021-06-14 RX ORDER — DEXTROSE 50 % IN WATER 50 %
25 SYRINGE (ML) INTRAVENOUS ONCE
Refills: 0 | Status: DISCONTINUED | OUTPATIENT
Start: 2021-06-14 | End: 2021-06-19

## 2021-06-14 RX ORDER — METHYLPREDNISOLONE 4 MG
500 TABLET ORAL DAILY
Refills: 0 | Status: DISCONTINUED | OUTPATIENT
Start: 2021-06-14 | End: 2021-06-14

## 2021-06-14 RX ORDER — FERROUS SULFATE 325(65) MG
1 TABLET ORAL
Qty: 0 | Refills: 0 | DISCHARGE

## 2021-06-14 RX ORDER — METFORMIN HYDROCHLORIDE 850 MG/1
1 TABLET ORAL
Qty: 0 | Refills: 0 | DISCHARGE

## 2021-06-14 RX ORDER — AMLODIPINE BESYLATE 2.5 MG/1
5 TABLET ORAL DAILY
Refills: 0 | Status: DISCONTINUED | OUTPATIENT
Start: 2021-06-14 | End: 2021-06-19

## 2021-06-14 RX ORDER — RAMIPRIL 5 MG
1 CAPSULE ORAL
Qty: 0 | Refills: 0 | DISCHARGE

## 2021-06-14 RX ORDER — OXYBUTYNIN CHLORIDE 5 MG
5 TABLET ORAL
Refills: 0 | Status: DISCONTINUED | OUTPATIENT
Start: 2021-06-14 | End: 2021-06-19

## 2021-06-14 RX ORDER — SODIUM CHLORIDE 9 MG/ML
1000 INJECTION INTRAMUSCULAR; INTRAVENOUS; SUBCUTANEOUS ONCE
Refills: 0 | Status: COMPLETED | OUTPATIENT
Start: 2021-06-14 | End: 2021-06-14

## 2021-06-14 RX ORDER — PANTOPRAZOLE SODIUM 20 MG/1
40 TABLET, DELAYED RELEASE ORAL ONCE
Refills: 0 | Status: COMPLETED | OUTPATIENT
Start: 2021-06-14 | End: 2021-06-14

## 2021-06-14 RX ORDER — MAGNESIUM OXIDE 400 MG ORAL TABLET 241.3 MG
400 TABLET ORAL DAILY
Refills: 0 | Status: DISCONTINUED | OUTPATIENT
Start: 2021-06-14 | End: 2021-06-19

## 2021-06-14 RX ORDER — DEXTROSE 50 % IN WATER 50 %
15 SYRINGE (ML) INTRAVENOUS ONCE
Refills: 0 | Status: DISCONTINUED | OUTPATIENT
Start: 2021-06-14 | End: 2021-06-19

## 2021-06-14 RX ORDER — PREGABALIN 225 MG/1
1000 CAPSULE ORAL DAILY
Refills: 0 | Status: DISCONTINUED | OUTPATIENT
Start: 2021-06-14 | End: 2021-06-19

## 2021-06-14 RX ORDER — TRAZODONE HCL 50 MG
150 TABLET ORAL AT BEDTIME
Refills: 0 | Status: DISCONTINUED | OUTPATIENT
Start: 2021-06-14 | End: 2021-06-14

## 2021-06-14 RX ORDER — TRAZODONE HCL 50 MG
75 TABLET ORAL
Refills: 0 | Status: DISCONTINUED | OUTPATIENT
Start: 2021-06-14 | End: 2021-06-19

## 2021-06-14 RX ORDER — ATORVASTATIN CALCIUM 80 MG/1
20 TABLET, FILM COATED ORAL AT BEDTIME
Refills: 0 | Status: DISCONTINUED | OUTPATIENT
Start: 2021-06-14 | End: 2021-06-19

## 2021-06-14 RX ORDER — PREGABALIN 225 MG/1
1 CAPSULE ORAL
Qty: 0 | Refills: 0 | DISCHARGE

## 2021-06-14 RX ORDER — TRAZODONE HCL 50 MG
75 TABLET ORAL AT BEDTIME
Refills: 0 | Status: DISCONTINUED | OUTPATIENT
Start: 2021-06-14 | End: 2021-06-19

## 2021-06-14 RX ADMIN — SODIUM CHLORIDE 1000 MILLILITER(S): 9 INJECTION INTRAMUSCULAR; INTRAVENOUS; SUBCUTANEOUS at 13:57

## 2021-06-14 RX ADMIN — PANTOPRAZOLE SODIUM 40 MILLIGRAM(S): 20 TABLET, DELAYED RELEASE ORAL at 13:55

## 2021-06-14 RX ADMIN — Medication 75 MILLIGRAM(S): at 22:53

## 2021-06-14 RX ADMIN — PANTOPRAZOLE SODIUM 10 MG/HR: 20 TABLET, DELAYED RELEASE ORAL at 16:52

## 2021-06-14 RX ADMIN — ATORVASTATIN CALCIUM 20 MILLIGRAM(S): 80 TABLET, FILM COATED ORAL at 22:53

## 2021-06-14 NOTE — ED PROVIDER NOTE - CLINICAL SUMMARY MEDICAL DECISION MAKING FREE TEXT BOX
BIBA from home due to rectal bleeding. Pt reports having bright red blood and melena with each bowel movement since Saturday. pt reports feeling lightheaded since Saturday. pt reports she fell Saturday in which she hit her head. pt notes bruising to right forehead. (+) diarrhea. plan includes labs, EKG/troponin r/o CAD, Type and screen for possible transfusion, coags, ct head r/o intracranial bleed, CT cervical r/o fx, CT abd/pelvis r/o GI bleed, protonix, re-assess

## 2021-06-14 NOTE — H&P ADULT - NSICDXPASTSURGICALHX_GEN_ALL_CORE_FT
PAST SURGICAL HISTORY:  S/P cataract surgery left eye    Surgery, Elective left arm surgery as child x 2

## 2021-06-14 NOTE — ED ADULT NURSE REASSESSMENT NOTE - SKIN TURGOR
"[facility] Dermatology Note      Dermatology Problem List:  1. Hx of Melanoma: T1a, L upper back. S/p WLE 1999. NERD  2. SCC, right lower lip. S/p MMS 10/2013  3. Actinic chelitis - s/p efudex x14 days, resolved  4. AKs: LN2  5. Scalp folliculitis: Keto shampoo, clinda lotion PRN  6. Wart, right ring finger: Paring, LN2, sal acid/duct tape, Efudex QOD, cantharone, zinc sulfate 200mg BID  7. Onychomycosis - right 2nd digit - continue with penlac, resolving  8. NUB x 3 -L posterior auricular, R mid back, L mid back - s/p bx 12/17/20    CC:   Chief Complaint   Patient presents with     Skin Check     Personal hx of skin cancer. Chris has a few spots of concern today.     Encounter Date: Dec 17, 2020    History of Present Illness:  Mr. Jarrett Brown is a 77 year old male who presents for a melanoma skin check. Has a few spots of concern today. One on the side of his neck which is an \"irriated tag\", rubs on his shirt collar. Notes toenails are improving, was using penlac and a dremel tool to help them and he thinks they have improved.     Finished efudex on lip and the gritty sensation has resolved and not returned. He states he would put it on for about an hour and a time, put saran wrap between his lips which would prvent it from going in his mouth and this worked well for him. Notes lastly raised itchy spots on L forearm. No constitutional sx - no weight gain/loss, no fevers or night sweats or lumps or bumps. Otherwise well. Present with wife today.     Past Medical History:   Patient Active Problem List   Diagnosis     S/P TKR (total knee replacement)     Spermatocele     SCC (squamous cell carcinoma), face     Preventative health care     Bacterial folliculitis     Essential hypertension with goal blood pressure less than 140/90     Elevated serum glucose     Elevated LDL cholesterol level     Unstable angina (H)     Coronary artery disease involving native coronary artery of native heart     Benign " resilient/elastic prostatic hyperplasia with lower urinary tract symptoms, unspecified morphology     Malignant melanoma of skin of trunk, except scrotum (H)     Osteoarthrosis     Total knee replacement status, left     Past Medical History:   Diagnosis Date     Agent orange exposure     Had large exposure while in [place] in  work with lots of exposure to Agent Orange     BPH (benign prostatic hyperplasia)      Cataract      Chest pain 2016     Coronary artery disease involving native coronary artery of native heart 2016     Glaucoma     angle-closure / PXF     [place]'s disease      Malignant melanoma (H)      Malignant melanoma nos      Osteoarthritis      Raynaud phenomenon      Squamous cell carcinoma 2014    lip, MOHS procedure     Past Surgical History:   Procedure Laterality Date     ARTHROPLASTY KNEE  3/24/2011    ARTHROPLASTY KNEE performed by UCHE WHITEHEAD at  OR     ARTHROPLASTY REVISION KNEE      right     ARTHROSCOPY KNEE      left     ARTHROSCOPY SHOULDER      left     BIOPSY OF SKIN LESION       CATARACT IOL, RT/LT  12 & 12    LE / RE     HERNIORRHAPHY INGUINAL      right     HYDROCELECTOMY INGUINAL       LASER IRIDOTOMY OD (RIGHT EYE)  2009    RE LPI     LASER IRIDOTOMY OS (LEFT EYE)   09    LE LPI     LASER SELECTIVE TRABECULOPLASTY       MOHS MICROGRAPHIC PROCEDURE       NO HISTORY OF SURGERY  13    derm     Social History:  . Patient reports that he has never smoked. He has never used smokeless tobacco. He reports current alcohol use. He reports that he does not use drugs.     Family History:  There is no family history of skin cancer. There is no family history of melanoma.    Medications:  Current Outpatient Medications   Medication Sig Dispense Refill     acetaminophen (TYLENOL) 325 MG tablet Take 2 tablets (650 mg) by mouth every 4 hours as needed for other (mild pain) 100 tablet 0     albuterol (PROAIR HFA, PROVENTIL HFA, VENTOLIN HFA) 108 (90 BASE)  MCG/ACT inhaler Inhale 2 puffs into the lungs every 6 hours as needed for shortness of breath / dyspnea or wheezing 1 Inhaler 1     ascorbic acid (VITAMIN C) 500 MG tablet Take 500 mg by mouth every morning        atorvastatin (LIPITOR) 80 MG tablet Take 1 tablet (80 mg) by mouth every evening 90 tablet 0     cetirizine (ZYRTEC) 10 MG tablet Take 10 mg by mouth At Bedtime        ciclopirox (PENLAC) 8 % external solution Apply to adjacent skin and affected nails daily.  Remove with alcohol every 7 days, then repeat. 6 mL 11     clopidogrel (PLAVIX) 75 MG tablet Take 1 tablet (75 mg) by mouth daily 90 tablet 3     diphenhydrAMINE (BENADRYL) 25 MG capsule Take 50 mg by mouth as needed        lisinopril (ZESTRIL) 5 MG tablet Take 1 tablet (5 mg) by mouth daily 90 tablet 1     metoprolol succinate ER (TOPROL XL) 25 MG 24 hr tablet Take 1 tablet (25 mg) by mouth daily 90 tablet 3     Multiple Vitamins-Minerals (CENTRUM SILVER) per tablet Take 1 tablet by mouth every morning        Multiple Vitamins-Minerals (PRESERVISION AREDS 2) CAPS Take by mouth every morning       Omega-3 Fatty Acids (OMEGA-3 FISH OIL PO) Take 1,000 mg by mouth At Bedtime 1000 mg of wild [place] fish oil and 300 mg of Omega-3 Fatty Acids       tamsulosin (FLOMAX) 0.4 MG capsule Take 2 capsules (0.8 mg) by mouth daily at night 180 capsule 3     Allergies   Allergen Reactions     Pollen Extract Other (See Comments) and Unknown     Sulfa Drugs Hives and Rash     Review of Systems:  -Heme/Lymph: no concerning bumps, no bleeding or bruising problems   -Constitutional: The patient denies fatigue, fevers, chills, unintended weight loss, and night sweats.  -HEENT: Patient denies nonhealing oral sores.  -Skin: As above in HPI. No additional skin concerns.    Physical exam:  Vitals: There were no vitals taken for this visit.  GEN: This is a well developed, well-nourished male in no acute distress, in a pleasant mood.    SKIN: Full skin, which includes the  head/face, both arms, chest, back, abdomen,both legs, genitalia and/or groin buttocks, digits and/or nails, was examined.  -There is no erythema, telangectasias, nodularity, or pigmentation on the L upper back  -There are erythematous macules with overyling adherent scale on the bilateral temples.   -Méndez's skin type I  -L post auricular - 4mm hypertrophic papule  -R mid back - 6mm brown and pink irregular macule  -L mid back- 4mm pink shiny papule  LYMPH: Examination of the pre/post auricular, occipital, cervical, clavicular, axillary and inguinal lymph nodes was negative.  -No other lesions of concern on areas examined.     Impression/Plan:  1. History of melanoma, no clinical evidence of recurrence - T1a, L upper back. S/p WLE 1999.  -ABCDs of melanoma were discussed and self skin checks were advised.   -Sun precaution was advised including the use of sun screens of SPF 30 or higher, sun protective clothing, and avoidance of tanning beds.  -We will clinically monitor this area.  -continue with annual skin exams    2. Skin Cancer Screening/Multiple Benign Nevi  -ABCDs of melanoma were discussed and self skin checks were advised.   -Sun precaution was advised including the use of sun screens of SPF 30 or higher, sun protective clothing, and avoidance of tanning beds.  -continue with annual skin exams    3. Neoplasm of uncertain behavior on the L mid back. The differential diagnosis includes BCC vs other.   -Shave biopsy:  After discussion of benefits and risks including but not limited to bleeding/bruising, pain/swelling, infection, scar, incomplete removal, nerve damage/numbness, recurrence, and non-diagnostic biopsy, written consent, verbal consent and photographs were obtained. Time-out was performed. The area was cleaned with isopropyl alcohol. 0.5ml of 1% lidocaine with 1:100,000 epinephrine was injected to obtain adequate anesthesia. A shave biopsy was performed. Hemostasis was achieved with aluminium  chloride. Vaseline and a sterile dressing were applied. The patient tolerated the procedure and no complications were noted. The patient was provided with verbal and written post care instructions.  -Photograph was obtained for clinical monitoring and inclusion in medical record.    4. Neoplasm of uncertain behavior on the R mid back. The differential diagnosis includes pigmented BCC vs SK vs other.   -Shave biopsy:  After discussion of benefits and risks including but not limited to bleeding/bruising, pain/swelling, infection, scar, incomplete removal, nerve damage/numbness, recurrence, and non-diagnostic biopsy, written consent, verbal consent and photographs were obtained. Time-out was performed. The area was cleaned with isopropyl alcohol. 0.5ml of 1% lidocaine with 1:100,000 epinephrine was injected to obtain adequate anesthesia. A shave biopsy was performed. Hemostasis was achieved with aluminium chloride. Vaseline and a sterile dressing were applied. The patient tolerated the procedure and no complications were noted. The patient was provided with verbal and written post care instructions.  -Photograph was obtained for clinical monitoring and inclusion in medical record.  5.   6. Neoplasm of uncertain behavior on the L posterior auricular. The differential diagnosis includes HAK vs SCC vs oher.   -Shave biopsy:  After discussion of benefits and risks including but not limited to bleeding/bruising, pain/swelling, infection, scar, incomplete removal, nerve damage/numbness, recurrence, and non-diagnostic biopsy, written consent, verbal consent and photographs were obtained. Time-out was performed. The area was cleaned with isopropyl alcohol. 0.5ml of 1% lidocaine with 1:100,000 epinephrine was injected to obtain adequate anesthesia. A shave biopsy was performed. Hemostasis was achieved with aluminium chloride. Vaseline and a sterile dressing were applied. The patient tolerated the procedure and no complications  were noted. The patient was provided with verbal and written post care instructions.  -Photograph was obtained for clinical monitoring and inclusion in medical record.    7. Onychomycosis - improving, nearly resolved - continue current tx plan of Penlac daily, then remove with alcohol after 7 days    8. Actinic keratosis - temples, forearms bilaterally  -repeat efudex 5% cream BID to the temples and bilateral forearms for 14-21 bueno or until onset of irritation  -will recheck sites at next visit in 4-6mo    CC Dr. Stewart on close of this encounter.  Follow-up in 4-6 months, earlier for new or changing lesions.       Staff Involved:  Staff Only  All risks, benefits and alternatives were discussed with patient.  Patient is in agreement and understands the assessment and plan.  All questions were answered.    Isabella Strauss PA-C, MPAS  [facility] [place]: Phone: [phone], Fax: [phone]  [facility]: Phone: [phone],  Fax: [phone]

## 2021-06-14 NOTE — ED PROVIDER NOTE - PROGRESS NOTE DETAILS
Discussed with Glo (renal), reports Cr typically in low 4s. No acute intervention requested currently. ICU declined admission. discussed with hospitalist Dr. Israel accepting admission

## 2021-06-14 NOTE — ED PROVIDER NOTE - CARE PLAN
Principal Discharge DX:	Gastrointestinal hemorrhage, unspecified gastrointestinal hemorrhage type   Principal Discharge DX:	Gastrointestinal hemorrhage, unspecified gastrointestinal hemorrhage type  Secondary Diagnosis:	Anemia

## 2021-06-14 NOTE — ED PROVIDER NOTE - PHYSICAL EXAMINATION
Constitutional: Awake, Alert, non-toxic. NAD. Well appearing, well nourished.   HEAD: Normocephalic, atraumatic.   EYES: PERRL, EOM intact, conjunctiva and sclera are clear bilaterally. No raccoon eyes. (+)  right superior orbital ecchymosis.   ENT: No rhinorrhea, normal pharynx, patent, no tonsillar exudate or enlargement, mucous membranes pink/moist, no erythema, no drooling or stridor.   NECK: Supple, non-tender  BACK: No midline or paraspinal TTP of cervical/thoracic/lumbar spine, FROM. No ecchymosis or hematomas. no rib TTP.   CARDIOVASCULAR: Normal S1, S2; regular rate and rhythm.  RESPIRATORY: Normal respiratory effort; breath sounds CTAB, no wheezes, rhonchi, or rales. Speaking in full sentences. No accessory muscle use.   ABDOMEN: Soft; non-tender, non-distended.   RECTAL: (+) dried perirectal melena, no gross blood loss.   EXTREMITIES: Full passive and active ROM in all extremities; hips non-tender to palpation; distal pulses palpable and symmetric, no edema, no crepitus or step off  SKIN: Warm, dry; good skin turgor, no apparent lesions or rashes, no ecchymosis, brisk capillary refill.  NEURO: A&O x3. Sensory and motor functions are grossly intact. Speech is normal. Appearance and judgement seem appropriate for gender and age. No neurological deficits. Neurovascular sensation intact motor function 5/5 of upper and lower extremities, CN II-XII grossly intact, no ataxia, absent pronator drift, intact cerebellar function. Speech clear, without articulation or word-finding difficulties. Eyes- PERRL bilaterally. EOMs in tact. No nystagmus. No facial droop.

## 2021-06-14 NOTE — H&P ADULT - PROBLEM SELECTOR PLAN 2
Chronic normocytic anemia, in the setting of active GIB   -Hb 6.4 few couple of weeks ago   - H/H: 3.8/12.9   - s/p 2 pRBCs in ED   - Pt received IV infusion iron (last 04/2021) and paricalcitol 1mcg 3 times a week, vitamin B12 1000mg qd, magnesium 500mg qd   - F/up H/H posttransfusion Chronic normocytic anemia, in the setting of active GIB   -Hb 6.4 few couple of weeks ago   - H/H: 3.8/12.9   - s/p 2 pRBCs in ED   - Transfused for Hb< 7   - Pt received IV infusion iron (last 04/01/2021) and paricalcitol 1mcg 3 times a week, vitamin B12 1000mg qd, magnesium 500mg qd   - F/up H/H posttransfusion  - Trend CBC Acute on Chronic normocytic anemia, component of anemia of chronic disease now with acute blood loss anemia.   -Hb 6.4 few couple of weeks ago   - H/H: 3.8/12.9   - s/p 2 pRBCs in ED   - Transfused for Hb< 7   - Pt received IV infusion iron (last 04/01/2021) and paricalcitol 1mcg 3 times a week, vitamin B12 1000mg qd, magnesium 500mg qd   - F/up H/H posttransfusion then trend q 6 hours  - Trend CBC

## 2021-06-14 NOTE — H&P ADULT - PROBLEM SELECTOR PLAN 5
Chronic   - Takes Januvia 25mg qd   - hold oral hypoglycemic agents  -  HgbA1c  - Low  Insulin sliding scale  - Accu checks w/ hypoglycemic protocol Type 2 DM, Chronic   - Takes Januvia 25mg qd   - hold oral hypoglycemic agents  -  HgbA1c  - insulin coverage scale  - Accu checks w/ hypoglycemic protocol

## 2021-06-14 NOTE — H&P ADULT - PROBLEM SELECTOR PLAN 3
SHER on CKD Stage IV, creatinine baseline 4.  - Likely prerenal in the setting of active GIB   - On admission BUN/creatinine: 87/4.50   - Avoid nephrotoxic meds  - Nephro following Dr. Cody

## 2021-06-14 NOTE — CONSULT NOTE ADULT - SUBJECTIVE AND OBJECTIVE BOX
CHIEF COMPLAINT:    HPI:  86 yr old female with PMHx CKD5 (baseline sCr of 4), anemia of critical illness on procrit, s/p Fe infusion 4/1/21(baseline Hgb 7.4), DM2, HTN, HLD, IBS, osteoporosis, GERD who presented to E.D. via EMS this afternoon (6/14/21) with rectal bleeding accompanied by weakness, lightheadedness, dizziness. Pt endorsed developed loose black/dark red stools saturday (6/12/21) with 3 to 4 episodes over 24 hr period. Pt states fell off toilet saturday sustaining bruise over RT eye, denies LOC.       In E.D. found to have Hgb of 3.8, 's (baseline 's), sCr 4.5. Pt received CT head that showed no evidence of fx, ICH, mass effect and CT A/P that demonstrated no bowel obstruction, Colonic diverticula without acute diverticulitis. Pt received 1 liter NS IVF, protonix 40 mg IVP x1 and as of this writing due for PRBC. Vital signs at time of consult 106/53 HR 92 lying, 109/53 HR 96 sitting, without c/o dizziness lightheadedness Nausea, Chest pain or sob.     consult called for hypotension secondary to GIB      As pt with stable vital signs with improved symptoms, currently not candidate for ICU admission. please re consult if we can be of further assistance with this patient      PAST MEDICAL & SURGICAL HISTORY:  Hypertension    Hyperlipemia    Diabetes Mellitus Type II    GERD (Gastroesophageal Reflux Disease)    Irritable bowel syndrome with diarrhea    Orthostatic hypotension    Chronic kidney disease, unspecified CKD stage    Anemia    Rectal bleeding    Surgery, Elective  left arm surgery as child x 2    S/P cataract surgery  left eye        FAMILY HISTORY:  No pertinent family history in first degree relatives        SOCIAL HISTORY:  Smoking: [ ] Never Smoked [ ] Former Smoker (__ packs x ___ years) [ ] Current Smoker  (__ packs x ___ years)  Substance Use: [ ] Never Used [ ] Used ____  EtOH Use:  Marital Status: [ ] Single [ ]  [ ]  [ ]   Sexual History:   Occupation:  Recent Travel:  Country of Birth:  Advance Directives:    Allergies    No Known Allergies    Intolerances        HOME MEDICATIONS:    REVIEW OF SYSTEMS:  General:  No wt loss, fevers, chills, night sweats, fatigue,   Eyes:  Good vision, no reported pain  ENT:  No sore throat, pain, runny nose, dysphagia  CV:  No pain, palpitations, hypo/hypertension  Resp:  No dyspnea, cough, tachypnea, wheezing  GI:  See HPI  :  No pain, bleeding, incontinence, nocturia  Muscle:  No pain, +weakness  Neuro:  No weakness, tingling, memory problems  Psych:  No fatigue, insomnia, mood problems, depression  Endocrine:  No polyuria, polydipsia, cold/heat intolerance  Heme:  No petechiae, ecchymosis, easy bruisability  Skin:  No rash, tattoos, scars, edema              OBJECTIVE:  ICU Vital Signs Last 24 Hrs  T(C): 36.8 (14 Jun 2021 16:52), Max: 36.8 (14 Jun 2021 16:52)  T(F): 98.3 (14 Jun 2021 16:52), Max: 98.3 (14 Jun 2021 16:52)  HR: 79 (14 Jun 2021 16:52) (79 - 92)  BP: 100/51 (14 Jun 2021 16:52) (100/51 - 122/69)  BP(mean): --  ABP: --  ABP(mean): --  RR: 16 (14 Jun 2021 16:52) (14 - 16)  SpO2: 100% (14 Jun 2021 16:52) (96% - 100%)        CAPILLARY BLOOD GLUCOSE          PHYSICAL EXAM:  GENERAL:  Appears stated age, well-groomed, well-nourished, no distress  HEENT:  NC/AT,  conjunctivae clear and pink, no thyromegaly, nodules, adenopathy, no JVD, sclera -anicteric  CHEST:  Full & symmetric excursion, no increased effort, breath sounds clear  HEART:  Regular rhythm, S1, S2, without murmur/rub/S3/S4, no abdominal bruit, no edema  ABDOMEN:  Soft, non-tender, non-distended, normoactive bowel sounds  EXT:  no cyanosis, clubbing or edema  SKIN:  No rash/erythema, intact   NEURO:  Alert, oriented, no asterixis, no tremor, no encephalopathy          HOSPITAL MEDICATIONS:  MEDICATIONS  (STANDING):  pantoprazole Infusion 8 mG/Hr (10 mL/Hr) IV Continuous <Continuous>    MEDICATIONS  (PRN):      LABS:                        3.8    12.74 )-----------( 345      ( 14 Jun 2021 13:48 )             12.9     Hgb Trend: 3.8<--  06-14    139  |  108  |  87<H>  ----------------------------<  200<H>  4.6   |  22  |  4.50<H>    Ca    8.0<L>      14 Jun 2021 13:48    TPro  5.2<L>  /  Alb  2.6<L>  /  TBili  0.3  /  DBili  x   /  AST  11<L>  /  ALT  8<L>  /  AlkPhos  29<L>  06-14    Creatinine Trend: 4.50<--  PT/INR - ( 14 Jun 2021 13:48 )   PT: 12.3 sec;   INR: 1.05 ratio         PTT - ( 14 Jun 2021 13:48 )  PTT:23.6 sec          MICROBIOLOGY:     RADIOLOGY:  [ ] Reviewed and interpreted by me    EKG:        Eva ANP-BC (ext. [id number])

## 2021-06-14 NOTE — H&P ADULT - NSHPREVIEWOFSYSTEMS_GEN_ALL_CORE
CONSTITUTIONAL: denies fever, chills, admits fatigue and weakness  HEENT: denies blurred vision, sore throat  SKIN: denies new lesions, rash  CARDIOVASCULAR: denies chest pain, chest pressure, palpitations  RESPIRATORY: denies shortness of breath, sputum production  GASTROINTESTINAL: denies nausea, vomiting, abdominal pain, admits bloody watery diarrhea 5 episodes since Saturday.   GENITOURINARY: denies dysuria, discharge  NEUROLOGICAL: denies numbness, headache, focal weakness  MUSCULOSKELETAL: denies new joint pain, muscle aches  HEMATOLOGIC: denies gross bleeding, bruising  LYMPHATICS: denies enlarged lymph nodes, extremity swelling  PSYCHIATRIC: denies recent changes in anxiety, depression  ENDOCRINOLOGIC: denies sweating, cold or heat intolerance

## 2021-06-14 NOTE — CONSULT NOTE ADULT - ASSESSMENT
87 y/o female with CKD, anemia, HTN, HLD BIBA from home due to rectal bleeding/melena/hematochezia found to have hgb 3.9.    Rec:  - needs medical optimization with transfusion   - goal hgb >7-8  - PPI BID  - NM bleeding scan to localize bleeding  - pt will need endoscopic evaluation once medically optimized  - NPO    Adwoa Barnes [place] [address]  [phone]    ACP (advance care planning). Plan: Advanced care planning was discussed with patient and family.  Advanced care planning forms were reviewed and discussed.  Risks, benefits and alternatives of gastroenterologic procedures were discussed in detail and all questions were answered.    30 minutes spent.         87 y/o female with CKD, anemia, HTN, HLD BIBA from home due to rectal bleeding/melena/hematochezia found to have hgb 3.9.    Rec:  - needs medical optimization with transfusion   - goal hgb >7-8  - PPI BID  - NM bleeding scan to localize bleeding- given history of possible SB bleed  - pt will need endoscopic evaluation once medically optimized  - NPO with ice chips/water only    Adwoa Barnes MD  Gastroenterology  [address] [address] [phone]    ACP (advance care planning). Plan: Advanced care planning was discussed with patient and family.  Advanced care planning forms were reviewed and discussed.  Risks, benefits and alternatives of gastroenterologic procedures were discussed in detail and all questions were answered.    30 minutes spent.

## 2021-06-14 NOTE — ED PROVIDER NOTE - OBJECTIVE STATEMENT
85 y/o female with PMHX CKD, anemia, HTN, HLD BIBA from home due to rectal bleeding. Pt reports having bright red blood and melena with each bowel movement since Saturday. pt reports feeling lightheaded since Saturday. pt reports she has hx of Anemia and recieves Procrit, notes last Hgb was 7.4. pt reports she fell Saturday in which she hit her head. pt notes bruising to right forehead. pt admits to diarrhea. pt denies chest pain, SOB, blood thinner use, fever, vomiting, abd pain, or any other complaints.

## 2021-06-14 NOTE — H&P ADULT - NSHPSOCIALHISTORY_GEN_ALL_CORE
Tobacco: former smoker 2 PPD/ 40 years, quit 30 years ago   EtOH: denies  Recreational drug use: denies   Lives at home   Ambulates: independent   ADLs: independent   Occupation: retired   Vaccinations: Full vaccinated Flu, PNA, COVID-19. Moderna (01/2021)

## 2021-06-14 NOTE — H&P ADULT - PROBLEM SELECTOR PLAN 6
Chronic stable   - Soft BP on ED  in the setting of active GIB   - Takes at home amlodipine 5mg qd  - c/w home meds w/ holding parameters  - monitor BP & titrate BP meds PRN

## 2021-06-14 NOTE — H&P ADULT - NSHPPHYSICALEXAM_GEN_ALL_CORE
T(C): 36.8 (06-14-21 @ 16:52), Max: 36.8 (06-14-21 @ 16:52)  HR: 79 (06-14-21 @ 16:52) (79 - 92)  BP: 100/51 (06-14-21 @ 16:52) (100/51 - 122/69)  RR: 16 (06-14-21 @ 16:52) (14 - 16)  SpO2: 100% (06-14-21 @ 16:52) (96% - 100%)    GENERAL: patient appears weak, no acute distress, appropriate, pleasant  EYES: sclera clear, no exudates  ENMT: oropharynx clear without erythema, no exudates, moist mucous membranes  NECK: supple, soft, no thyromegaly noted  LUNGS: good air entry bilaterally, clear to auscultation, symmetric breath sounds, no wheezing or rhonchi appreciated  HEART: soft S1/S2, regular rate and rhythm, systolic murmur noted, no lower extremity edema  GASTROINTESTINAL: abdomen is soft, nontender, nondistended, normoactive bowel sounds, no palpable masses  INTEGUMENT: pale, no lesions noted  MUSCULOSKELETAL: no clubbing or cyanosis, no obvious deformity  NEUROLOGIC: awake, alert, oriented x3, no obvious sensory deficits  PSYCHIATRIC: mood is good, affect is congruent, linear and logical thought process T(C): 36.8 (06-14-21 @ 16:52), Max: 36.8 (06-14-21 @ 16:52)  HR: 79 (06-14-21 @ 16:52) (79 - 92)  BP: 100/51 (06-14-21 @ 16:52) (100/51 - 122/69)  RR: 16 (06-14-21 @ 16:52) (14 - 16)  SpO2: 100% (06-14-21 @ 16:52) (96% - 100%)    GENERAL: patient appears weak, no acute distress, appropriate, pleasant  ENMT: oropharynx clear without erythema, no exudates, moist mucous membranes  NECK: supple, soft, no thyromegaly noted  LUNGS: good air entry bilaterally, clear to auscultation, symmetric breath sounds, no wheezing or rhonchi appreciated  HEART: soft S1/S2, regular rate and rhythm, systolic murmur noted, no lower extremity edema  GASTROINTESTINAL: abdomen is soft, nontender, nondistended, normoactive bowel sounds, no palpable masses  INTEGUMENT: pale skin,  no lesions noted  MUSCULOSKELETAL: no clubbing or cyanosis, no obvious deformity  NEUROLOGIC: awake, alert, oriented x3, no obvious sensory deficits. decreased muscle strength in LE   PSYCHIATRIC: mood is good, affect is congruent, linear and logical thought process

## 2021-06-14 NOTE — ED ADULT NURSE REASSESSMENT NOTE - NS ED NURSE REASSESS COMMENT FT1
1958H PRBC completed. no BT reaction noted. patient noted to be lesser pale now. vital sign stable. patient denies pain or any form of discomfort.
2nd unit PRBC started with JM FARMER time out completed. consent verified. vital sign taken and recorded. will continue to monitor for transfusion reaction.
19:10H hand off received from Rosalva ONEAL. patient alert and oriented x 4. afebrile. with PRBC 1st unit infusing well. vital sign stable and recorded. non-labored respiration noted. denies chest pain. will continue to monitor.

## 2021-06-14 NOTE — CONSULT NOTE ADULT - SUBJECTIVE AND OBJECTIVE BOX
HPI;    Patient is a 86y old  F with hx of nephrosclerotic CKD4, baseline Cr 4, anemia of advanced CKD who presented to ED this afternoon c/o dark stool. No prior hx.   Had a syncopal episode on 6/12 when getting off toilet. Unclear down time. Hgb 3.8 in ED, BUN/Cr 87/4.5. Receiving blood. Denies abdominal pain. No reported hx of wt loss.   Of note, has been receiving Procrit 40779 Units every 4-6 weeks for few months with an appropriate response. Hgb was >10 3-4 weeks ago.         PAST MEDICAL & SURGICAL HISTORY:  Hypertension    Hyperlipemia    Diabetes Mellitus Type II    GERD (Gastroesophageal Reflux Disease)    Irritable bowel syndrome with diarrhea    Orthostatic hypotension    Chronic kidney disease, unspecified CKD stage    Anemia    Rectal bleeding    Surgery, Elective  left arm surgery as child x 2    S/P cataract surgery  left eye        Social Hx: not a smoker    FAMILY HISTORY:  No pertinent family history in first degree relatives        Allergies    No Known Allergies    Intolerances        MEDICATIONS  (STANDING):  pantoprazole Infusion 8 mG/Hr (10 mL/Hr) IV Continuous <Continuous>    MEDICATIONS  (PRN):      Daily Height in cm: 149.86 (14 Jun 2021 12:30)    Daily     Drug Dosing Weight  Height (cm): 149.9 (14 Jun 2021 12:30)  Weight (kg): 56.7 (14 Jun 2021 12:30)  BMI (kg/m2): 25.2 (14 Jun 2021 12:30)  BSA (m2): 1.51 (14 Jun 2021 12:30)      REVIEW OF SYSTEMS:    Syncope  Melena  CKD4  Anemia of CKD            I&O's Detail        PHYSICAL EXAM:    GENERAL: NAD  HEAD:  R periorbital ecchymosis.   ENMT: moist mucous membranes.   NECK: Supple. No increase in JVP  CHEST/LUNG: Clear to auscultation bilaterally  HEART: Regular rate and rhythm. No murmurs, rubs, or gallops  ABDOMEN: Soft, Nontender, Nondistended. POS BS  EXTREMITIES:  non-pitting edema    LABS:  CBC Full  -  ( 14 Jun 2021 13:48 )  WBC Count : 12.74 K/uL  RBC Count : 1.35 M/uL  Hemoglobin : 3.8 g/dL  Hematocrit : 12.9 %  Platelet Count - Automated : 345 K/uL  Mean Cell Volume : 95.6 fl  Mean Cell Hemoglobin : 28.1 pg  Mean Cell Hemoglobin Concentration : 29.5 gm/dL  Auto Neutrophil # : 10.15 K/uL  Auto Lymphocyte # : 1.21 K/uL  Auto Monocyte # : 0.60 K/uL  Auto Eosinophil # : 0.19 K/uL  Auto Basophil # : 0.02 K/uL  Auto Neutrophil % : 79.6 %  Auto Lymphocyte % : 9.5 %  Auto Monocyte % : 4.7 %  Auto Eosinophil % : 1.5 %  Auto Basophil % : 0.2 %    06-14    139  |  108  |  87<H>  ----------------------------<  200<H>  4.6   |  22  |  4.50<H>    Ca    8.0<L>      14 Jun 2021 13:48    TPro  5.2<L>  /  Alb  2.6<L>  /  TBili  0.3  /  DBili  x   /  AST  11<L>  /  ALT  8<L>  /  AlkPhos  29<L>  06-14    CAPILLARY BLOOD GLUCOSE        PT/INR - ( 14 Jun 2021 13:48 )   PT: 12.3 sec;   INR: 1.05 ratio         PTT - ( 14 Jun 2021 13:48 )  PTT:23.6 sec    CARDIAC MARKERS ( 14 Jun 2021 13:48 )  .533 ng/mL / x     / x     / x     / x              Impression:  * SHER -- pre-renal azotemia and/or ATN ( syncope ).   * CKD4. 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 Baseline Cr ~4  * Acute on chronic anemia due to GI blood loss. Suspect upper. ? AVM ( chronic renal dz)  * Syncope    Recommendations:   * Follow response to blood products.   * D5 0.45% saline at 40cc/h while NPO  * Avoid hypotension  * Will reeval in am  * GI eval noted.     Thank you!

## 2021-06-14 NOTE — H&P ADULT - ASSESSMENT
·	85 y/o female with PMHx T2DM, CKD, anemia, HTN, HLD BIBA from home due to rectal bleeding. Pt reports having  hematochezia and melena with each bowel movement since Saturday. Hb on admission 3.4 received 2 pRBCs in ED. Admitted for active GIB, plan for EGD/colonscopy.

## 2021-06-14 NOTE — H&P ADULT - NSICDXFAMILYHX_GEN_ALL_CORE_FT
FAMILY HISTORY:  Father  Still living? No  FH: type 2 diabetes, Age at diagnosis: Age Unknown    Mother  Still living? Unknown  Family history of CVA, Age at diagnosis: Age Unknown    Sibling  Still living? Yes, Estimated age: Age Unknown  Family history of CVA, Age at diagnosis: Age Unknown

## 2021-06-14 NOTE — ED ADULT NURSE NOTE - NSIMPLEMENTINTERV_GEN_ALL_ED
Implemented All Fall with Harm Risk Interventions:  [place] to call system. Call bell, personal items and telephone within reach. Instruct patient to call for assistance. Room bathroom lighting operational. Non-slip footwear when patient is off stretcher. Physically safe environment: no spills, clutter or unnecessary equipment. Stretcher in lowest position, wheels locked, appropriate side rails in place. Provide visual cue, wrist band, yellow gown, etc. Monitor gait and stability. Monitor for mental status changes and reorient to person, place, and time. Review medications for side effects contributing to fall risk. Reinforce activity limits and safety measures with patient and family. Provide visual clues: red socks.

## 2021-06-14 NOTE — H&P ADULT - HISTORY OF PRESENT ILLNESS
87 y/o female with PMHX CKD, anemia, HTN, HLD BIBA from home due to rectal bleeding. Pt reports having bright red blood and melena with each bowel movement since Saturday. pt reports feeling lightheaded since Saturday. pt reports she has hx of Anemia and receives Procrit, notes last Hgb was 7.4. pt reports she fell Saturday in which she hit her head. pt notes bruising to right forehead. pt admits to diarrhea. pt denies chest pain, SOB, blood thinner use, fever, vomiting, abd pain, or any other complaints      ED course   VS:   Temp: 97.5, HR:92, BP: 122/69->107/52, RR: 16, SpO2: 99% RA  Labs significant for FOBT positive, WBC: 12.74 H/H: 3.8/12.9 BUN/creatinine: 87/4.50 AST/ALT: 11/8 GFR: 10 Troponin: 0.533   CT abdomen/pelvis shows: No specific acute inflammatory or obstructive pathology. Diverticulosis coli without acute diverticulitis.  CT head/cervical shows: There is no evidence of skull fracture, intracranial hemorrhage or mass effect. There is no cervical spine fracture.  Patient received pantoprazole 40mg IVP 1x, Protonix drip, NS bolus 1lt 1x and  2 pRBCs      87 y/o female with PMHx T2DM, CKD, anemia, HTN, HLD BIBA from home due to rectal bleeding. Pt reports having bright red blood and melena with each bowel movement since Saturday.  Reports feeling severe lightheaded, Saturday overnight went to bathroom and tried to stand up, lost strength in LE and fell, hit head w/o LOC. Denies presyncopal episodes as nausea,  vomiting, chest pain, palpitations. She has been having bloody watery diarrhea since Saturday  5-6 episodes, it's the first episode, last colonoscopy more than 10 years ago found to have diverticulosis  Pt has history of chronic anemia, receives Procrit and IV iron last transfusion was 04/2021, last Hgb was 7.4. few weeks ago.  Denies nausea, vomiting, chest pain, SOB, fever, chills, abdominal pain     ED course   VS:   Temp: 97.5, HR:92, BP: 122/69->107/52, RR: 16, SpO2: 99% RA  Labs significant for FOBT positive, WBC: 12.74 H/H: 3.8/12.9 BUN/creatinine: 87/4.50 AST/ALT: 11/8 GFR: 10 Troponin: 0.533   CT abdomen/pelvis shows: No specific acute inflammatory or obstructive pathology. Diverticulosis coli without acute diverticulitis.  CT head/cervical shows: There is no evidence of skull fracture, intracranial hemorrhage or mass effect. There is no cervical spine fracture.  Patient received pantoprazole 40mg IVP 1x, Protonix drip, NS bolus 1lt 1x and  2 pRBCs      85 y/o female with PMHx T2DM, CKD, anemia, HTN, HLD BIBA from home due to rectal bleeding. Pt reports having bright red blood and melena with each bowel movement since Saturday.  Reports feeling severe lightheaded, Saturday overnight went to bathroom and tried to stand up, lost strength in LE and fell, hit head with bruised over R eye w/o LOC. Denies presyncopal episodes as nausea,  vomiting, chest pain, palpitations. She has been having bloody watery diarrhea since Saturday  5-6 episodes, it's the first episode, last colonoscopy more than 10 years ago found to have diverticulosis  Pt has history of chronic anemia, receives Procrit and IV iron last transfusion was 04/2021, last Hgb was 7.4. few weeks ago.  Denies nausea, vomiting, chest pain, SOB, fever, chills, abdominal pain, hematuria, dysuria or other symptoms at this time.     ED course   VS: Temp: 97.5, HR:92, BP: 122/69->107/52, RR: 16, SpO2: 99% RA  Labs significant for FOBT positive, WBC: 12.74 H/H: 3.8/12.9 BUN/creatinine: 87/4.50 AST/ALT: 11/8 GFR: 10 Troponin: 0.533   CT abdomen/pelvis shows: No specific acute inflammatory or obstructive pathology. Diverticulosis coli without acute diverticulitis.  CT head/cervical shows: There is no evidence of skull fracture, intracranial hemorrhage or mass effect. There is no cervical spine fracture.  Patient received pantoprazole 40mg IVP 1x, Protonix drip, NS bolus 1lt 1x and  2 pRBCs. GI consulted      85 y/o female with PMHx T2DM, CKD, anemia, HTN, HLD BIBA from home due to rectal bleeding. Pt reports having bright red blood and melena with each bowel movement since Saturday.  Reports feeling severe lightheaded, Saturday overnight went to bathroom and tried to stand up, lost strength in LE and fell, hit head with bruised over R eye w/o LOC. Denies presyncopal episodes as nausea,  vomiting, chest pain, palpitations. She has been having bloody watery diarrhea since Saturday  5-6 episodes, it's the first episode, last colonoscopy more than 10 years ago found to have diverticulosis  Pt has history of chronic anemia, receives Procrit and IV iron last transfusion was 04/2021, last Hgb was 7.4. few weeks ago.  Denies nausea, vomiting, chest pain, SOB, fever, chills, abdominal pain, hematuria, dysuria or other symptoms at this time.     ED course   VS: Temp: 97.5, HR:92, BP: 122/69->107/52, RR: 16, SpO2: 99% RA  Labs significant for FOBT positive, WBC: 12.74 H/H: 3.8/12.9 BUN/creatinine: 87/4.50 AST/ALT: 11/8 GFR: 10 Troponin: 0.533   CT abdomen/pelvis shows: No specific acute inflammatory or obstructive pathology. Diverticulosis coli without acute diverticulitis.  CT head/cervical shows: There is no evidence of skull fracture, intracranial hemorrhage or mass effect. There is no cervical spine fracture.  EKG shows NSR @ 82 bpm left axis deviation  RBBB. QTc 509  Patient received pantoprazole 40mg IVP 1x, Protonix drip, NS bolus 1lt 1x and  2 pRBCs. GI  and nephro consulted      87 y/o female with PMHx T2DM, CKD, anemia, HTN, HLD, insomnia BIBA from home due blood in the stools associated with lightheadedness.  Patient reports having bright red blood and black stools with each bowel movement since Saturday (06/12/21).  Endorsed feeling severe lightheaded, Saturday overnight went to bathroom and tried to stand up, lost strength in LE and fell, hit head with bruised over R eye w/o LOC. Denies presyncopal episodes as nausea,  vomiting, chest pain, palpitations. She has been having bloody watery diarrhea since Saturday  5-6 episodes, it's the first episode, last colonoscopy more than 10 years ago found to have diverticulosis  Pt has history of chronic anemia, receives Procrit and IV iron last transfusion was 04/2021, last Hgb was 7.4. few weeks ago.  Denies nausea, vomiting, chest pain, SOB, fever, chills, abdominal pain, hematuria, dysuria or other symptoms at this time.     ED course   VS: Temp: 97.5, HR:92, BP: 122/69->107/52, RR: 16, SpO2: 99% RA  Labs significant for FOBT positive, WBC: 12.74 H/H: 3.8/12.9 BUN/creatinine: 87/4.50 AST/ALT: 11/8 GFR: 10 Troponin: 0.533   CT abdomen/pelvis shows: No specific acute inflammatory or obstructive pathology. Diverticulosis coli without acute diverticulitis.  CT head/cervical shows: There is no evidence of skull fracture, intracranial hemorrhage or mass effect. There is no cervical spine fracture.  EKG shows NSR @ 82 bpm left axis deviation  RBBB. QTc 509  Patient received pantoprazole 40mg IVP 1x, Protonix drip, NS bolus 1lt 1x and  2 pRBCs. GI  and nephro consulted      85 y/o female with PMHx T2DM, CKD stage IV, chronic anemia, HTN, HLD, insomnia, diverticulosis. BIBEMS from home due blood in the stools associated with lightheadedness.  Patient reports having bright red blood with black stools with each bowel movement since Saturday (06/12/21).  Endorsed feeling severe lightheaded, on Saturday overnight went to bathroom and tried to stand up, lost strength in LE and fell, hit head with bruised over R eye w/o LOC. Denies presyncopal episodes as nausea,  vomiting, chest pain, palpitations. She has been having bloody watery diarrhea w/ melena since Saturday  5-6 episodes, it's the first episode, last colonoscopy more than 10 years ago found to have diverticulosis  Pt has history of chronic anemia, receives Procrit and IV iron last transfusion was 04/2021, last Hgb was 7.4. few weeks ago.  Denies nausea, vomiting, chest pain, SOB, fever, chills, abdominal pain, hematuria, dysuria or other symptoms at this time.     ED course   VS: Temp: 97.5, HR:92, BP: 122/69->107/52, RR: 16, SpO2: 99% RA  Labs significant for FOBT positive, WBC: 12.74 H/H: 3.8/12.9 BUN/creatinine: 87/4.50 AST/ALT: 11/8 GFR: 10 Troponin: 0.533   CT abdomen/pelvis shows: No specific acute inflammatory or obstructive pathology. Diverticulosis coli without acute diverticulitis.  CT head/cervical shows: There is no evidence of skull fracture, intracranial hemorrhage or mass effect. There is no cervical spine fracture.  EKG shows NSR @ 82 bpm left axis deviation  RBBB. QTc 509  Patient received pantoprazole 40mg IVP 1x, Protonix drip, NS bolus 1lt 1x and  2 pRBCs. GI  and nephro consulted      87 y/o female with PMHx T2DM, CKD stage IV, chronic anemia, HTN, HLD, insomnia, diverticulosis. BIBEMS from home due blood in the stools associated with lightheadedness.  Patient reports having bright red blood with black stools with each bowel movement since Saturday (06/12/21) approximately 5 episodes.  Endorsed feeling severe lightheaded, on Saturday overnight went to bathroom and tried to stand up, lost strength in LE and fell, hit head with bruised over R eye w/o LOC. Denies presyncopal episodes as nausea,  vomiting, chest pain, palpitations.  Pt has history of chronic anemia, receives Procrit and IV iron last transfusion was 04/2021, last Hgb was 7.4. few weeks ago. Last colonoscopy 10 years ago found to have diverticulosis.   Denies nausea, vomiting, chest pain, SOB, fever, chills, abdominal pain, hematuria, dysuria or other symptoms at this time.     ED course   VS: Temp: 97.5, HR:92, BP: 122/69->107/52, RR: 16, SpO2: 99% RA  Labs significant for FOBT positive, WBC: 12.74 H/H: 3.8/12.9 BUN/creatinine: 87/4.50 AST/ALT: 11/8 GFR: 10 Troponin: 0.533   CT abdomen/pelvis shows: No specific acute inflammatory or obstructive pathology. Diverticulosis coli without acute diverticulitis.  CT head/cervical shows: There is no evidence of skull fracture, intracranial hemorrhage or mass effect. There is no cervical spine fracture.  EKG shows NSR @ 82 bpm left axis deviation  RBBB. QTc 509  Patient received pantoprazole 40mg IVP 1x, Protonix drip, NS bolus 1lt 1x and  2 pRBCs. GI  and nephro consulted      85 y/o female with PMHx T2DM, CKD stage IV, chronic anemia, HTN, HLD, insomnia, diverticulosis. BIBEMS from home due blood in the stools associated with lightheadedness.  Patient reports having bright red blood with black stools with each bowel movement since Saturday (06/12/21) approximately 5 episodes.  Endorsed feeling severe lightheaded, on Saturday overnight went to bathroom and tried to stand up, lost strength in LE and fell, hit head with bruised over R eye w/o LOC. Denies syncope, nausea,  vomiting, chest pain, palpitations.  Pt has history of chronic anemia, receives Procrit and IV iron last transfusion was 04/2021, last Hgb was 6.4. few weeks ago. Last colonoscopy 10 years ago found to have diverticulosis.   Denies nausea, vomiting, chest pain, SOB, fever, chills, abdominal pain, hematuria, dysuria or other symptoms at this time.     ED course   VS: Temp: 97.5, HR:92, BP: 122/69->107/52, RR: 16, SpO2: 99% RA  Labs significant for FOBT positive, WBC: 12.74 H/H: 3.8/12.9 BUN/creatinine: 87/4.50 AST/ALT: 11/8 GFR: 10 Troponin: 0.533   CT abdomen/pelvis shows: No specific acute inflammatory or obstructive pathology. Diverticulosis coli without acute diverticulitis.  CT head/cervical shows: There is no evidence of skull fracture, intracranial hemorrhage or mass effect. There is no cervical spine fracture.  EKG shows NSR @ 82 bpm left axis deviation  RBBB. QTc 509  Patient received pantoprazole 40mg IVP 1x, Protonix drip, NS bolus 1lt 1x and  2 pRBCs. GI  and nephro consulted

## 2021-06-14 NOTE — H&P ADULT - PROBLEM SELECTOR PLAN 9
DVT ppx: improve score 1. Holding AC pharmacologic in the setting of active GIB. SCDs     IMPROVE VTE Individual Risk Assessment  RISK                                                                Points  [  ] Previous VTE                                                  3  [  ] Thrombophilia                                               2  [  ] Lower limb paralysis                                      2        (unable to hold up >15 seconds)    [  ] Current Cancer                                              2         (within 6 months)  [  ] Immobilization > 24 hrs                                1  [  ] ICU/CCU stay > 24 hours                              1  [ x ] Age > 60                                                      1  IMPROVE VTE Score ______1___ DVT ppx: improve score 1. Holding AC pharmacologic in the setting of active GIB. SCDs   Fall precautions     IMPROVE VTE Individual Risk Assessment  RISK                                                                Points  [  ] Previous VTE                                                  3  [  ] Thrombophilia                                               2  [  ] Lower limb paralysis                                      2        (unable to hold up >15 seconds)    [  ] Current Cancer                                              2         (within 6 months)  [  ] Immobilization > 24 hrs                                1  [  ] ICU/CCU stay > 24 hours                              1  [ x ] Age > 60                                                      1  IMPROVE VTE Score ______1___

## 2021-06-14 NOTE — CONSULT NOTE ADULT - ATTENDING COMMENTS
86F PMH CKD (baseline Cr 4), anemia of CKD on Procrit (last HGB 7.4), HTN, and HLD who presents with acute blood loss anemia due to acute upper GI bleed. She initially started to experience melanotic stools 2 days prior to admission with episode of light-headedness and dizziness leading to fall and head injury with R frontal hematoma, denies loss of consciousness. Melena improved yesterday, but today patient remained weak and light-headed.    In the ED, she was found to have HGB 3.8 with low normal BP 100s and acute on chronic renal failure. CT head with no acute hemorrhage, mass, or infarct. CT A/P without acute obstruction or acute inflammatory process with noted diverticulosis without diverticulitis.     She received NS 1 liter, pantoprazole 40 mg IV followed by gtt, and ordered for 2 units prbc's. She is afebrile and hemodynamically stable. She is awake and alert, cognitively intact.    At this time, she does not require ICU care. Continue transfusion with prbc's for goal HGB>7. Hold aspirin. Continue PPI gtt. GI eval for possible endoscopy. Please call back with questions.

## 2021-06-14 NOTE — CONSULT NOTE ADULT - SUBJECTIVE AND OBJECTIVE BOX
[facility]    MD Larry Rogers MD Jaydeep Kadam, MD Faisal Sheikh, DO Kris Montoya, GRANT Blanchard      Chief Complaint:  Patient is a 86y old  Female who presents with a chief complaint of     HPI:    87 y/o female with PMHX CKD, anemia, HTN, HLD BIBA from home due to rectal bleeding. Pt reports having bright red blood and melena with each bowel movement since Saturday. pt reports feeling lightheaded since Saturday. pt reports she has hx of Anemia and recieves Procrit, notes last Hgb was 7.4. pt reports she fell Saturday in which she hit her head. pt notes bruising to right forehead. pt admits to diarrhea. pt denies chest pain, SOB, blood thinner use, fever, vomiting, abd pain, or any other complaints.      Allergies:  No Known Allergies      Home Medications:    Hospital Medications:  pantoprazole Infusion 8 mG/Hr IV Continuous <Continuous>      PMHX/PSHX:  Hypertension    Hyperlipemia    Diabetes Mellitus Type II    GERD (Gastroesophageal Reflux Disease)    Lung Cancer    Irritable bowel syndrome with diarrhea    Orthostatic hypotension    Chronic kidney disease, unspecified CKD stage    Anemia    Rectal bleeding    Hypertension    Surgery, Elective    S/P cataract surgery        Family history:  No pertinent family history in first degree relatives        Social History:     ROS:     General:  No wt loss, fevers, chills, night sweats, fatigue,   Eyes:  Good vision, no reported pain  ENT:  No sore throat, pain, runny nose, dysphagia  CV:  No pain, palpitations, hypo/hypertension  Resp:  No dyspnea, cough, tachypnea, wheezing  GI:  See HPI  :  No pain, bleeding, incontinence, nocturia  Muscle:  No pain, +weakness  Neuro:  No weakness, tingling, memory problems  Psych:  No fatigue, insomnia, mood problems, depression  Endocrine:  No polyuria, polydipsia, cold/heat intolerance  Heme:  No petechiae, ecchymosis, easy bruisability  Skin:  No rash, tattoos, scars, edema      PHYSICAL EXAM:     GENERAL:  Appears stated age, well-groomed, well-nourished, no distress  HEENT:  NC/AT,  conjunctivae clear and pink, no thyromegaly, nodules, adenopathy, no JVD, sclera -anicteric  CHEST:  Full & symmetric excursion, no increased effort, breath sounds clear  HEART:  Regular rhythm, S1, S2, no murmur/rub/S3/S4, no abdominal bruit, no edema  ABDOMEN:  Soft, non-tender, non-distended, normoactive bowel sounds  EXT:  no cyanosis, clubbing or edema  SKIN:  No rash/erythema, intact   NEURO:  Alert, oriented, no asterixis, no tremor, no encephalopathy    Vital Signs:  Vital Signs Last 24 Hrs  T(C): 36.4 (14 Jun 2021 12:30), Max: 36.4 (14 Jun 2021 12:30)  T(F): 97.5 (14 Jun 2021 12:30), Max: 97.5 (14 Jun 2021 12:30)  HR: 79 (14 Jun 2021 14:55) (79 - 92)  BP: 107/52 (14 Jun 2021 14:55) (107/52 - 122/69)  BP(mean): --  RR: 14 (14 Jun 2021 14:55) (14 - 16)  SpO2: 99% (14 Jun 2021 14:55) (96% - 99%)  Daily Height in cm: 149.86 (14 Jun 2021 12:30)    Daily     LABS:                        3.8    12.74 )-----------( 345      ( 14 Jun 2021 13:48 )             12.9     Mean Cell Volume: 95.6 fl (06-14-21 @ 13:48)    06-14    139  |  108  |  87<H>  ----------------------------<  200<H>  4.6   |  22  |  4.50<H>    Ca    8.0<L>      14 Jun 2021 13:48    TPro  5.2<L>  /  Alb  2.6<L>  /  TBili  0.3  /  DBili  x   /  AST  11<L>  /  ALT  8<L>  /  AlkPhos  29<L>  06-14    LIVER FUNCTIONS - ( 14 Jun 2021 13:48 )  Alb: 2.6 g/dL / Pro: 5.2 g/dL / ALK PHOS: 29 U/L / ALT: 8 U/L / AST: 11 U/L / GGT: x           PT/INR - ( 14 Jun 2021 13:48 )   PT: 12.3 sec;   INR: 1.05 ratio         PTT - ( 14 Jun 2021 13:48 )  PTT:23.6 sec    Amylase Serum--      Lipase serum63       Ammonia--                          3.8    12.74 )-----------( 345      ( 14 Jun 2021 13:48 )             12.9       Imaging:           [facility]    MD Larry Rogers MD Jaydeep Kadam, MD Faisal Sheikh, DO Kris Montoya, GRANT Blanchard      Chief Complaint:  Patient is a 86y old  Female who presents with a chief complaint of fall    HPI:    85 y/o female with PMHX CKD, anemia, HTN, HLD BIBA from home due to rectal bleeding. Pt reports having melena with each bowel movement since Saturday. pt reports feeling lightheaded since Saturday. pt reports she has hx of Anemia and recieves Procrit, notes last Hgb was 7.4. pt reports she fell Saturday in which she hit her head. pt notes bruising to right forehead. pt admits to diarrhea. pt denies chest pain, SOB, blood thinner use, fever, vomiting, abd pain, or any other complaints.    On GI ROS, pt's last EGD/colon was 5 years ago with Dr. Rhoades, she then had a VCE and was referred to Dr Montemayor for enteroscopy, and she is unclear what intervention was done.  Last year, she had repeat VCE for anemia with Dr Montemayor, no intervention at that time.  She denies any dysphagia/n/v/decreased PO intake, diarrhea/constipation or abdominal pains.    Allergies:  No Known Allergies      Home Medications:    Hospital Medications:  pantoprazole Infusion 8 mG/Hr IV Continuous <Continuous>      PMHX/PSHX:  Hypertension    Hyperlipemia    Diabetes Mellitus Type II    GERD (Gastroesophageal Reflux Disease)    Lung Cancer    Irritable bowel syndrome with diarrhea    Orthostatic hypotension    Chronic kidney disease, unspecified CKD stage    Anemia    Rectal bleeding    Hypertension    Surgery, Elective    S/P cataract surgery        Family history:  No pertinent family history in first degree relatives        Social History:     ROS:     General:  No wt loss, fevers, chills, night sweats, fatigue,   Eyes:  Good vision, no reported pain  ENT:  No sore throat, pain, runny nose, dysphagia  CV:  No pain, palpitations, hypo/hypertension  Resp:  No dyspnea, cough, tachypnea, wheezing  GI:  See HPI  :  No pain, bleeding, incontinence, nocturia  Muscle:  No pain, +weakness  Neuro:  No weakness, tingling, memory problems  Psych:  No fatigue, insomnia, mood problems, depression  Endocrine:  No polyuria, polydipsia, cold/heat intolerance  Heme:  No petechiae, ecchymosis, easy bruisability  Skin:  No rash, tattoos, scars, edema      PHYSICAL EXAM:     GENERAL:  Appears stated age, well-groomed, well-nourished, no distress  HEENT:  NC/AT,  conjunctivae clear and pink, no thyromegaly, nodules, adenopathy, no JVD, sclera -anicteric  CHEST:  Full & symmetric excursion, no increased effort, breath sounds clear  HEART:  Regular rhythm, S1, S2, no murmur/rub/S3/S4, no abdominal bruit, no edema  ABDOMEN:  Soft, non-tender, non-distended, normoactive bowel sounds  EXT:  no cyanosis, clubbing or edema  SKIN:  No rash/erythema, intact   NEURO:  Alert, oriented, no asterixis, no tremor, no encephalopathy    Vital Signs:  Vital Signs Last 24 Hrs  T(C): 36.4 (14 Jun 2021 12:30), Max: 36.4 (14 Jun 2021 12:30)  T(F): 97.5 (14 Jun 2021 12:30), Max: 97.5 (14 Jun 2021 12:30)  HR: 79 (14 Jun 2021 14:55) (79 - 92)  BP: 107/52 (14 Jun 2021 14:55) (107/52 - 122/69)  BP(mean): --  RR: 14 (14 Jun 2021 14:55) (14 - 16)  SpO2: 99% (14 Jun 2021 14:55) (96% - 99%)  Daily Height in cm: 149.86 (14 Jun 2021 12:30)    Daily     LABS:                        3.8    12.74 )-----------( 345      ( 14 Jun 2021 13:48 )             12.9     Mean Cell Volume: 95.6 fl (06-14-21 @ 13:48)    06-14    139  |  108  |  87<H>  ----------------------------<  200<H>  4.6   |  22  |  4.50<H>    Ca    8.0<L>      14 Jun 2021 13:48    TPro  5.2<L>  /  Alb  2.6<L>  /  TBili  0.3  /  DBili  x   /  AST  11<L>  /  ALT  8<L>  /  AlkPhos  29<L>  06-14    LIVER FUNCTIONS - ( 14 Jun 2021 13:48 )  Alb: 2.6 g/dL / Pro: 5.2 g/dL / ALK PHOS: 29 U/L / ALT: 8 U/L / AST: 11 U/L / GGT: x           PT/INR - ( 14 Jun 2021 13:48 )   PT: 12.3 sec;   INR: 1.05 ratio         PTT - ( 14 Jun 2021 13:48 )  PTT:23.6 sec    Amylase Serum--      Lipase serum63       Ammonia--                          3.8    12.74 )-----------( 345      ( 14 Jun 2021 13:48 )             12.9       Imaging:

## 2021-06-14 NOTE — ED ADULT NURSE NOTE - PMH
Anemia    Chronic kidney disease, unspecified CKD stage    Diabetes Mellitus Type II    GERD (Gastroesophageal Reflux Disease)    Hyperlipemia    Hypertension    Irritable bowel syndrome with diarrhea    Orthostatic hypotension    Rectal bleeding

## 2021-06-14 NOTE — H&P ADULT - PROBLEM SELECTOR PLAN 1
New episode of melena and hematochezia since Saturday ( 5-6 bloody watery diarrhea)  - FOBT positive, H/H: 3.8/12.9   - s/p pantoprazole 40mg IVP 1x, Protonix drip, NS bolus 1lt 1x and  2 pRBCs in ED   - NPO   - On PPI BID   - GI consulted, recommend NM scan to localized bleeding and EGD/ colonoscopy once medically optimized   - Monitor hemodynamics New episode of melena and hematochezia since Saturday ( 5-6 bloody watery diarrhea). Suspected UGI   - FOBT positive, H/H: 3.8/12.9   - s/p pantoprazole 40mg IVP 1x, Protonix drip, NS bolus 1lt 1x and  2 pRBCs in ED   - NPO   - On PPI BID   - GI consulted, recommend NM scan to localized bleeding and EGD/ colonoscopy once medically optimized   - Monitor hemodynamics New episode of melena and hematochezia since Saturday ( 5-6 bloody watery diarrhea). Suspected UGI   - admit for acute on chronic anemia. acute blood loss anemia.   - FOBT positive, H/H: 3.8/12.9   - hemodynamically stable   - s/p pantoprazole 40mg IVP 1x, NS bolus 1lt 1x and  2 pRBCs in ED   - NPO   - continue PPI BID   - H&H q 6 hours   - GI consulted, recommend NM scan to localized bleeding and EGD/ colonoscopy once medically optimized   - Monitor hemodynamics

## 2021-06-14 NOTE — H&P ADULT - PROBLEM SELECTOR PLAN 4
Troponin leak in the setting of active GIB  - Asymptomatic   - EKG shows NSR @ 82 bpm left axis deviation  RBBB. QTc 509  - Avoid meds to prolong QTc interval   - On admission 0.533, will trend cardiac enzymes   - Monitor hemodynamics Troponin leak in the setting of active GIB  - Asymptomatic   - EKG shows NSR @ 82 bpm left axis deviation  RBBB. QTc 509  - Avoid meds that prolong QTc interval   - On admission 0.533, will trend cardiac enzymes   - Monitor hemodynamics

## 2021-06-14 NOTE — PATIENT PROFILE ADULT - VISION (WITH CORRECTIVE LENSES IF THE PATIENT USUALLY WEARS THEM):
Glasses- vision/Partially impaired: cannot see medication labels or newsprint, but can see obstacles in path, and the surrounding layout; can count fingers at arm's length

## 2021-06-14 NOTE — PATIENT PROFILE ADULT - HOW PATIENT ADDRESSED, PROFILE
Spoke with mom who stated that things are going well at home except for some intermittent constipation which she has treated with prune juice. Mom denies any other questions or concerns.  
Katherine

## 2021-06-14 NOTE — ED ADULT NURSE NOTE - OBJECTIVE STATEMENT
pt to er sent for evaluation of rectal bleeding upon arrival is alert oriented speech clear resp even unlabored skin intact warm and dry denies pain states she currently is not bleeding iv in place pta labs drawn family at bedside with pt

## 2021-06-14 NOTE — CONSULT NOTE ADULT - ASSESSMENT
Assessment:  86 yr old female with stated hx significant for CKD5, anemia, HTN who presents with dark bloody stools over past 48 hrs accompanied by dizziness, lightheadedness. Found to have Hgb of 3.8    consult called for hypotension secondary to GI bleed  Plan:    #Neuro:  -Neuro checks q 2 hrs and prn for changes  -activity as tolerated  -physical therapy consult when stable    #Pulm:  -Supplemental O2 prn to maintain SPO2 > 92%  -Bronchodilators q 6 hrs prn for sob/wheezes  -HOB >/= 30 degree angle    #CV:  -Hx of HTN on amoldipine  -will hold antihypertensives at present  -ECG now and q am x3  -Cardiac Enzymes now and q am x 3    #GI/:  -Hx of CKD5  -strict I & O's - keep even  -NPO at present (ice chips)  -Protonix 40 mg IV q 12 hrs  -obtain bleeding scan  -obtain EGD/colonscopy   -obtain GI consult    #ID:  -COVID - 19 neg  -no issues at present    #FEN/ENDO/HEME:  -obtain CMP/Mg++/PO--4/CBC w diff/PT/PTT/INR now and q. a.m.  -CBC q 6 hrs  -obtain TCM for prbc's  -transfuse PRBC's to maintain Hgb 7.0 -7.5  -Hx of DM2  -POC glucose with ISS q 6 hrs - maintain glucose 140 -180        Critical Care Time: 40minutes   Reviewing data, imaging, discussing with multidisciplinary team, not inclusive of procedures, discussing goals of care with family Assessment:  86 yr old female with stated hx significant for CKD5, anemia, HTN who presents with dark bloody stools over past 48 hrs accompanied by dizziness, lightheadedness. Found to have Hgb of 3.8    consult called for hypotension secondary to GI bleed  Plan:    #Neuro:  -Neuro checks q 2 hrs and prn for changes  -activity as tolerated  -physical therapy consult when stable    #Pulm:  -Supplemental O2 prn to maintain SPO2 > 92%  -Bronchodilators q 6 hrs prn for sob/wheezes  -HOB >/= 30 degree angle    #CV:  -Hx of HTN on amoldipine  -will hold antihypertensives at present  -ECG now and q am x3  -Cardiac Enzymes now and q am x 3    #GI/:  -Hx of CKD5  -trend sCr  -strict I & O's - keep even  -NPO at present (ice chips)  -Protonix 40 mg IV q 12 hrs  -obtain bleeding scan  -obtain EGD/colonscopy   -obtain GI consult    #ID:  -COVID - 19 neg  -no issues at present    #FEN/ENDO/HEME:  -obtain CMP/Mg++/PO--4/CBC w diff/PT/PTT/INR now and q. a.m.  -CBC q 6 hrs  -obtain TCM for prbc's  -transfuse PRBC's to maintain Hgb 7.0 -7.5  -Hx of DM2  -POC glucose with ISS q 6 hrs - maintain glucose 140 -180  -LR @ 100 cc's/hr x 10 hrs        Critical Care Time: 40minutes   Reviewing data, imaging, discussing with multidisciplinary team, not inclusive of procedures, discussing goals of care with family

## 2021-06-15 ENCOUNTER — TRANSCRIPTION ENCOUNTER (OUTPATIENT)
Age: 86
End: 2021-06-15

## 2021-06-15 LAB
A1C WITH ESTIMATED AVERAGE GLUCOSE RESULT: 5.4 % — SIGNIFICANT CHANGE UP (ref 4–5.6)
ALBUMIN SERPL ELPH-MCNC: 2.8 G/DL — LOW (ref 3.3–5)
ALP SERPL-CCNC: 36 U/L — LOW (ref 40–120)
ALT FLD-CCNC: 13 U/L — SIGNIFICANT CHANGE UP (ref 12–78)
ANION GAP SERPL CALC-SCNC: 10 MMOL/L — SIGNIFICANT CHANGE UP (ref 5–17)
ANION GAP SERPL CALC-SCNC: 8 MMOL/L — SIGNIFICANT CHANGE UP (ref 5–17)
AST SERPL-CCNC: 19 U/L — SIGNIFICANT CHANGE UP (ref 15–37)
BASOPHILS # BLD AUTO: 0.06 K/UL — SIGNIFICANT CHANGE UP (ref 0–0.2)
BASOPHILS NFR BLD AUTO: 0.4 % — SIGNIFICANT CHANGE UP (ref 0–2)
BILIRUB SERPL-MCNC: 0.8 MG/DL — SIGNIFICANT CHANGE UP (ref 0.2–1.2)
BUN SERPL-MCNC: 76 MG/DL — HIGH (ref 7–23)
BUN SERPL-MCNC: 81 MG/DL — HIGH (ref 7–23)
CALCIUM SERPL-MCNC: 8.2 MG/DL — LOW (ref 8.5–10.1)
CALCIUM SERPL-MCNC: 8.3 MG/DL — LOW (ref 8.5–10.1)
CHLORIDE SERPL-SCNC: 110 MMOL/L — HIGH (ref 96–108)
CHLORIDE SERPL-SCNC: 111 MMOL/L — HIGH (ref 96–108)
CK MB BLD-MCNC: 4 % — HIGH (ref 0–3.5)
CK MB CFR SERPL CALC: 2.1 NG/ML — SIGNIFICANT CHANGE UP (ref 0–3.6)
CK SERPL-CCNC: 52 U/L — SIGNIFICANT CHANGE UP (ref 26–192)
CO2 SERPL-SCNC: 22 MMOL/L — SIGNIFICANT CHANGE UP (ref 22–31)
CO2 SERPL-SCNC: 24 MMOL/L — SIGNIFICANT CHANGE UP (ref 22–31)
COVID-19 SPIKE DOMAIN AB INTERP: POSITIVE
COVID-19 SPIKE DOMAIN ANTIBODY RESULT: 113 U/ML — HIGH
CREAT SERPL-MCNC: 4.2 MG/DL — HIGH (ref 0.5–1.3)
CREAT SERPL-MCNC: 4.3 MG/DL — HIGH (ref 0.5–1.3)
EOSINOPHIL # BLD AUTO: 0.55 K/UL — HIGH (ref 0–0.5)
EOSINOPHIL NFR BLD AUTO: 3.7 % — SIGNIFICANT CHANGE UP (ref 0–6)
ESTIMATED AVERAGE GLUCOSE: 108 MG/DL — SIGNIFICANT CHANGE UP (ref 68–114)
GLUCOSE SERPL-MCNC: 102 MG/DL — HIGH (ref 70–99)
GLUCOSE SERPL-MCNC: 106 MG/DL — HIGH (ref 70–99)
HCT VFR BLD CALC: 23.4 % — LOW (ref 34.5–45)
HCT VFR BLD CALC: 27.1 % — LOW (ref 34.5–45)
HCT VFR BLD CALC: 28.8 % — LOW (ref 34.5–45)
HCT VFR BLD CALC: 30.4 % — LOW (ref 34.5–45)
HGB BLD-MCNC: 10 G/DL — LOW (ref 11.5–15.5)
HGB BLD-MCNC: 7.5 G/DL — LOW (ref 11.5–15.5)
HGB BLD-MCNC: 8.9 G/DL — LOW (ref 11.5–15.5)
HGB BLD-MCNC: 9.5 G/DL — LOW (ref 11.5–15.5)
IMM GRANULOCYTES NFR BLD AUTO: 2.9 % — HIGH (ref 0–1.5)
LYMPHOCYTES # BLD AUTO: 0.97 K/UL — LOW (ref 1–3.3)
LYMPHOCYTES # BLD AUTO: 6.5 % — LOW (ref 13–44)
MAGNESIUM SERPL-MCNC: 3.2 MG/DL — HIGH (ref 1.6–2.6)
MCHC RBC-ENTMCNC: 29.2 PG — SIGNIFICANT CHANGE UP (ref 27–34)
MCHC RBC-ENTMCNC: 29.8 PG — SIGNIFICANT CHANGE UP (ref 27–34)
MCHC RBC-ENTMCNC: 32.8 GM/DL — SIGNIFICANT CHANGE UP (ref 32–36)
MCHC RBC-ENTMCNC: 33 GM/DL — SIGNIFICANT CHANGE UP (ref 32–36)
MCV RBC AUTO: 88.9 FL — SIGNIFICANT CHANGE UP (ref 80–100)
MCV RBC AUTO: 90.3 FL — SIGNIFICANT CHANGE UP (ref 80–100)
MONOCYTES # BLD AUTO: 1.07 K/UL — HIGH (ref 0–0.9)
MONOCYTES NFR BLD AUTO: 7.2 % — SIGNIFICANT CHANGE UP (ref 2–14)
NEUTROPHILS # BLD AUTO: 11.77 K/UL — HIGH (ref 1.8–7.4)
NEUTROPHILS NFR BLD AUTO: 79.3 % — HIGH (ref 43–77)
NRBC # BLD: 0 /100 WBCS — SIGNIFICANT CHANGE UP (ref 0–0)
NRBC # BLD: 0 /100 WBCS — SIGNIFICANT CHANGE UP (ref 0–0)
PHOSPHATE SERPL-MCNC: 3.4 MG/DL — SIGNIFICANT CHANGE UP (ref 2.5–4.5)
PLATELET # BLD AUTO: 297 K/UL — SIGNIFICANT CHANGE UP (ref 150–400)
PLATELET # BLD AUTO: 299 K/UL — SIGNIFICANT CHANGE UP (ref 150–400)
POTASSIUM SERPL-MCNC: 4.3 MMOL/L — SIGNIFICANT CHANGE UP (ref 3.5–5.3)
POTASSIUM SERPL-MCNC: 4.4 MMOL/L — SIGNIFICANT CHANGE UP (ref 3.5–5.3)
POTASSIUM SERPL-SCNC: 4.3 MMOL/L — SIGNIFICANT CHANGE UP (ref 3.5–5.3)
POTASSIUM SERPL-SCNC: 4.4 MMOL/L — SIGNIFICANT CHANGE UP (ref 3.5–5.3)
PROT SERPL-MCNC: 5.5 G/DL — LOW (ref 6–8.3)
RBC # BLD: 3.05 M/UL — LOW (ref 3.8–5.2)
RBC # BLD: 3.19 M/UL — LOW (ref 3.8–5.2)
RBC # FLD: 15.9 % — HIGH (ref 10.3–14.5)
RBC # FLD: 16.1 % — HIGH (ref 10.3–14.5)
SARS-COV-2 IGG+IGM SERPL QL IA: 113 U/ML — HIGH
SARS-COV-2 IGG+IGM SERPL QL IA: POSITIVE
SODIUM SERPL-SCNC: 142 MMOL/L — SIGNIFICANT CHANGE UP (ref 135–145)
SODIUM SERPL-SCNC: 143 MMOL/L — SIGNIFICANT CHANGE UP (ref 135–145)
TROPONIN I SERPL-MCNC: 0.74 NG/ML — HIGH (ref 0.01–0.04)
WBC # BLD: 10.62 K/UL — HIGH (ref 3.8–10.5)
WBC # BLD: 14.85 K/UL — HIGH (ref 3.8–10.5)
WBC # FLD AUTO: 10.62 K/UL — HIGH (ref 3.8–10.5)
WBC # FLD AUTO: 14.85 K/UL — HIGH (ref 3.8–10.5)

## 2021-06-15 PROCEDURE — 99233 SBSQ HOSP IP/OBS HIGH 50: CPT | Mod: GC

## 2021-06-15 PROCEDURE — 78278 ACUTE GI BLOOD LOSS IMAGING: CPT | Mod: 26

## 2021-06-15 PROCEDURE — 99223 1ST HOSP IP/OBS HIGH 75: CPT

## 2021-06-15 RX ORDER — SODIUM CHLORIDE 9 MG/ML
1000 INJECTION, SOLUTION INTRAVENOUS
Refills: 0 | Status: DISCONTINUED | OUTPATIENT
Start: 2021-06-15 | End: 2021-06-16

## 2021-06-15 RX ORDER — METOPROLOL TARTRATE 50 MG
25 TABLET ORAL DAILY
Refills: 0 | Status: DISCONTINUED | OUTPATIENT
Start: 2021-06-15 | End: 2021-06-19

## 2021-06-15 RX ORDER — SOD SULF/SODIUM/NAHCO3/KCL/PEG
1000 SOLUTION, RECONSTITUTED, ORAL ORAL EVERY 8 HOURS
Refills: 0 | Status: COMPLETED | OUTPATIENT
Start: 2021-06-15 | End: 2021-06-15

## 2021-06-15 RX ORDER — ERYTHROPOIETIN 10000 [IU]/ML
10000 INJECTION, SOLUTION INTRAVENOUS; SUBCUTANEOUS
Refills: 0 | Status: DISCONTINUED | OUTPATIENT
Start: 2021-06-15 | End: 2021-06-19

## 2021-06-15 RX ADMIN — ATORVASTATIN CALCIUM 20 MILLIGRAM(S): 80 TABLET, FILM COATED ORAL at 21:16

## 2021-06-15 RX ADMIN — AMLODIPINE BESYLATE 5 MILLIGRAM(S): 2.5 TABLET ORAL at 05:29

## 2021-06-15 RX ADMIN — Medication 1000 MILLILITER(S): at 22:08

## 2021-06-15 RX ADMIN — Medication 75 MILLIGRAM(S): at 21:16

## 2021-06-15 RX ADMIN — Medication 75 MILLIGRAM(S): at 03:04

## 2021-06-15 RX ADMIN — Medication 5 MILLIGRAM(S): at 17:54

## 2021-06-15 RX ADMIN — PANTOPRAZOLE SODIUM 40 MILLIGRAM(S): 20 TABLET, DELAYED RELEASE ORAL at 17:53

## 2021-06-15 RX ADMIN — Medication 1000 MILLILITER(S): at 19:04

## 2021-06-15 RX ADMIN — PANTOPRAZOLE SODIUM 40 MILLIGRAM(S): 20 TABLET, DELAYED RELEASE ORAL at 05:29

## 2021-06-15 RX ADMIN — Medication 5 MILLIGRAM(S): at 05:29

## 2021-06-15 NOTE — CONSULT NOTE ADULT - ASSESSMENT
86f T2DM, CKD stage IV, chronic anemia, HTN, HLD, insomnia, diverticulosis. No known structural heart disease.  She has a long-standing cardiac murmur, unclear if it has been evaluated in any way.  She is BIBEMS from home due blood in the stools associated with lightheadedness.  Patient reports having bright red blood with black stools with each bowel movement since Saturday (06/12/21) approximately 5 episodes.  Endorsed feeling severe lightheaded, on Saturday overnight went to bathroom and tried to stand up, lost strength in LE and fell, hit head with bruised over R eye with no LOC. Denies syncope, nausea,  vomiting, chest pain, palpitations.  Pt has history of chronic anemia, receives Procrit and IV iron last transfusion was 04/2021, last Hgb was 6.4. few weeks ago. Last colonoscopy 10 years ago found to have diverticulosis.   Denies nausea, vomiting, chest pain, SOB, fever, chills, abdominal pain, hematuria, dysuria or other symptoms at this time.     She is active at baseline she says, and is able to walk without chest discomfort or shortness of breath suggestive of angina.  She denies orthopnea and pnd.  She has had a tendency toward edema, which she says has been attributed to amlodipine.  She denies dizziness and syncope in the past. She has been on asa, unclear indication.    Since admission she has been transfused.  Troponins have been mildly elevated, and are increasing slightly.  Cardiology consultation is now being obtained.     -there is evidence of mild demand ischemia.  -she has no sxs of angina, and no ischemic changes on her resting ekg  -she has mildly elevated troponins, which continue to increase slightly  -would trend trop to peak  -this likely reflects demand in the setting of severe/life-threatening anemia, compounded by severe ckd  -it is notable and reassuring that her ce are not higher than this, which would not be surprising at all in context.  -hold asa, and see no need to resume based on background med hx  -cont statin    -there is no evidence of significant arrhythmia.    -there is no evidence for meaningful  volume overload.  -is at risk of decompensated heart failure in the setting of transfusion, etc  -would be prepared to give diuretics as needed, though does not seem to need this at this time    -if needs gi endoscopy, once hgb is more stable, would consider her optimized from a cv perspective for planned low risk procedure    -cont amlodipine    -DVT prophylaxis    -monitor electrolytes, keep k>4, Mg>2   -trend creatinine, shawn on ckd

## 2021-06-15 NOTE — PROGRESS NOTE ADULT - SUBJECTIVE AND OBJECTIVE BOX
Chief Complaint:  Patient is a 86y old  Female who presents with a chief complaint of active GIB (15 Frandy 2021 11:47)      Interval Events:     Hospital Medications:  amLODIPine   Tablet 5 milliGRAM(s) Oral daily  atorvastatin 20 milliGRAM(s) Oral at bedtime  cyanocobalamin 1000 MICROGram(s) Oral daily  dextrose 40% Gel 15 Gram(s) Oral once  dextrose 5%. 1000 milliLiter(s) IV Continuous <Continuous>  dextrose 5%. 1000 milliLiter(s) IV Continuous <Continuous>  dextrose 50% Injectable 25 Gram(s) IV Push once  dextrose 50% Injectable 12.5 Gram(s) IV Push once  dextrose 50% Injectable 25 Gram(s) IV Push once  epoetin chanell-epbx (RETACRIT) Injectable 15428 Unit(s) SubCutaneous <User Schedule>  glucagon  Injectable 1 milliGRAM(s) IntraMuscular once  insulin lispro (ADMELOG) corrective regimen sliding scale   SubCutaneous three times a day before meals  insulin lispro (ADMELOG) corrective regimen sliding scale   SubCutaneous at bedtime  magnesium oxide 400 milliGRAM(s) Oral daily  metoprolol succinate ER 25 milliGRAM(s) Oral daily  oxybutynin 5 milliGRAM(s) Oral two times a day  pantoprazole  Injectable 40 milliGRAM(s) IV Push two times a day  sodium chloride 0.45%. 1000 milliLiter(s) IV Continuous <Continuous>  traZODone 75 milliGRAM(s) Oral at bedtime  traZODone 75 milliGRAM(s) Oral <User Schedule> PRN        PHYSICAL EXAM:   Vital Signs:  Vital Signs Last 24 Hrs  T(C): 36.7 (15 Frandy 2021 11:10), Max: 37.7 (2021 20:48)  T(F): 98 (15 Frandy 2021 11:10), Max: 99.8 (2021 20:48)  HR: 85 (15 Frandy 2021 11:10) (75 - 85)  BP: 151/72 (15 Frandy 2021 11:10) (100/51 - 151/72)  BP(mean): --  RR: 18 (15 Frandy 2021 11:10) (16 - 18)  SpO2: 97% (15 Frandy 2021 11:10) (92% - 100%)  Daily     Daily Weight in k.2 (15 Frandy 2021 04:40)    GENERAL:  Appears stated age,  no distress  HEENT:   sclera -anicteric  CHEST:   no increased effort, breath sounds clear  HEART:  Regular rhythm, S1, S2,   ABDOMEN:  Soft, non-tender, non-distended, normoactive bowel sounds,    EXTREMITIES:  no cyanosis, clubbing or edema  SKIN:  No rash/no jaundice   NEURO:  Alert, oriented    LABS:                        9.5    10.62 )-----------( 299      ( 15 Frandy 2021 13:22 )             28.8     Mean Cell Volume: 90.3 fl (06-15-21 @ 13:22)    06-15    143  |  111<H>  |  76<H>  ----------------------------<  106<H>  4.3   |  24  |  4.20<H>    Ca    8.2<L>      15 Frandy 2021 05:22  Phos  3.4     06-15  Mg     3.2     06-15    TPro  5.5<L>  /  Alb  2.8<L>  /  TBili  0.8  /  DBili  x   /  AST  19  /  ALT  13  /  AlkPhos  36<L>  06-15    LIVER FUNCTIONS - ( 15 Frandy 2021 05:22 )  Alb: 2.8 g/dL / Pro: 5.5 g/dL / ALK PHOS: 36 U/L / ALT: 13 U/L / AST: 19 U/L / GGT: x           PT/INR - ( 2021 13:48 )   PT: 12.3 sec;   INR: 1.05 ratio         PTT - ( 2021 13:48 )  PTT:23.6 sec                            9.5    10.62 )-----------( 299      ( 15 Frandy 2021 13:22 )             28.8                         8.9    14.85 )-----------( 297      ( 15 Frandy 2021 05:22 )             27.1                         7.5    x     )-----------( x        ( 15 Frandy 2021 00:09 )             23.4                         3.8    12.74 )-----------( 345      ( 2021 13:48 )             12.9     Imaging:    < from: NM GI Bleed Localization (NM GI Bleed Localization .) (06.15.21 @ 15:24) >  EXAM:  NM GI BLEEDING IMG                            PROCEDURE DATE:  06/15/2021          INTERPRETATION:  RADIOPHARMACEUTICAL: 20.0 mCi Tc-99m labeled autologous red blood cells, I.V.    CLINICAL STATEMENT: 86-year-old female with melena.    TECHNIQUE:  Dynamic images of the anterior abdomen/pelvis were obtained for 2 hours following administration of radiopharmaceutical. Static images of the abdomen/pelvis images were obtained immediately thereafter. Anterior neck/chest images were obtained for  purpose.    FINDINGS: There is physiologic distribution of radiolabeled red cells in the abdomen and pelvis. There is no abnormal focus of increased activity to suggest the presence of active bleeding.    IMPRESSION: Normal GI bleeding scan.    No evidence of active bleeding site.                  HECTOR DENG MD; Attending Nuclear Medicine  This document has been electronically signed. Frandy 15 2021  3:27PM    < end of copied text >

## 2021-06-15 NOTE — PROGRESS NOTE ADULT - SUBJECTIVE AND OBJECTIVE BOX
Patient is a 86y old  Female who presents with a chief complaint of active GIB (14 Jun 2021 18:14)   Admitted for active GIB, plan for EGD/colonscopy.  pt seen and examine today  alert awake   feeling weak + , denies overnight no  melena , or fresh blood rectum , no abd pain or diarrhea , on adam for bleeding scan .  room air 94.   INTERVAL HPI/OVERNIGHT EVENTS:     T(C): 36.7 (06-15-21 @ 04:40), Max: 37.7 (06-14-21 @ 20:48)  HR: 84 (06-15-21 @ 04:40) (75 - 92)  BP: 131/69 (06-15-21 @ 04:40) (100/51 - 131/69)  RR: 17 (06-15-21 @ 04:40) (14 - 18)  SpO2: 94% (06-15-21 @ 04:40) (92% - 100%)  Wt(kg): --  I&O's Summary    14 Jun 2021 07:01  -  15 Frandy 2021 07:00  --------------------------------------------------------  IN: 300 mL / OUT: 0 mL / NET: 300 mL        REVIEW OF SYSTEMS:  CONSTITUTIONAL: No fever, weight loss,  +  fatigue  EYES: No eye pain, visual disturbances, or discharge  ENMT: ; No sinus or throat pain  NECK: No pain or stiffness  BREASTS: No pain, no masses  RESPIRATORY: No cough, wheezing, chills  ,   no   shortness of breath  CARDIOVASCULAR: No chest pain, palpitations, dizziness, or leg swelling  GASTROINTESTINAL: No abdominal or epigastric pain. No nausea, vomiting,   ; No diarrhea or constipation.  melena  over night +  ,   no fresh per rectum +   GENITOURINARY: No dysuria, frequency, hematuria, or incontinence  NEUROLOGICAL: No headaches, memory loss, loss of strength, numbness, or tremors  SKIN: No itching, burning, rashes, or lesions   MUSCULOSKELETAL: No joint pain or swelling; No muscle, back, or extremity pain    PHYSICAL EXAM:  GENERAL: NAD, weakness   HEAD:  Atraumatic, Normocephalic  EYES: EOMI, PERRLA, conjunctiva and sclera clear  ENMT:   Moist mucous membranes  NECK: Supple,   NERVOUS SYSTEM:  Alert & Oriented X3; Motor Strength 5/5 B/L upper and lower extremities;   DTRs 2+ intact and symmetric  CHEST/LUNG:  percussion bilaterally; No rales, rhonchi, wheezing,  HEART: Regular rate and rhythm; No murmurs,  no tachy   ABDOMEN: Soft, Nontender, Nondistended; Bowel sounds present  EXTREMITIES:  2+ Peripheral Pulses, No clubbing, cyanosis, or edema  SKIN: No rashes or lesions  gu adam   MEDICATIONS  (STANDING):  amLODIPine   Tablet 5 milliGRAM(s) Oral daily  atorvastatin 20 milliGRAM(s) Oral at bedtime  cyanocobalamin 1000 MICROGram(s) Oral daily  dextrose 40% Gel 15 Gram(s) Oral once  dextrose 5%. 1000 milliLiter(s) (50 mL/Hr) IV Continuous <Continuous>  dextrose 5%. 1000 milliLiter(s) (100 mL/Hr) IV Continuous <Continuous>  dextrose 50% Injectable 25 Gram(s) IV Push once  dextrose 50% Injectable 12.5 Gram(s) IV Push once  dextrose 50% Injectable 25 Gram(s) IV Push once  glucagon  Injectable 1 milliGRAM(s) IntraMuscular once  insulin lispro (ADMELOG) corrective regimen sliding scale   SubCutaneous three times a day before meals  insulin lispro (ADMELOG) corrective regimen sliding scale   SubCutaneous at bedtime  magnesium oxide 400 milliGRAM(s) Oral daily  oxybutynin 5 milliGRAM(s) Oral two times a day  pantoprazole  Injectable 40 milliGRAM(s) IV Push two times a day  traZODone 75 milliGRAM(s) Oral at bedtime    MEDICATIONS  (PRN):  traZODone 75 milliGRAM(s) Oral <User Schedule> PRN insomnia      LABS:                        8.9    14.85 )-----------( 297      ( 15 Frandy 2021 05:22 )             27.1     06-15    143  |  111<H>  |  76<H>  ----------------------------<  106<H>  4.3   |  24  |  4.20<H>    Ca    8.2<L>      15 Frandy 2021 05:22  Phos  3.4     06-15  Mg     3.2     06-15    TPro  5.5<L>  /  Alb  2.8<L>  /  TBili  0.8  /  DBili  x   /  AST  19  /  ALT  13  /  AlkPhos  36<L>  06-15    PT/INR - ( 14 Jun 2021 13:48 )   PT: 12.3 sec;   INR: 1.05 ratio         PTT - ( 14 Jun 2021 13:48 )  PTT:23.6 sec      POCT Blood Glucose.: 121 mg/dL (15 Frandy 2021 07:44)  POCT Blood Glucose.: 111 mg/dL (15 Frandy 2021 05:48)  POCT Blood Glucose.: 115 mg/dL (14 Jun 2021 22:47)              RADIOLOGY & ADDITIONAL TESTS:    Imaging Personally Reviewed:   IMPRESSION:  No specific acute inflammatory or obstructive pathology.  Diverticulosis coli without acute diverticulitis.  Additional findings as discussed    Advance Directives:   full code     Palliative Care:  Appropriate

## 2021-06-15 NOTE — CONSULT NOTE ADULT - SUBJECTIVE AND OBJECTIVE BOX
[facility]         Twila Encarnacion, Taurus, Noah, Dorothy, Andrew, Nataliya        [phone] (office)    CHIEF COMPLAINT: Patient is a 86y old  Female who presents with a chief complaint of active GIB (14 Jun 2021 18:14)      HPI:  86f T2DM, CKD stage IV, chronic anemia, HTN, HLD, insomnia, diverticulosis. No known structural heart disease.  She has a long-standing cardiac murmur, unclear if it has been evaluated in any way.  She is BIBEMS from home due blood in the stools associated with lightheadedness.  Patient reports having bright red blood with black stools with each bowel movement since Saturday (06/12/21) approximately 5 episodes.  Endorsed feeling severe lightheaded, on Saturday overnight went to bathroom and tried to stand up, lost strength in LE and fell, hit head with bruised over R eye with no LOC. Denies syncope, nausea,  vomiting, chest pain, palpitations.  Pt has history of chronic anemia, receives Procrit and IV iron last transfusion was 04/2021, last Hgb was 6.4. few weeks ago. Last colonoscopy 10 years ago found to have diverticulosis.   Denies nausea, vomiting, chest pain, SOB, fever, chills, abdominal pain, hematuria, dysuria or other symptoms at this time.     She is active at baseline she says, and is able to walk without chest discomfort or shortness of breath suggestive of angina.  She denies orthopnea and pnd.  She has had a tendency toward edema, which she says has been attributed to amlodipine.  She denies dizziness and syncope in the past. She has been on asa, unclear indication.    Since admission she has been transfused.  Troponins have been mildly elevated, and are increasing slightly.  Cardiology consultation is now being obtained.         PAST MEDICAL & SURGICAL HISTORY:  Hypertension    Hyperlipemia    Diabetes Mellitus Type II    GERD (Gastroesophageal Reflux Disease)    Irritable bowel syndrome with diarrhea    Orthostatic hypotension    Chronic kidney disease, unspecified CKD stage    Anemia    Rectal bleeding    Diverticulosis    Surgery, Elective  left arm surgery as child x 2    S/P cataract surgery  left eye        SOCIAL HISTORY: No active tobacco, alcohol or illicit drug use    FAMILY HISTORY:  Family history of CVA (Mother, Sibling)    FH: type 2 diabetes (Father)     No pertinent family history of CAD    Outpatient medications:  · 	aspirin 81 mg oral tablet: Last Dose Taken:  , 1 tab(s) orally once a day  · 	Vitamin B12 1000 mcg oral tablet: 1 tab(s) orally once a day  · 	magnesium gluconate 500 mg oral tablet: 1 tab(s) orally once a day  · 	amLODIPine 5 mg oral tablet: Last Dose Taken:  , 1 tab(s) orally once a day  · 	rosuvastatin 5 mg oral tablet: Last Dose Taken:  , 2.5 milligram(s) orally once a day  · 	Januvia 25 mg oral tablet: Last Dose Taken:  , 1 tab(s) orally once a day  · 	solifenacin 5 mg oral tablet: Last Dose Taken:  , 1 tab(s) orally once a day  · 	paricalcitol 1 mcg oral capsule: Last Dose Taken:  , 1 tab(s) orally 3 times a week  · 	traZODone 150 mg oral tablet: Last Dose Taken:  , 0.5 tab(s) orally once a day (at bedtime) then at 3AM if with insomnia  · 	omeprazole 20 mg oral delayed release capsule: Last Dose Taken:  , 1 cap(s) orally once a day    MEDICATIONS  (STANDING):  amLODIPine   Tablet 5 milliGRAM(s) Oral daily  atorvastatin 20 milliGRAM(s) Oral at bedtime  cyanocobalamin 1000 MICROGram(s) Oral daily  dextrose 40% Gel 15 Gram(s) Oral once  dextrose 5%. 1000 milliLiter(s) (50 mL/Hr) IV Continuous <Continuous>  dextrose 5%. 1000 milliLiter(s) (100 mL/Hr) IV Continuous <Continuous>  dextrose 50% Injectable 25 Gram(s) IV Push once  dextrose 50% Injectable 12.5 Gram(s) IV Push once  dextrose 50% Injectable 25 Gram(s) IV Push once  glucagon  Injectable 1 milliGRAM(s) IntraMuscular once  insulin lispro (ADMELOG) corrective regimen sliding scale   SubCutaneous three times a day before meals  insulin lispro (ADMELOG) corrective regimen sliding scale   SubCutaneous at bedtime  magnesium oxide 400 milliGRAM(s) Oral daily  oxybutynin 5 milliGRAM(s) Oral two times a day  pantoprazole  Injectable 40 milliGRAM(s) IV Push two times a day  traZODone 75 milliGRAM(s) Oral at bedtime    MEDICATIONS  (PRN):  traZODone 75 milliGRAM(s) Oral <User Schedule> PRN insomnia      Allergies    No Known Allergies    Intolerances        REVIEW OF SYSTEMS: Is negative for eye, ENT, GI, , allergic, dermatologic, musculoskeletal and neurologic, except as described above.    VITAL SIGNS:   Vital Signs Last 24 Hrs  T(C): 36.7 (15 Frandy 2021 04:40), Max: 37.7 (14 Jun 2021 20:48)  T(F): 98.1 (15 Frandy 2021 04:40), Max: 99.8 (14 Jun 2021 20:48)  HR: 84 (15 Frandy 2021 04:40) (75 - 92)  BP: 131/69 (15 Frandy 2021 04:40) (100/51 - 131/69)  BP(mean): --  RR: 17 (15 Frandy 2021 04:40) (14 - 18)  SpO2: 94% (15 Frandy 2021 04:40) (92% - 100%)    I&O's Summary    14 Jun 2021 07:01  -  15 Frandy 2021 07:00  --------------------------------------------------------  IN: 300 mL / OUT: 0 mL / NET: 300 mL        PHYSICAL EXAM:    Constitutional: NAD, awake and alert, well-developed  Eyes:  EOMI, no oral cyanosis, conjunctivae clear, anicteric.  Pulmonary: Non-labored, breath sounds are clear bilaterally, no wheezing, rales or rhonchi  Cardiovascular:  regular S1 and S2. 2/6 sys murmur.  No rubs, gallops or clicks  Gastrointestinal: Bowel Sounds present, soft, nontender.   Lymph: mild non-pitting peripheral edema.   Neurological: Alert, strength and sensitivity are grossly intact  Skin: No obvious lesions/rashes.   Psych:  Mood & affect appropriate .    LABS: All Labs Reviewed:                        8.9    14.85 )-----------( 297      ( 15 Frandy 2021 05:22 )             27.1                         7.5    x     )-----------( x        ( 15 Frandy 2021 00:09 )             23.4                         3.8    12.74 )-----------( 345      ( 14 Jun 2021 13:48 )             12.9     15 Frandy 2021 05:22    143    |  111    |  76     ----------------------------<  106    4.3     |  24     |  4.20   15 Frandy 2021 00:09    142    |  110    |  81     ----------------------------<  102    4.4     |  22     |  4.30   14 Jun 2021 13:48    139    |  108    |  87     ----------------------------<  200    4.6     |  22     |  4.50     Ca    8.2        15 Frandy 2021 05:22  Ca    8.3        15 Frandy 2021 00:09  Ca    8.0        14 Jun 2021 13:48  Phos  3.4       15 Frandy 2021 00:09  Mg     3.2       15 Frandy 2021 00:09    TPro  5.5    /  Alb  2.8    /  TBili  0.8    /  DBili  x      /  AST  19     /  ALT  13     /  AlkPhos  36     15 Frandy 2021 05:22  TPro  5.2    /  Alb  2.6    /  TBili  0.3    /  DBili  x      /  AST  11     /  ALT  8      /  AlkPhos  29     14 Jun 2021 13:48    PT/INR - ( 14 Jun 2021 13:48 )   PT: 12.3 sec;   INR: 1.05 ratio         PTT - ( 14 Jun 2021 13:48 )  PTT:23.6 sec  CARDIAC MARKERS ( 15 Frandy 2021 05:22 )  .835 ng/mL / x     / 63 U/L / x     / 2.8 ng/mL  CARDIAC MARKERS ( 15 Frandy 2021 00:09 )  .742 ng/mL / x     / 52 U/L / x     / 2.1 ng/mL  CARDIAC MARKERS ( 14 Jun 2021 13:48 )  .533 ng/mL / x     / x     / x     / x          Blood Culture:         RADIOLOGY:  < from: CT Abdomen and Pelvis No Cont (06.14.21 @ 16:25) >    EXAM:  CT ABDOMEN AND PELVIS                            *** ADDENDUM 06/14/2021  ***     There is a 2.4 cm indeterminate left adrenal nodule statistically most likely representing a cortical adenoma.    *** END OF ADDENDUM 06/14/2021  ***      PROCEDURE DATE:  06/14/2021          INTERPRETATION:  CLINICAL INFORMATION: Rectal bleeding    COMPARISON: None.    CONTRAST/COMPLICATIONS:  IV Contrast: NONE  0 cc administered   0 cc discarded  Oral Contrast: NONE  Complications: None reported at time of study completion    PROCEDURE:  CT of the Abdomen and Pelvis was performed.  Sagittal and coronal reformats were performed.    FINDINGS:  LOWER CHEST: Cardiac vascular calcification. Decrease cardiac chamber blood pool attenuation suggests an anemicstate. Bibasilar subpleural interstitial fibrosis..    LIVER: Within normal limits.  BILE DUCTS: Normal caliber.  GALLBLADDER: Within normal limits.  SPLEEN: Within normal limits.  PANCREAS: Within normal limits.  ADRENALS: Within normal limits.  KIDNEYS/URETERS: Multiple bilateral cysts including a hyperdense cyst. Bilateral punctate nonobstructive intrarenal calculi..    BLADDER: Within normal limits.  REPRODUCTIVE ORGANS: No gynecologic mass    BOWEL: No bowel obstruction. Colonic diverticula without acute diverticulitis. Appendix no appendicitis  PERITONEUM: No ascites.  VESSELS: Nonaneurysmal  RETROPERITONEUM/LYMPH NODES: No lymphadenopathy.  ABDOMINAL WALL: Within normal limits.  BONES: Degenerative change.    IMPRESSION:  No specific acute inflammatory or obstructive pathology.  Diverticulosis coli without acute diverticulitis.  Additional findings as discussed      ***Please see the addendum at the top of this report. It may contain additional important information or changes.****        JAMMIE LEARY MD; Attending Radiologist  This document has been electronically signed. Jun 14 2021  4:32PM  Addend:JAMMIE LEARY MD; Attending Radiologist  This addendum was electronically signed on: Jun 14 2021  4:44PM.    < end of copied text >    EKG: sr rbbb

## 2021-06-15 NOTE — CHART NOTE - NSCHARTNOTEFT_GEN_A_CORE
Called by RN about patient's pRBC orders. Patient is an 85 yo female with PMH of T2DM, CKD, anemia, HTN, HLD BIBA from home due to rectal bleeding admitted for active GI bleed. Hemoglobin on admission was found to be 3.8. Patient received 2 units of pRBCs in ED, with post transfusion CBC showing hemoglobin of 7.5. Patient currently receiving third unit of pRBC per RN. Patient ordered for additional 2 units of pRBCs, but given appropriate response to first 2 units of pRBCs, will check post transfusion CBC after third unit and transfuse as necessary.     Vital Signs Last 24 Hrs  T(C): 36.7 (15 Frandy 2021 04:40), Max: 37.7 (14 Jun 2021 20:48)  T(F): 98.1 (15 Frandy 2021 04:40), Max: 99.8 (14 Jun 2021 20:48)  HR: 84 (15 Frandy 2021 04:40) (75 - 92)  BP: 131/69 (15 Frandy 2021 04:40) (100/51 - 131/69)  BP(mean): --  RR: 17 (15 Frandy 2021 04:40) (14 - 18)  SpO2: 94% (15 Frandy 2021 04:40) (92% - 100%) Called by RN about patient's pRBC orders. Patient is an 85 yo female with PMH of T2DM, CKD, anemia, HTN, HLD BIBA from home due to rectal bleeding admitted for active GI bleed. Hemoglobin on admission was found to be 3.8. Patient received 2 units of pRBCs in ED, with post transfusion CBC showing hemoglobin of 7.5. Patient currently receiving third unit of pRBC per RN. Spoke with admitting team, fourth unit was ordered due to confusion about how many units patient had actually received in the ED. Given appropriate response to first 2 units of pRBCs, will check post transfusion CBC after third unit and transfuse if Hgb <8.     Vital Signs Last 24 Hrs  T(C): 36.7 (15 Frandy 2021 04:40), Max: 37.7 (14 Jun 2021 20:48)  T(F): 98.1 (15 Frandy 2021 04:40), Max: 99.8 (14 Jun 2021 20:48)  HR: 84 (15 Frandy 2021 04:40) (75 - 92)  BP: 131/69 (15 Frandy 2021 04:40) (100/51 - 131/69)  BP(mean): --  RR: 17 (15 Frandy 2021 04:40) (14 - 18)  SpO2: 94% (15 Frandy 2021 04:40) (92% - 100%)      ADDENDUM 0545  - Post transfusion CBC hemoglobin 8.9/27.1  - Per RN, no hematochezia, melena or other signs of active bleeding at this time  - Will hold fourth unit of pRBCs for now  - If any signs of bleeding, would transfuse fourth unit   - Will order repeat CBC for noon, transfusion threshold Hgb <8  - Will continue to monitor, RN to call with any changes

## 2021-06-15 NOTE — PROGRESS NOTE ADULT - SUBJECTIVE AND OBJECTIVE BOX
Patient is a 86y old  Female who presents with a chief complaint of active GIB (15 Frandy 2021 10:24)  Patient seen in follow up for CKD 4.        PAST MEDICAL HISTORY:  Hypertension    Hyperlipemia    Diabetes Mellitus Type II    GERD (Gastroesophageal Reflux Disease)    Lung Cancer    Irritable bowel syndrome with diarrhea    Orthostatic hypotension    Chronic kidney disease, unspecified CKD stage    Anemia    Rectal bleeding    Diverticulosis      MEDICATIONS  (STANDING):  amLODIPine   Tablet 5 milliGRAM(s) Oral daily  atorvastatin 20 milliGRAM(s) Oral at bedtime  cyanocobalamin 1000 MICROGram(s) Oral daily  dextrose 40% Gel 15 Gram(s) Oral once  dextrose 5%. 1000 milliLiter(s) (50 mL/Hr) IV Continuous <Continuous>  dextrose 5%. 1000 milliLiter(s) (100 mL/Hr) IV Continuous <Continuous>  dextrose 50% Injectable 25 Gram(s) IV Push once  dextrose 50% Injectable 12.5 Gram(s) IV Push once  dextrose 50% Injectable 25 Gram(s) IV Push once  glucagon  Injectable 1 milliGRAM(s) IntraMuscular once  insulin lispro (ADMELOG) corrective regimen sliding scale   SubCutaneous three times a day before meals  insulin lispro (ADMELOG) corrective regimen sliding scale   SubCutaneous at bedtime  magnesium oxide 400 milliGRAM(s) Oral daily  oxybutynin 5 milliGRAM(s) Oral two times a day  pantoprazole  Injectable 40 milliGRAM(s) IV Push two times a day  traZODone 75 milliGRAM(s) Oral at bedtime    MEDICATIONS  (PRN):  traZODone 75 milliGRAM(s) Oral <User Schedule> PRN insomnia    T(C): 36.7 (06-15-21 @ 11:10), Max: 37.7 (06-14-21 @ 20:48)  HR: 85 (06-15-21 @ 11:10) (75 - 92)  BP: 151/72 (06-15-21 @ 11:10) (100/51 - 151/72)  RR: 18 (06-15-21 @ 11:10) (14 - 18)  SpO2: 97% (06-15-21 @ 11:10) (92% - 100%)  Wt(kg): --  I&O's Detail    14 Jun 2021 07:01  -  15 Frandy 2021 07:00  --------------------------------------------------------  IN:    PRBCs (Packed Red Blood Cells): 300 mL  Total IN: 300 mL    OUT:  Total OUT: 0 mL    Total NET: 300 mL          PHYSICAL EXAM:  General: No distress  Respiratory: b/l air entry  Cardiovascular: S1 S2  Gastrointestinal: soft  Extremities:  no edema                              8.9    14.85 )-----------( 297      ( 15 Frandy 2021 05:22 )             27.1     06-15    143  |  111<H>  |  76<H>  ----------------------------<  106<H>  4.3   |  24  |  4.20<H>    Ca    8.2<L>      15 Frandy 2021 05:22  Phos  3.4     06-15  Mg     3.2     06-15    TPro  5.5<L>  /  Alb  2.8<L>  /  TBili  0.8  /  DBili  x   /  AST  19  /  ALT  13  /  AlkPhos  36<L>  06-15    CARDIAC MARKERS ( 15 Frandy 2021 05:22 )  .835 ng/mL / x     / 63 U/L / x     / 2.8 ng/mL  CARDIAC MARKERS ( 15 Frandy 2021 00:09 )  .742 ng/mL / x     / 52 U/L / x     / 2.1 ng/mL  CARDIAC MARKERS ( 14 Jun 2021 13:48 )  .533 ng/mL / x     / x     / x     / x          LIVER FUNCTIONS - ( 15 Frandy 2021 05:22 )  Alb: 2.8 g/dL / Pro: 5.5 g/dL / ALK PHOS: 36 U/L / ALT: 13 U/L / AST: 19 U/L / GGT: x               Sodium, Serum: 143 (06-15 @ 05:22)  Sodium, Serum: 142 (06-15 @ 00:09)  Sodium, Serum: 139 (06-14 @ 13:48)    Creatinine, Serum: 4.20 (06-15 @ 05:22)  Creatinine, Serum: 4.30 (06-15 @ 00:09)  Creatinine, Serum: 4.50 (06-14 @ 13:48)    Potassium, Serum: 4.3 (06-15 @ 05:22)  Potassium, Serum: 4.4 (06-15 @ 00:09)  Potassium, Serum: 4.6 (06-14 @ 13:48)    Hemoglobin: 8.9 (06-15 @ 05:22)  Hemoglobin: 7.5 (06-15 @ 00:09)  Hemoglobin: 3.8 (06-14 @ 13:48)

## 2021-06-16 ENCOUNTER — RESULT REVIEW (OUTPATIENT)
Age: 86
End: 2021-06-16

## 2021-06-16 LAB
ANION GAP SERPL CALC-SCNC: 14 MMOL/L — SIGNIFICANT CHANGE UP (ref 5–17)
BASOPHILS # BLD AUTO: 0.03 K/UL — SIGNIFICANT CHANGE UP (ref 0–0.2)
BASOPHILS NFR BLD AUTO: 0.3 % — SIGNIFICANT CHANGE UP (ref 0–2)
BUN SERPL-MCNC: 63 MG/DL — HIGH (ref 7–23)
CALCIUM SERPL-MCNC: 8 MG/DL — LOW (ref 8.5–10.1)
CHLORIDE SERPL-SCNC: 110 MMOL/L — HIGH (ref 96–108)
CO2 SERPL-SCNC: 20 MMOL/L — LOW (ref 22–31)
CREAT SERPL-MCNC: 3.8 MG/DL — HIGH (ref 0.5–1.3)
EOSINOPHIL # BLD AUTO: 0.08 K/UL — SIGNIFICANT CHANGE UP (ref 0–0.5)
EOSINOPHIL NFR BLD AUTO: 0.9 % — SIGNIFICANT CHANGE UP (ref 0–6)
FERRITIN SERPL-MCNC: 606 NG/ML — HIGH (ref 15–150)
GLUCOSE SERPL-MCNC: 147 MG/DL — HIGH (ref 70–99)
HCT VFR BLD CALC: 30.5 % — LOW (ref 34.5–45)
HGB BLD-MCNC: 9.9 G/DL — LOW (ref 11.5–15.5)
IMM GRANULOCYTES NFR BLD AUTO: 2.6 % — HIGH (ref 0–1.5)
IRON SATN MFR SERPL: 22 UG/DL — LOW (ref 30–160)
IRON SATN MFR SERPL: 7 % — LOW (ref 14–50)
LYMPHOCYTES # BLD AUTO: 0.61 K/UL — LOW (ref 1–3.3)
LYMPHOCYTES # BLD AUTO: 6.8 % — LOW (ref 13–44)
MCHC RBC-ENTMCNC: 29.3 PG — SIGNIFICANT CHANGE UP (ref 27–34)
MCHC RBC-ENTMCNC: 32.5 GM/DL — SIGNIFICANT CHANGE UP (ref 32–36)
MCV RBC AUTO: 90.2 FL — SIGNIFICANT CHANGE UP (ref 80–100)
MONOCYTES # BLD AUTO: 0.68 K/UL — SIGNIFICANT CHANGE UP (ref 0–0.9)
MONOCYTES NFR BLD AUTO: 7.6 % — SIGNIFICANT CHANGE UP (ref 2–14)
NEUTROPHILS # BLD AUTO: 7.34 K/UL — SIGNIFICANT CHANGE UP (ref 1.8–7.4)
NEUTROPHILS NFR BLD AUTO: 81.8 % — HIGH (ref 43–77)
NRBC # BLD: 0 /100 WBCS — SIGNIFICANT CHANGE UP (ref 0–0)
PLATELET # BLD AUTO: 337 K/UL — SIGNIFICANT CHANGE UP (ref 150–400)
POTASSIUM SERPL-MCNC: 3.7 MMOL/L — SIGNIFICANT CHANGE UP (ref 3.5–5.3)
POTASSIUM SERPL-SCNC: 3.7 MMOL/L — SIGNIFICANT CHANGE UP (ref 3.5–5.3)
RBC # BLD: 3.38 M/UL — LOW (ref 3.8–5.2)
RBC # FLD: 16.9 % — HIGH (ref 10.3–14.5)
SODIUM SERPL-SCNC: 144 MMOL/L — SIGNIFICANT CHANGE UP (ref 135–145)
TIBC SERPL-MCNC: 314 UG/DL — SIGNIFICANT CHANGE UP (ref 220–430)
UIBC SERPL-MCNC: 292 UG/DL — SIGNIFICANT CHANGE UP (ref 110–370)
WBC # BLD: 8.97 K/UL — SIGNIFICANT CHANGE UP (ref 3.8–10.5)
WBC # FLD AUTO: 8.97 K/UL — SIGNIFICANT CHANGE UP (ref 3.8–10.5)

## 2021-06-16 PROCEDURE — 99232 SBSQ HOSP IP/OBS MODERATE 35: CPT

## 2021-06-16 PROCEDURE — 99233 SBSQ HOSP IP/OBS HIGH 50: CPT

## 2021-06-16 PROCEDURE — 88305 TISSUE EXAM BY PATHOLOGIST: CPT | Mod: 26

## 2021-06-16 RX ORDER — INSULIN LISPRO 100/ML
VIAL (ML) SUBCUTANEOUS
Refills: 0 | Status: DISCONTINUED | OUTPATIENT
Start: 2021-06-16 | End: 2021-06-19

## 2021-06-16 RX ORDER — INSULIN LISPRO 100/ML
VIAL (ML) SUBCUTANEOUS EVERY 6 HOURS
Refills: 0 | Status: DISCONTINUED | OUTPATIENT
Start: 2021-06-16 | End: 2021-06-16

## 2021-06-16 RX ADMIN — AMLODIPINE BESYLATE 5 MILLIGRAM(S): 2.5 TABLET ORAL at 05:33

## 2021-06-16 RX ADMIN — ATORVASTATIN CALCIUM 20 MILLIGRAM(S): 80 TABLET, FILM COATED ORAL at 21:32

## 2021-06-16 RX ADMIN — Medication 2: at 17:09

## 2021-06-16 RX ADMIN — PANTOPRAZOLE SODIUM 40 MILLIGRAM(S): 20 TABLET, DELAYED RELEASE ORAL at 17:12

## 2021-06-16 RX ADMIN — PANTOPRAZOLE SODIUM 40 MILLIGRAM(S): 20 TABLET, DELAYED RELEASE ORAL at 05:33

## 2021-06-16 RX ADMIN — MAGNESIUM OXIDE 400 MG ORAL TABLET 400 MILLIGRAM(S): 241.3 TABLET ORAL at 17:09

## 2021-06-16 RX ADMIN — Medication 5 MILLIGRAM(S): at 17:09

## 2021-06-16 RX ADMIN — PREGABALIN 1000 MICROGRAM(S): 225 CAPSULE ORAL at 17:09

## 2021-06-16 RX ADMIN — Medication 1: at 05:34

## 2021-06-16 RX ADMIN — Medication 5 MILLIGRAM(S): at 05:33

## 2021-06-16 RX ADMIN — Medication 75 MILLIGRAM(S): at 21:32

## 2021-06-16 RX ADMIN — Medication 25 MILLIGRAM(S): at 05:33

## 2021-06-16 NOTE — PROGRESS NOTE ADULT - SUBJECTIVE AND OBJECTIVE BOX
Patient is a 86y old  Female who presents with a chief complaint of active GIB (16 Jun 2021 15:24)      INTERVAL HPI/OVERNIGHT EVENTS: Patient seen and examined at bedside. No overnight events occurred. Patient has no complaints at this time. Denies fevers, chills, headache, lightheadedness, chest pain, dyspnea, abdominal pain, n/v/d/c. Had BM this morning, states it was less melanotic than previous stools.     MEDICATIONS  (STANDING):  amLODIPine   Tablet 5 milliGRAM(s) Oral daily  atorvastatin 20 milliGRAM(s) Oral at bedtime  cyanocobalamin 1000 MICROGram(s) Oral daily  dextrose 40% Gel 15 Gram(s) Oral once  dextrose 5%. 1000 milliLiter(s) (50 mL/Hr) IV Continuous <Continuous>  dextrose 5%. 1000 milliLiter(s) (100 mL/Hr) IV Continuous <Continuous>  dextrose 50% Injectable 25 Gram(s) IV Push once  dextrose 50% Injectable 12.5 Gram(s) IV Push once  dextrose 50% Injectable 25 Gram(s) IV Push once  epoetin chanell-epbx (RETACRIT) Injectable 87685 Unit(s) SubCutaneous <User Schedule>  glucagon  Injectable 1 milliGRAM(s) IntraMuscular once  insulin lispro (ADMELOG) corrective regimen sliding scale   SubCutaneous every 6 hours  magnesium oxide 400 milliGRAM(s) Oral daily  metoprolol succinate ER 25 milliGRAM(s) Oral daily  oxybutynin 5 milliGRAM(s) Oral two times a day  pantoprazole  Injectable 40 milliGRAM(s) IV Push two times a day  traZODone 75 milliGRAM(s) Oral at bedtime    MEDICATIONS  (PRN):  traZODone 75 milliGRAM(s) Oral <User Schedule> PRN insomnia      Allergies    No Known Allergies    Intolerances        REVIEW OF SYSTEMS:  CONSTITUTIONAL: No fever or chills  HEENT:  No headache, no sore throat  RESPIRATORY: No cough, wheezing, or shortness of breath  CARDIOVASCULAR: No chest pain, palpitations  GASTROINTESTINAL: No abd pain, nausea, vomiting, or diarrhea  GENITOURINARY: No dysuria, frequency, or hematuria  NEUROLOGICAL: no focal weakness or dizziness  MUSCULOSKELETAL: no myalgias     Vital Signs Last 24 Hrs  T(C): 37.1 (16 Jun 2021 14:12), Max: 37.1 (16 Jun 2021 12:35)  T(F): 98.8 (16 Jun 2021 14:12), Max: 98.8 (16 Jun 2021 12:35)  HR: 74 (16 Jun 2021 14:12) (62 - 91)  BP: 140/52 (16 Jun 2021 14:12) (105/63 - 140/52)  BP(mean): --  RR: 17 (16 Jun 2021 14:12) (14 - 17)  SpO2: 99% (16 Jun 2021 14:12) (95% - 99%)    PHYSICAL EXAM:  GENERAL: NAD  HEENT:  anicteric, moist mucous membranes  CHEST/LUNG:  CTA b/l, no rales, wheezes, or rhonchi  HEART:  RRR, S1, S2, +syst murmur  ABDOMEN:  BS+, soft, nontender, nondistended  EXTREMITIES: no edema, cyanosis, or calf tenderness  NERVOUS SYSTEM: answers questions and follows commands appropriately    LABS:                        9.9    8.97  )-----------( 337      ( 16 Jun 2021 06:53 )             30.5     CBC Full  -  ( 16 Jun 2021 06:53 )  WBC Count : 8.97 K/uL  Hemoglobin : 9.9 g/dL  Hematocrit : 30.5 %  Platelet Count - Automated : 337 K/uL  Mean Cell Volume : 90.2 fl  Mean Cell Hemoglobin : 29.3 pg  Mean Cell Hemoglobin Concentration : 32.5 gm/dL  Auto Neutrophil # : 7.34 K/uL  Auto Lymphocyte # : 0.61 K/uL  Auto Monocyte # : 0.68 K/uL  Auto Eosinophil # : 0.08 K/uL  Auto Basophil # : 0.03 K/uL  Auto Neutrophil % : 81.8 %  Auto Lymphocyte % : 6.8 %  Auto Monocyte % : 7.6 %  Auto Eosinophil % : 0.9 %  Auto Basophil % : 0.3 %    16 Jun 2021 06:53    144    |  110    |  63     ----------------------------<  147    3.7     |  20     |  3.80     Ca    8.0        16 Jun 2021 06:53          CAPILLARY BLOOD GLUCOSE      POCT Blood Glucose.: 207 mg/dL (16 Jun 2021 16:45)  POCT Blood Glucose.: 149 mg/dL (16 Jun 2021 11:40)  POCT Blood Glucose.: 170 mg/dL (16 Jun 2021 05:29)  POCT Blood Glucose.: 202 mg/dL (15 Frandy 2021 22:02)          RADIOLOGY & ADDITIONAL TESTS:    Personally reviewed.     Consultant(s) Notes Reviewed:  [x] YES  [ ] NO

## 2021-06-16 NOTE — PROGRESS NOTE ADULT - SUBJECTIVE AND OBJECTIVE BOX
Patient is a 86y old  Female who presents with a chief complaint of active GIB (15 Frandy 2021 10:24)  Patient seen in follow up for CKD 4.        PAST MEDICAL HISTORY:  Hypertension    Hyperlipemia    Diabetes Mellitus Type II    GERD (Gastroesophageal Reflux Disease)    Lung Cancer    Irritable bowel syndrome with diarrhea    Orthostatic hypotension    Chronic kidney disease, unspecified CKD stage    Anemia    Rectal bleeding    Diverticulosis      MEDICATIONS  (STANDING):  amLODIPine   Tablet 5 milliGRAM(s) Oral daily  atorvastatin 20 milliGRAM(s) Oral at bedtime  cyanocobalamin 1000 MICROGram(s) Oral daily  dextrose 40% Gel 15 Gram(s) Oral once  dextrose 5%. 1000 milliLiter(s) (50 mL/Hr) IV Continuous <Continuous>  dextrose 5%. 1000 milliLiter(s) (100 mL/Hr) IV Continuous <Continuous>  dextrose 50% Injectable 25 Gram(s) IV Push once  dextrose 50% Injectable 12.5 Gram(s) IV Push once  dextrose 50% Injectable 25 Gram(s) IV Push once  epoetin chanell-epbx (RETACRIT) Injectable 98429 Unit(s) SubCutaneous <User Schedule>  glucagon  Injectable 1 milliGRAM(s) IntraMuscular once  insulin lispro (ADMELOG) corrective regimen sliding scale   SubCutaneous every 6 hours  magnesium oxide 400 milliGRAM(s) Oral daily  metoprolol succinate ER 25 milliGRAM(s) Oral daily  oxybutynin 5 milliGRAM(s) Oral two times a day  pantoprazole  Injectable 40 milliGRAM(s) IV Push two times a day  sodium chloride 0.45%. 1000 milliLiter(s) (40 mL/Hr) IV Continuous <Continuous>  traZODone 75 milliGRAM(s) Oral at bedtime    MEDICATIONS  (PRN):  traZODone 75 milliGRAM(s) Oral <User Schedule> PRN insomnia    T(C): 37.1 (06-16-21 @ 14:12), Max: 37.7 (06-14-21 @ 20:48)  HR: 74 (06-16-21 @ 14:12) (62 - 91)  BP: 140/52 (06-16-21 @ 14:12) (100/51 - 151/72)  RR: 17 (06-16-21 @ 14:12)  SpO2: 99% (06-16-21 @ 14:12)  Wt(kg): --  I&O's Detail    15 Frandy 2021 07:01  -  16 Jun 2021 07:00  --------------------------------------------------------  IN:    Oral Fluid: 120 mL    sodium chloride 0.45%: 480 mL  Total IN: 600 mL    OUT:    Voided (mL): 1550 mL  Total OUT: 1550 mL    Total NET: -950 mL                PHYSICAL EXAM:  General: No distress  Respiratory: b/l air entry  Cardiovascular: S1 S2  Gastrointestinal: soft  Extremities:  no edema                  LABORATORY:                        9.9    8.97  )-----------( 337      ( 16 Jun 2021 06:53 )             30.5     06-16    144  |  110<H>  |  63<H>  ----------------------------<  147<H>  3.7   |  20<L>  |  3.80<H>    Ca    8.0<L>      16 Jun 2021 06:53  Phos  3.4     06-15  Mg     3.2     06-15    TPro  5.5<L>  /  Alb  2.8<L>  /  TBili  0.8  /  DBili  x   /  AST  19  /  ALT  13  /  AlkPhos  36<L>  06-15    Sodium, Serum: 144 mmol/L (06-16 @ 06:53)  Sodium, Serum: 143 mmol/L (06-15 @ 05:22)  Sodium, Serum: 142 mmol/L (06-15 @ 00:09)    Potassium, Serum: 3.7 mmol/L (06-16 @ 06:53)  Potassium, Serum: 4.3 mmol/L (06-15 @ 05:22)  Potassium, Serum: 4.4 mmol/L (06-15 @ 00:09)    Hemoglobin: 9.9 g/dL (06-16 @ 06:53)  Hemoglobin: 10.0 g/dL (06-15 @ 20:18)  Hemoglobin: 9.5 g/dL (06-15 @ 13:22)  Hemoglobin: 8.9 g/dL (06-15 @ 05:22)    Creatinine, Serum 3.80 (06-16 @ 06:53)  Creatinine, Serum 4.20 (06-15 @ 05:22)  Creatinine, Serum 4.30 (06-15 @ 00:09)  Creatinine, Serum 4.50 (06-14 @ 13:48)    CARDIAC MARKERS ( 15 Frandy 2021 05:22 )  .835 ng/mL / x     / 63 U/L / x     / 2.8 ng/mL  CARDIAC MARKERS ( 15 Frandy 2021 00:09 )  .742 ng/mL / x     / 52 U/L / x     / 2.1 ng/mL      LIVER FUNCTIONS - ( 15 Frandy 2021 05:22 )  Alb: 2.8 g/dL / Pro: 5.5 g/dL / ALK PHOS: 36 U/L / ALT: 13 U/L / AST: 19 U/L / GGT: x

## 2021-06-16 NOTE — BRIEF OPERATIVE NOTE - NSICDXBRIEFPROCEDURE_GEN_ALL_CORE_FT
PROCEDURES:  Upper endoscopy 16-Jun-2021 13:36:22 see full operative report for details  .  Findings:  1. LA grade A esophagitis  2. 1 cmm HH  .  Rec:  1. PPI daily  2. anti-reflux precautions Adwoa Barnes  Colonoscopy 16-Jun-2021 13:36:38 see full operative report for details  .  Findings:  1. 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 Internal hemorrhoids, moderate on retroflexion  3. 6mm sessile polyp in cecum, removed via jumbo forceps  4. 9mm sessile polyp in ascending colon, adjacent to prior tattoo site, removed via hot snare  .  Rec:  1. f/u path  2. high fiber diet  3. outpatient VCE to eval for obscure SB bleed Adwoa Barnes

## 2021-06-16 NOTE — PROGRESS NOTE ADULT - SUBJECTIVE AND OBJECTIVE BOX
[facility] -- Twila Encarnacion, Taurus, Noah, Andrew De La Cruz Savella  Office # [phone]      Follow Up:  lightheadedness     Subjective/Observations:   No events overnight resting comfortably in bed.  No complaints of chest pain, dyspnea, or palpitations reported. No signs of orthopnea or PND.  + black stool     REVIEW OF SYSTEMS: All other review of systems is negative unless indicated above    PAST MEDICAL & SURGICAL HISTORY:  Hypertension    Hyperlipemia    Diabetes Mellitus Type II    GERD (Gastroesophageal Reflux Disease)    Irritable bowel syndrome with diarrhea    Orthostatic hypotension    Chronic kidney disease, unspecified CKD stage    Anemia    Rectal bleeding    Diverticulosis    Surgery, Elective  left arm surgery as child x 2    S/P cataract surgery  left eye        MEDICATIONS  (STANDING):  amLODIPine   Tablet 5 milliGRAM(s) Oral daily  atorvastatin 20 milliGRAM(s) Oral at bedtime  cyanocobalamin 1000 MICROGram(s) Oral daily  dextrose 40% Gel 15 Gram(s) Oral once  dextrose 5%. 1000 milliLiter(s) (50 mL/Hr) IV Continuous <Continuous>  dextrose 5%. 1000 milliLiter(s) (100 mL/Hr) IV Continuous <Continuous>  dextrose 50% Injectable 25 Gram(s) IV Push once  dextrose 50% Injectable 12.5 Gram(s) IV Push once  dextrose 50% Injectable 25 Gram(s) IV Push once  epoetin chanell-epbx (RETACRIT) Injectable 06843 Unit(s) SubCutaneous <User Schedule>  glucagon  Injectable 1 milliGRAM(s) IntraMuscular once  insulin lispro (ADMELOG) corrective regimen sliding scale   SubCutaneous every 6 hours  magnesium oxide 400 milliGRAM(s) Oral daily  metoprolol succinate ER 25 milliGRAM(s) Oral daily  oxybutynin 5 milliGRAM(s) Oral two times a day  pantoprazole  Injectable 40 milliGRAM(s) IV Push two times a day  sodium chloride 0.45%. 1000 milliLiter(s) (40 mL/Hr) IV Continuous <Continuous>  traZODone 75 milliGRAM(s) Oral at bedtime    MEDICATIONS  (PRN):  traZODone 75 milliGRAM(s) Oral <User Schedule> PRN insomnia      Allergies    No Known Allergies    Intolerances        Vital Signs Last 24 Hrs  T(C): 36.7 (16 Jun 2021 05:00), Max: 36.8 (15 Frandy 2021 19:12)  T(F): 98 (16 Jun 2021 05:00), Max: 98.3 (15 Frandy 2021 19:12)  HR: 91 (16 Jun 2021 05:00) (85 - 91)  BP: 122/73 (16 Jun 2021 05:00) (114/74 - 151/72)  BP(mean): --  RR: 17 (16 Jun 2021 05:00) (17 - 18)  SpO2: 95% (16 Jun 2021 05:00) (95% - 98%)    I&O's Summary    15 Frandy 2021 07:01  -  16 Jun 2021 07:00  --------------------------------------------------------  IN: 600 mL / OUT: 1550 mL / NET: -950 mL          PHYSICAL EXAM:  TELE:    Constitutional: NAD, awake and alert, well-developed  HEENT: Moist Mucous Membranes, Anicteric  Pulmonary: Non-labored, breath sounds are clear bilaterally, No wheezing, crackles or rhonchi  Cardiovascular: Regular, S1 and S2 nl, No murmurs, rubs, gallops or clicks  Gastrointestinal: Bowel Sounds present, soft, nontender.   Lymph: No lymphadenopathy. No peripheral edema.  Skin: No visible rashes or ulcers.  Psych:  Mood & affect appropriate    LABS: All Labs Reviewed:                        9.9    8.97  )-----------( 337      ( 16 Jun 2021 06:53 )             30.5                         10.0   x     )-----------( x        ( 15 Frandy 2021 20:18 )             30.4                         9.5    10.62 )-----------( 299      ( 15 Frandy 2021 13:22 )             28.8     16 Jun 2021 06:53    144    |  110    |  63     ----------------------------<  147    3.7     |  20     |  3.80   15 Frandy 2021 05:22    143    |  111    |  76     ----------------------------<  106    4.3     |  24     |  4.20   15 Frandy 2021 00:09    142    |  110    |  81     ----------------------------<  102    4.4     |  22     |  4.30     Ca    8.0        16 Jun 2021 06:53  Ca    8.2        15 Frandy 2021 05:22  Ca    8.3        15 Frandy 2021 00:09  Phos  3.4       15 Frandy 2021 00:09  Mg     3.2       15 Frandy 2021 00:09    TPro  5.5    /  Alb  2.8    /  TBili  0.8    /  DBili  x      /  AST  19     /  ALT  13     /  AlkPhos  36     15 Frandy 2021 05:22  TPro  5.2    /  Alb  2.6    /  TBili  0.3    /  DBili  x      /  AST  11     /  ALT  8      /  AlkPhos  29     14 Jun 2021 13:48    PT/INR - ( 14 Jun 2021 13:48 )   PT: 12.3 sec;   INR: 1.05 ratio         PTT - ( 14 Jun 2021 13:48 )  PTT:23.6 sec  CARDIAC MARKERS ( 15 Frandy 2021 05:22 )  .835 ng/mL / x     / 63 U/L / x     / 2.8 ng/mL  CARDIAC MARKERS ( 15 Frandy 2021 00:09 )  .742 ng/mL / x     / 52 U/L / x     / 2.1 ng/mL  CARDIAC MARKERS ( 14 Jun 2021 13:48 )  .533 ng/mL / x     / x     / x     / x          < from: 12 Lead ECG (06.14.21 @ 14:35) >    Ventricular Rate 76 BPM    Atrial Rate 76 BPM    P-R Interval 152 ms    QRS Duration 144 ms    Q-T Interval 440 ms    QTC Calculation(Bazett) 495 ms    P Axis 47 degrees    R Axis -42 degrees    T Axis 9 degrees    Diagnosis Line Normal sinus rhythm  Left axis deviation  Right bundle branch block  Possible Lateral infarct , age undetermined  Abnormal ECG  When compared with ECG of 14-JUN-2021 12:52, (Unconfirmed)  Borderline criteria for Lateral infarct are now present  < from: TTE Echo Doppler w/o Cont (07.09.16 @ 10:37) >    Mitral Valve: MAC, 1-2+ MR.  Aortic Valve/Aorta: Thickened and calcified trileaflet aortic valve. Peak   gradient is 20 mmHg, consistent with mild aortic stenosis.  Tricuspid Valve: normal with trace TR.  Pulmonic Valve: 2+ OH  Left Atrium: normal  Right Atrium: normal  Left Ventricle: Moderate concentric LVH with vigorous systolic function,   estimated LVEF of 70 %.  Right Ventricle: normal size and systolic function.  Pericardium/Pleura: normal, no significant pericardial effusion.  Pulmonary/RV Pressure: estimated PA systolic pressure of 61 mmHg assuming   an RA pressure of 5 mmHg.  LV Diastolic Function: Grade 1 diastolic dysfunction    Moderate concentric LVH with vigorous systolic function, estimated LVEF   of 70%. Normal RV size and systolic function. Grade 1 diastolic   dysfunction. Mitral annular calcification, 1-2+ MR. Calcified aortic   valve leaflets with evidence for mild aortic stenosis. Peak gradient is   20 mmHg. Trace TR. Estimated PA systolic pressure is 61 mmHg, assuming an   RA pressure of 5 mmHg. No significant pericardial effusion. The IVC is   not dilated.      < end of copied text >

## 2021-06-17 LAB
ANION GAP SERPL CALC-SCNC: 8 MMOL/L — SIGNIFICANT CHANGE UP (ref 5–17)
BUN SERPL-MCNC: 50 MG/DL — HIGH (ref 7–23)
CALCIUM SERPL-MCNC: 7.3 MG/DL — LOW (ref 8.5–10.1)
CHLORIDE SERPL-SCNC: 111 MMOL/L — HIGH (ref 96–108)
CK MB BLD-MCNC: 2 % — SIGNIFICANT CHANGE UP (ref 0–3.5)
CK MB CFR SERPL CALC: 2.5 NG/ML — SIGNIFICANT CHANGE UP (ref 0–3.6)
CK SERPL-CCNC: 126 U/L — SIGNIFICANT CHANGE UP (ref 26–192)
CO2 SERPL-SCNC: 24 MMOL/L — SIGNIFICANT CHANGE UP (ref 22–31)
CREAT SERPL-MCNC: 3.3 MG/DL — HIGH (ref 0.5–1.3)
GLUCOSE SERPL-MCNC: 133 MG/DL — HIGH (ref 70–99)
HCT VFR BLD CALC: 26.9 % — LOW (ref 34.5–45)
HCT VFR BLD CALC: 27.2 % — LOW (ref 34.5–45)
HGB BLD-MCNC: 8.7 G/DL — LOW (ref 11.5–15.5)
HGB BLD-MCNC: 8.8 G/DL — LOW (ref 11.5–15.5)
MCHC RBC-ENTMCNC: 30.1 PG — SIGNIFICANT CHANGE UP (ref 27–34)
MCHC RBC-ENTMCNC: 32.4 GM/DL — SIGNIFICANT CHANGE UP (ref 32–36)
MCV RBC AUTO: 93.2 FL — SIGNIFICANT CHANGE UP (ref 80–100)
NRBC # BLD: 0 /100 WBCS — SIGNIFICANT CHANGE UP (ref 0–0)
PLATELET # BLD AUTO: 289 K/UL — SIGNIFICANT CHANGE UP (ref 150–400)
POTASSIUM SERPL-MCNC: 4.2 MMOL/L — SIGNIFICANT CHANGE UP (ref 3.5–5.3)
POTASSIUM SERPL-SCNC: 4.2 MMOL/L — SIGNIFICANT CHANGE UP (ref 3.5–5.3)
RBC # BLD: 2.92 M/UL — LOW (ref 3.8–5.2)
RBC # FLD: 17 % — HIGH (ref 10.3–14.5)
SODIUM SERPL-SCNC: 143 MMOL/L — SIGNIFICANT CHANGE UP (ref 135–145)
TROPONIN I SERPL-MCNC: 0.42 NG/ML — HIGH (ref 0.01–0.04)
WBC # BLD: 10.01 K/UL — SIGNIFICANT CHANGE UP (ref 3.8–10.5)
WBC # FLD AUTO: 10.01 K/UL — SIGNIFICANT CHANGE UP (ref 3.8–10.5)

## 2021-06-17 PROCEDURE — 99233 SBSQ HOSP IP/OBS HIGH 50: CPT

## 2021-06-17 PROCEDURE — 99232 SBSQ HOSP IP/OBS MODERATE 35: CPT

## 2021-06-17 RX ADMIN — Medication 5 MILLIGRAM(S): at 06:05

## 2021-06-17 RX ADMIN — ATORVASTATIN CALCIUM 20 MILLIGRAM(S): 80 TABLET, FILM COATED ORAL at 21:29

## 2021-06-17 RX ADMIN — AMLODIPINE BESYLATE 5 MILLIGRAM(S): 2.5 TABLET ORAL at 06:05

## 2021-06-17 RX ADMIN — Medication 75 MILLIGRAM(S): at 21:32

## 2021-06-17 RX ADMIN — MAGNESIUM OXIDE 400 MG ORAL TABLET 400 MILLIGRAM(S): 241.3 TABLET ORAL at 11:50

## 2021-06-17 RX ADMIN — PANTOPRAZOLE SODIUM 40 MILLIGRAM(S): 20 TABLET, DELAYED RELEASE ORAL at 17:03

## 2021-06-17 RX ADMIN — Medication 25 MILLIGRAM(S): at 06:04

## 2021-06-17 RX ADMIN — Medication 1: at 08:27

## 2021-06-17 RX ADMIN — Medication 5 MILLIGRAM(S): at 17:03

## 2021-06-17 RX ADMIN — PANTOPRAZOLE SODIUM 40 MILLIGRAM(S): 20 TABLET, DELAYED RELEASE ORAL at 06:05

## 2021-06-17 RX ADMIN — Medication 1: at 21:34

## 2021-06-17 RX ADMIN — Medication 2: at 12:29

## 2021-06-17 RX ADMIN — ERYTHROPOIETIN 10000 UNIT(S): 10000 INJECTION, SOLUTION INTRAVENOUS; SUBCUTANEOUS at 13:50

## 2021-06-17 RX ADMIN — PREGABALIN 1000 MICROGRAM(S): 225 CAPSULE ORAL at 11:50

## 2021-06-17 NOTE — PROGRESS NOTE ADULT - SUBJECTIVE AND OBJECTIVE BOX
NEPHROLOGY PROGRESS NOTE    CHIEF COMPLAINT:  SHER/CKD    HPI:  Renal function improving.    ROS:  denies SOB    EXAM:  T(F): 98.1 (06-17-21 @ 11:10)  HR: 59 (06-17-21 @ 11:10)  BP: 119/64 (06-17-21 @ 11:10)  RR: 14 (06-17-21 @ 11:10)  SpO2: 98% (06-17-21 @ 11:10)    Conversant, in no apparent distress  Normal respiratory effort, lungs clear bilaterally  Heart RRR with no murmur, trace peripheral edema         LABS                             8.8    10.01 )-----------( 289      ( 17 Jun 2021 06:30 )             27.2          06-17    143  |  111<H>  |  50<H>  ----------------------------<  133<H>  4.2   |  24  |  3.30<H>    Ca    7.3<L>      17 Jun 2021 06:30             ASSESSMENT:  1.  SHER on CKD(4) in setting of large GI bleed and syncope, stabilized    PLAN:  Monitor daily BMP  VCE as outpatient

## 2021-06-17 NOTE — PROGRESS NOTE ADULT - SUBJECTIVE AND OBJECTIVE BOX
Patient is a 86y old  Female who presents with a chief complaint of active GIB (17 Jun 2021 12:17)      INTERVAL HPI/OVERNIGHT EVENTS: Patient seen and examined at bedside. No overnight events occurred. Patient has no complaints at this time. Denies fevers, chills, headache, lightheadedness, chest pain, dyspnea, abdominal pain, n/v/d/c.    MEDICATIONS  (STANDING):  amLODIPine   Tablet 5 milliGRAM(s) Oral daily  atorvastatin 20 milliGRAM(s) Oral at bedtime  cyanocobalamin 1000 MICROGram(s) Oral daily  dextrose 40% Gel 15 Gram(s) Oral once  dextrose 5%. 1000 milliLiter(s) (50 mL/Hr) IV Continuous <Continuous>  dextrose 5%. 1000 milliLiter(s) (100 mL/Hr) IV Continuous <Continuous>  dextrose 50% Injectable 25 Gram(s) IV Push once  dextrose 50% Injectable 12.5 Gram(s) IV Push once  dextrose 50% Injectable 25 Gram(s) IV Push once  epoetin chanell-epbx (RETACRIT) Injectable 59435 Unit(s) SubCutaneous <User Schedule>  glucagon  Injectable 1 milliGRAM(s) IntraMuscular once  insulin lispro (ADMELOG) corrective regimen sliding scale   SubCutaneous Before meals and at bedtime  magnesium oxide 400 milliGRAM(s) Oral daily  metoprolol succinate ER 25 milliGRAM(s) Oral daily  oxybutynin 5 milliGRAM(s) Oral two times a day  pantoprazole  Injectable 40 milliGRAM(s) IV Push two times a day  traZODone 75 milliGRAM(s) Oral at bedtime    MEDICATIONS  (PRN):  traZODone 75 milliGRAM(s) Oral <User Schedule> PRN insomnia      Allergies    No Known Allergies    Intolerances        REVIEW OF SYSTEMS:  CONSTITUTIONAL: No fever or chills  HEENT:  No headache, no sore throat  RESPIRATORY: No cough, wheezing, or shortness of breath  CARDIOVASCULAR: No chest pain, palpitations  GASTROINTESTINAL: No abd pain, nausea, vomiting, or diarrhea  GENITOURINARY: No dysuria, frequency, or hematuria  NEUROLOGICAL: no focal weakness or dizziness  MUSCULOSKELETAL: no myalgias     Vital Signs Last 24 Hrs  T(C): 36.7 (17 Jun 2021 11:10), Max: 36.8 (17 Jun 2021 04:27)  T(F): 98.1 (17 Jun 2021 11:10), Max: 98.3 (17 Jun 2021 04:27)  HR: 59 (17 Jun 2021 11:10) (59 - 70)  BP: 119/64 (17 Jun 2021 11:10) (119/64 - 155/64)  BP(mean): --  RR: 14 (17 Jun 2021 11:10) (14 - 17)  SpO2: 98% (17 Jun 2021 11:10) (94% - 98%)    PHYSICAL EXAM:  GENERAL: NAD  HEENT:  anicteric, moist mucous membranes  CHEST/LUNG:  CTA b/l, no rales, wheezes, or rhonchi  HEART:  RRR, S1, S2 , +systolic murmur  ABDOMEN:  BS+, soft, nontender, nondistended  EXTREMITIES: no edema, cyanosis, or calf tenderness  NERVOUS SYSTEM: answers questions and follows commands appropriately    LABS:                        8.7    x     )-----------( x        ( 17 Jun 2021 13:51 )             26.9     CBC Full  -  ( 17 Jun 2021 13:51 )  WBC Count : x  Hemoglobin : 8.7 g/dL  Hematocrit : 26.9 %  Platelet Count - Automated : x  Mean Cell Volume : x  Mean Cell Hemoglobin : x  Mean Cell Hemoglobin Concentration : x  Auto Neutrophil # : x  Auto Lymphocyte # : x  Auto Monocyte # : x  Auto Eosinophil # : x  Auto Basophil # : x  Auto Neutrophil % : x  Auto Lymphocyte % : x  Auto Monocyte % : x  Auto Eosinophil % : x  Auto Basophil % : x    17 Jun 2021 06:30    143    |  111    |  50     ----------------------------<  133    4.2     |  24     |  3.30     Ca    7.3        17 Jun 2021 06:30          CAPILLARY BLOOD GLUCOSE      POCT Blood Glucose.: 118 mg/dL (17 Jun 2021 16:51)  POCT Blood Glucose.: 202 mg/dL (17 Jun 2021 11:59)  POCT Blood Glucose.: 159 mg/dL (17 Jun 2021 07:42)  POCT Blood Glucose.: 132 mg/dL (16 Jun 2021 21:42)          RADIOLOGY & ADDITIONAL TESTS:    Personally reviewed.     Consultant(s) Notes Reviewed:  [x] YES  [ ] NO

## 2021-06-17 NOTE — PHYSICAL THERAPY INITIAL EVALUATION ADULT - ADDITIONAL COMMENTS
pt lives in a house w/13 steps/rail.  Pt report is a community ambulator and is able to drive a car.  Pt's family lives nearby.

## 2021-06-17 NOTE — PROGRESS NOTE ADULT - SUBJECTIVE AND OBJECTIVE BOX
[facility] -- Twila Encarnacion Grossman, Wachsman, Pannella, Patel, Savella, Goodger  Office # [phone]    Follow Up: Preop/Postop Optimization      Subjective/Observations: Awake and alert, c/o lightheadedness even at rest but worse upon getting up.  Denies any respiratory or cardiac discomfort.  Denies any bloody stool    REVIEW OF SYSTEMS: All other review of systems is negative unless indicated above  PAST MEDICAL & SURGICAL HISTORY:  Hypertension    Hyperlipemia    Diabetes Mellitus Type II    GERD (Gastroesophageal Reflux Disease)    Irritable bowel syndrome with diarrhea    Orthostatic hypotension    Chronic kidney disease, unspecified CKD stage    Anemia    Rectal bleeding    Diverticulosis    Surgery, Elective  left arm surgery as child x 2    S/P cataract surgery  left eye    MEDICATIONS  (STANDING):  amLODIPine   Tablet 5 milliGRAM(s) Oral daily  atorvastatin 20 milliGRAM(s) Oral at bedtime  cyanocobalamin 1000 MICROGram(s) Oral daily  dextrose 40% Gel 15 Gram(s) Oral once  dextrose 5%. 1000 milliLiter(s) (50 mL/Hr) IV Continuous <Continuous>  dextrose 5%. 1000 milliLiter(s) (100 mL/Hr) IV Continuous <Continuous>  dextrose 50% Injectable 25 Gram(s) IV Push once  dextrose 50% Injectable 12.5 Gram(s) IV Push once  dextrose 50% Injectable 25 Gram(s) IV Push once  epoetin chanell-epbx (RETACRIT) Injectable 32435 Unit(s) SubCutaneous <User Schedule>  glucagon  Injectable 1 milliGRAM(s) IntraMuscular once  insulin lispro (ADMELOG) corrective regimen sliding scale   SubCutaneous Before meals and at bedtime  magnesium oxide 400 milliGRAM(s) Oral daily  metoprolol succinate ER 25 milliGRAM(s) Oral daily  oxybutynin 5 milliGRAM(s) Oral two times a day  pantoprazole  Injectable 40 milliGRAM(s) IV Push two times a day  traZODone 75 milliGRAM(s) Oral at bedtime    MEDICATIONS  (PRN):  traZODone 75 milliGRAM(s) Oral <User Schedule> PRN insomnia    Allergies    No Known Allergies    Intolerances    Vital Signs Last 24 Hrs  T(C): 36.8 (17 Jun 2021 04:27), Max: 37.1 (16 Jun 2021 12:35)  T(F): 98.3 (17 Jun 2021 04:27), Max: 98.8 (16 Jun 2021 12:35)  HR: 65 (17 Jun 2021 04:27) (62 - 90)  BP: 155/64 (17 Jun 2021 04:27) (104/69 - 155/64)  BP(mean): --  RR: 17 (17 Jun 2021 04:27) (14 - 17)  SpO2: 94% (17 Jun 2021 04:27) (94% - 99%)  I&O's Summary    16 Jun 2021 07:01  -  17 Jun 2021 07:00  --------------------------------------------------------  IN: 240 mL / OUT: 0 mL / NET: 240 mL    PHYSICAL EXAM:  TELE:  SB  Constitutional: NAD, awake and alert, well-developed  HEENT: Moist Mucous Membranes, Anicteric  Pulmonary: Non-labored, breath sounds are clear bilaterally, No wheezing, rales or rhonchi  Cardiovascular: Regular, S1 and S2, + murmurs, no rubs, gallops or clicks  Gastrointestinal: Bowel Sounds present, soft, nontender.   Lymph: No peripheral edema. No lymphadenopathy.  Skin: No visible rashes or ulcers.  Pale in color  Psych:  Mood & affect appropriate  LABS: All Labs Reviewed:                        8.8    10.01 )-----------( 289      ( 17 Jun 2021 06:30 )             27.2                         9.9    8.97  )-----------( 337      ( 16 Jun 2021 06:53 )             30.5                         10.0   x     )-----------( x        ( 15 Frandy 2021 20:18 )             30.4     17 Jun 2021 06:30    143    |  111    |  50     ----------------------------<  133    4.2     |  24     |  3.30   16 Jun 2021 06:53    144    |  110    |  63     ----------------------------<  147    3.7     |  20     |  3.80   15 Frandy 2021 05:22    143    |  111    |  76     ----------------------------<  106    4.3     |  24     |  4.20     Ca    7.3        17 Jun 2021 06:30  Ca    8.0        16 Jun 2021 06:53  Ca    8.2        15 Frandy 2021 05:22  Phos  3.4       15 Frandy 2021 00:09  Mg     3.2       15 Frandy 2021 00:09    TPro  5.5    /  Alb  2.8    /  TBili  0.8    /  DBili  x      /  AST  19     /  ALT  13     /  AlkPhos  36     15 Frandy 2021 05:22  TPro  5.2    /  Alb  2.6    /  TBili  0.3    /  DBili  x      /  AST  11     /  ALT  8      /  AlkPhos  29     14 Jun 2021 13:48    CARDIAC MARKERS ( 17 Jun 2021 06:30 )  .416 ng/mL / x     / 126 U/L / x     / 2.5 ng/mL      Patient name: MONIKA AGEE  YOB: 1935   Age: 82 (F)   MR#: [id number]  Study Date: 11/20/2017  Location: [address] [place], [place] [id number]: Katie Acevedo RDCS  Study quality: Technically fair  Referring Physician: Shaun Cerda MD  Blood Pressure: 184/91 mmHg  Height: 150 cm  Weight: 59 kg  BSA: 1.5 m2  Heart Rate: 74 mmHg  ------------------------------------------------------------------------  PROCEDURE: Transthoracic echocardiogram with 2-D, M-Mode  and complete spectral and color flow Doppler.  INDICATION: Syncope and collapse (R55)  ------------------------------------------------------------------------  Dimensions:    Normal Values:  LA:     3.7    2.0 - 4.0 cm  Ao:     2.6    2.0 - 3.8 cm  SEPTUM: 1.9    0.6 - 1.2 cm  PWT: 1.2    0.6 - 1.1 cm  LVIDd:  4.0    3.0 - 5.6 cm  LVIDs:  2.8    1.8 - 4.0 cm  Derived variables:  LVMI: 159 g/m2  RWT: 0.60  Fractional short: 30 %  EF (Teicholtz): 58 %  Doppler Peak Velocity (m/sec): MV=1.8 AoV=2.0  ------------------------------------------------------------------------  Observations:  Mitral Valve: Mitral annular calcification, otherwise  normal mitral valve. Mild mitral regurgitation.  Aortic Valve/Aorta: Calcified trileaflet aortic valve with  decreased opening. Peak transaortic valve gradient equals  16 mm Hg, mean transaortic valve gradient equals 8 mm Hg,  estimated aortic valve area equals 1.6 sqcm (by continuity  equation), aortic valve velocity time integral equals 42  cm, consistent with mild aortic stenosis. Mild aortic  regurgitation.  Peak left ventricular outflow tract  gradient equals 7 mm Hg, mean gradient is equal to 4 mm Hg,  LVOT velocity time integral equals 26 cm.  Aortic Root: 2.6 cm.  LVOT diameter: 1.8 cm.  Left Atrium: Normal left atrium.  Left Ventricle: Normal left ventricular systolic function.  No segmental wall motion abnormalities. Severe concentric  left ventricular hypertrophy. Mild diastolic dysfunction  (Stage I).  Right Heart: Normal right atrium. Normal right ventricular  sizewith decreased right ventricular systolic function.  Normal tricuspid valve. No tricuspid regurgitation. Mild  pulmonic regurgitation.  Pericardium/Pleura: Normal pericardium with no pericardial  effusion.  Hemodynamic: Estimated right atrial pressure is 8 mm Hg.  ------------------------------------------------------------------------  Conclusions:  1. Calcified trileaflet aortic valve with decreased  opening. Peak transaortic valve gradient equals 16 mm Hg,  mean transaortic valve gradient equals 8 mm Hg, estimated  aortic valve area equals 1.6 sqcm (by continuity equation),  aortic valve velocity time integral equals 42 cm,  consistent with mild aortic stenosis.  2. Normal left atrium.  3. Severe concentric left ventricular hypertrophy.  4. Normal left ventricular systolic function. No segmental  wall motion abnormalities.  5. Mild diastolic dysfunction (Stage I).  6. Normal right ventricular size with decreased right  ventricular systolic function.  *** No previous Echo exam.  ------------------------------------------------------------------------  Confirmed on  11/21/2017 - 11:45:46 by Heather Romero M.D.  ------------------------------------------------------------------------    EXAM:  CT CHEST                            PROCEDURE DATE:  11/17/2017          INTERPRETATION:  CLINICAL INFORMATION: Status post fall this morning with   chest wall pain.    COMPARISON: None available.    PROCEDURE:   CT of the Chest was performed without intravenous contrast.  Sagittal and coronal reformats were performed.      FINDINGS:    CHEST:     LUNGS AND LARGE AIRWAYS: Patent central airways. Extensive centrilobular   emphysema, most prominent within the upper lobes.  PLEURA: No pleural effusion or pneumothorax.  VESSELS: Calcified atherosclerosis of the thoracic aorta which is   otherwise normal in caliber.  HEART: Heart size is enlarged. No pericardial effusion. Coronary artery   and mitral annular calcification.  MEDIASTINUM AND RIGO: Small volume nonspecific mediastinal lymph nodes.  CHEST WALL AND LOWER NECK: Within normal limits.  VISUALIZED UPPER ABDOMEN: A few subcentimeter hyperdense right upper pole   renal lesions, may represent proteinaceous versus hemorrhagic cysts   nonspecific thickening of the left adrenal gland..  BONES: Acute mildly displaced fracture of the right lateral sixth rib.    IMPRESSION:    Acute mildly displaced right lateral sixth rib fracture. No pneumothorax.    FUNMILAYO GOMES M.D., ATTENDING RADIOLOGIST  This document has been electronically signed. Nov 17 2017  9:38PM    Ventricular Rate 76 BPM    Atrial Rate 76 BPM    P-R Interval 152 ms    QRS Duration 144 ms    Q-T Interval 440 ms    QTC Calculation(Bazett) 495 ms    P Axis 47 degrees    R Axis -42 degrees    T Axis 9 degrees    Diagnosis Line Normal sinus rhythm  Left axis deviation  Right bundle branch block  Possible Lateral infarct , age undetermined  Abnormal ECG  When compared with ECG of 14-JUN-2021 12:52, (Unconfirmed)  Borderline criteria for Lateral infarct are now present  Confirmed by Isaac Zamora MD ([id number]) on 6/15/2021 1:32:46 PM                 [facility] -- Twila Encarnacion Grossman, Wachsman, Pannella, Patel, Savella, Goodger  Office # [phone]    Follow Up: Preop/Postop Optimization      Subjective/Observations: Awake and alert, c/o lightheadedness even at rest but worse upon getting up.  Denies any respiratory or cardiac discomfort.  Denies any bloody stool    REVIEW OF SYSTEMS: All other review of systems is negative unless indicated above  PAST MEDICAL & SURGICAL HISTORY:  Hypertension    Hyperlipemia    Diabetes Mellitus Type II    GERD (Gastroesophageal Reflux Disease)    Irritable bowel syndrome with diarrhea    Orthostatic hypotension    Chronic kidney disease, unspecified CKD stage    Anemia    Rectal bleeding    Diverticulosis    Surgery, Elective  left arm surgery as child x 2    S/P cataract surgery  left eye    MEDICATIONS  (STANDING):  amLODIPine   Tablet 5 milliGRAM(s) Oral daily  atorvastatin 20 milliGRAM(s) Oral at bedtime  cyanocobalamin 1000 MICROGram(s) Oral daily  dextrose 40% Gel 15 Gram(s) Oral once  dextrose 5%. 1000 milliLiter(s) (50 mL/Hr) IV Continuous <Continuous>  dextrose 5%. 1000 milliLiter(s) (100 mL/Hr) IV Continuous <Continuous>  dextrose 50% Injectable 25 Gram(s) IV Push once  dextrose 50% Injectable 12.5 Gram(s) IV Push once  dextrose 50% Injectable 25 Gram(s) IV Push once  epoetin chanell-epbx (RETACRIT) Injectable 64507 Unit(s) SubCutaneous <User Schedule>  glucagon  Injectable 1 milliGRAM(s) IntraMuscular once  insulin lispro (ADMELOG) corrective regimen sliding scale   SubCutaneous Before meals and at bedtime  magnesium oxide 400 milliGRAM(s) Oral daily  metoprolol succinate ER 25 milliGRAM(s) Oral daily  oxybutynin 5 milliGRAM(s) Oral two times a day  pantoprazole  Injectable 40 milliGRAM(s) IV Push two times a day  traZODone 75 milliGRAM(s) Oral at bedtime    MEDICATIONS  (PRN):  traZODone 75 milliGRAM(s) Oral <User Schedule> PRN insomnia    Allergies    No Known Allergies    Intolerances    Vital Signs Last 24 Hrs  T(C): 36.8 (17 Jun 2021 04:27), Max: 37.1 (16 Jun 2021 12:35)  T(F): 98.3 (17 Jun 2021 04:27), Max: 98.8 (16 Jun 2021 12:35)  HR: 65 (17 Jun 2021 04:27) (62 - 90)  BP: 155/64 (17 Jun 2021 04:27) (104/69 - 155/64)  BP(mean): --  RR: 17 (17 Jun 2021 04:27) (14 - 17)  SpO2: 94% (17 Jun 2021 04:27) (94% - 99%)  I&O's Summary    16 Jun 2021 07:01  -  17 Jun 2021 07:00  --------------------------------------------------------  IN: 240 mL / OUT: 0 mL / NET: 240 mL    PHYSICAL EXAM:  TELE:  SB  Constitutional: NAD, awake    HEENT: Moist Mucous Membranes, Anicteric  Pulmonary: Non-labored, breath sounds are clear bilaterally, No wheezing, rales or rhonchi  Cardiovascular: Regular, S1 and S2, + murmurs, no rubs, gallops or clicks  Gastrointestinal: Bowel Sounds present, soft, nontender.   Lymph: No peripheral edema. No lymphadenopathy.  Skin: No visible rashes or ulcers.  Pale in color  Psych:  Mood & affect appropriate  LABS: All Labs Reviewed:                        8.8    10.01 )-----------( 289      ( 17 Jun 2021 06:30 )             27.2                         9.9    8.97  )-----------( 337      ( 16 Jun 2021 06:53 )             30.5                         10.0   x     )-----------( x        ( 15 Frandy 2021 20:18 )             30.4     17 Jun 2021 06:30    143    |  111    |  50     ----------------------------<  133    4.2     |  24     |  3.30   16 Jun 2021 06:53    144    |  110    |  63     ----------------------------<  147    3.7     |  20     |  3.80   15 Frandy 2021 05:22    143    |  111    |  76     ----------------------------<  106    4.3     |  24     |  4.20     Ca    7.3        17 Jun 2021 06:30  Ca    8.0        16 Jun 2021 06:53  Ca    8.2        15 Frandy 2021 05:22  Phos  3.4       15 Frandy 2021 00:09  Mg     3.2       15 Frandy 2021 00:09    TPro  5.5    /  Alb  2.8    /  TBili  0.8    /  DBili  x      /  AST  19     /  ALT  13     /  AlkPhos  36     15 Frandy 2021 05:22  TPro  5.2    /  Alb  2.6    /  TBili  0.3    /  DBili  x      /  AST  11     /  ALT  8      /  AlkPhos  29     14 Jun 2021 13:48    CARDIAC MARKERS ( 17 Jun 2021 06:30 )  .416 ng/mL / x     / 126 U/L / x     / 2.5 ng/mL      Patient name: MONIKA AGEE  YOB: 1935   Age: 82 (F)   MR#: [id number]  Study Date: 11/20/2017  Location: [address] [place], [place] [id number]: Katie Acevedo RDCS  Study quality: Technically fair  Referring Physician: Shaun Cerda MD  Blood Pressure: 184/91 mmHg  Height: 150 cm  Weight: 59 kg  BSA: 1.5 m2  Heart Rate: 74 mmHg  ------------------------------------------------------------------------  PROCEDURE: Transthoracic echocardiogram with 2-D, M-Mode  and complete spectral and color flow Doppler.  INDICATION: Syncope and collapse (R55)  ------------------------------------------------------------------------  Dimensions:    Normal Values:  LA:     3.7    2.0 - 4.0 cm  Ao:     2.6    2.0 - 3.8 cm  SEPTUM: 1.9    0.6 - 1.2 cm  PWT: 1.2    0.6 - 1.1 cm  LVIDd:  4.0    3.0 - 5.6 cm  LVIDs:  2.8    1.8 - 4.0 cm  Derived variables:  LVMI: 159 g/m2  RWT: 0.60  Fractional short: 30 %  EF (Teicholtz): 58 %  Doppler Peak Velocity (m/sec): MV=1.8 AoV=2.0  ------------------------------------------------------------------------  Observations:  Mitral Valve: Mitral annular calcification, otherwise  normal mitral valve. Mild mitral regurgitation.  Aortic Valve/Aorta: Calcified trileaflet aortic valve with  decreased opening. Peak transaortic valve gradient equals  16 mm Hg, mean transaortic valve gradient equals 8 mm Hg,  estimated aortic valve area equals 1.6 sqcm (by continuity  equation), aortic valve velocity time integral equals 42  cm, consistent with mild aortic stenosis. Mild aortic  regurgitation.  Peak left ventricular outflow tract  gradient equals 7 mm Hg, mean gradient is equal to 4 mm Hg,  LVOT velocity time integral equals 26 cm.  Aortic Root: 2.6 cm.  LVOT diameter: 1.8 cm.  Left Atrium: Normal left atrium.  Left Ventricle: Normal left ventricular systolic function.  No segmental wall motion abnormalities. Severe concentric  left ventricular hypertrophy. Mild diastolic dysfunction  (Stage I).  Right Heart: Normal right atrium. Normal right ventricular  sizewith decreased right ventricular systolic function.  Normal tricuspid valve. No tricuspid regurgitation. Mild  pulmonic regurgitation.  Pericardium/Pleura: Normal pericardium with no pericardial  effusion.  Hemodynamic: Estimated right atrial pressure is 8 mm Hg.  ------------------------------------------------------------------------  Conclusions:  1. Calcified trileaflet aortic valve with decreased  opening. Peak transaortic valve gradient equals 16 mm Hg,  mean transaortic valve gradient equals 8 mm Hg, estimated  aortic valve area equals 1.6 sqcm (by continuity equation),  aortic valve velocity time integral equals 42 cm,  consistent with mild aortic stenosis.  2. Normal left atrium.  3. Severe concentric left ventricular hypertrophy.  4. Normal left ventricular systolic function. No segmental  wall motion abnormalities.  5. Mild diastolic dysfunction (Stage I).  6. Normal right ventricular size with decreased right  ventricular systolic function.  *** No previous Echo exam.  ------------------------------------------------------------------------  Confirmed on  11/21/2017 - 11:45:46 by Heather Romero M.D.  ------------------------------------------------------------------------    EXAM:  CT CHEST                            PROCEDURE DATE:  11/17/2017          INTERPRETATION:  CLINICAL INFORMATION: Status post fall this morning with   chest wall pain.    COMPARISON: None available.    PROCEDURE:   CT of the Chest was performed without intravenous contrast.  Sagittal and coronal reformats were performed.      FINDINGS:    CHEST:     LUNGS AND LARGE AIRWAYS: Patent central airways. Extensive centrilobular   emphysema, most prominent within the upper lobes.  PLEURA: No pleural effusion or pneumothorax.  VESSELS: Calcified atherosclerosis of the thoracic aorta which is   otherwise normal in caliber.  HEART: Heart size is enlarged. No pericardial effusion. Coronary artery   and mitral annular calcification.  MEDIASTINUM AND RIGO: Small volume nonspecific mediastinal lymph nodes.  CHEST WALL AND LOWER NECK: Within normal limits.  VISUALIZED UPPER ABDOMEN: A few subcentimeter hyperdense right upper pole   renal lesions, may represent proteinaceous versus hemorrhagic cysts   nonspecific thickening of the left adrenal gland..  BONES: Acute mildly displaced fracture of the right lateral sixth rib.    IMPRESSION:    Acute mildly displaced right lateral sixth rib fracture. No pneumothorax.    FUNMILAYO GOMES M.D., ATTENDING RADIOLOGIST  This document has been electronically signed. Nov 17 2017  9:38PM    Ventricular Rate 76 BPM    Atrial Rate 76 BPM    P-R Interval 152 ms    QRS Duration 144 ms    Q-T Interval 440 ms    QTC Calculation(Bazett) 495 ms    P Axis 47 degrees    R Axis -42 degrees    T Axis 9 degrees    Diagnosis Line Normal sinus rhythm  Left axis deviation  Right bundle branch block  Possible Lateral infarct , age undetermined  Abnormal ECG  When compared with ECG of 14-JUN-2021 12:52, (Unconfirmed)  Borderline criteria for Lateral infarct are now present  Confirmed by Isaac Zamora MD ([id number]) on 6/15/2021 1:32:46 PM                 Patient/Spouse/Significant Other

## 2021-06-17 NOTE — PROGRESS NOTE ADULT - SUBJECTIVE AND OBJECTIVE BOX
Chief Complaint:  Patient is a 86y old  Female who presents with a chief complaint of active GIB (2021 10:27)      Interval Events:   no complaints    Hospital Medications:  amLODIPine   Tablet 5 milliGRAM(s) Oral daily  atorvastatin 20 milliGRAM(s) Oral at bedtime  cyanocobalamin 1000 MICROGram(s) Oral daily  dextrose 40% Gel 15 Gram(s) Oral once  dextrose 5%. 1000 milliLiter(s) IV Continuous <Continuous>  dextrose 5%. 1000 milliLiter(s) IV Continuous <Continuous>  dextrose 50% Injectable 25 Gram(s) IV Push once  dextrose 50% Injectable 12.5 Gram(s) IV Push once  dextrose 50% Injectable 25 Gram(s) IV Push once  epoetin chanell-epbx (RETACRIT) Injectable 82036 Unit(s) SubCutaneous <User Schedule>  glucagon  Injectable 1 milliGRAM(s) IntraMuscular once  insulin lispro (ADMELOG) corrective regimen sliding scale   SubCutaneous Before meals and at bedtime  magnesium oxide 400 milliGRAM(s) Oral daily  metoprolol succinate ER 25 milliGRAM(s) Oral daily  oxybutynin 5 milliGRAM(s) Oral two times a day  pantoprazole  Injectable 40 milliGRAM(s) IV Push two times a day  traZODone 75 milliGRAM(s) Oral at bedtime  traZODone 75 milliGRAM(s) Oral <User Schedule> PRN        PHYSICAL EXAM:   Vital Signs:  Vital Signs Last 24 Hrs  T(C): 36.7 (2021 11:10), Max: 37.1 (2021 12:35)  T(F): 98.1 (2021 11:10), Max: 98.8 (2021 12:35)  HR: 59 (2021 11:10) (59 - 74)  BP: 119/64 (2021 11:10) (104/69 - 155/64)  BP(mean): --  RR: 14 (2021 11:10) (14 - 17)  SpO2: 98% (2021 11:10) (94% - 99%)  Daily     Daily Weight in k.9 (2021 04:27)    GENERAL:  Appears stated age,  no distress  HEENT:   sclera -anicteric  CHEST:   no increased effort, breath sounds clear  HEART:  Regular rhythm, S1, S2,   ABDOMEN:  Soft, non-tender, non-distended, normoactive bowel sounds,    EXTREMITIES:  no cyanosis, clubbing or edema  SKIN:  No rash/no jaundice   NEURO:  Alert, oriented    LABS:                        8.8    10.01 )-----------( 289      ( 2021 06:30 )             27.2     Mean Cell Volume: 93.2 fl (- @ 06:30)        143  |  111<H>  |  50<H>  ----------------------------<  133<H>  4.2   |  24  |  3.30<H>    Ca    7.3<L>      2021 06:30                                    8.8    10.01 )-----------( 289      ( 2021 06:30 )             27.2                         9.9    8.97  )-----------( 337      ( 2021 06:53 )             30.5                         10.0   x     )-----------( x        ( 15 Frandy 2021 20:18 )             30.4                         9.5    10.62 )-----------( 299      ( 15 Frandy 2021 13:22 )             28.8                         8.9    14.85 )-----------( 297      ( 15 Frandy 2021 05:22 )             27.1     Imaging:

## 2021-06-17 NOTE — GOALS OF CARE CONVERSATION - ADVANCED CARE PLANNING - CONVERSATION DETAILS
Writer met with patient and permission to speak with Mouna present. Reviewed patient's medical and social history as well as events leading to patient's hospitalization. Writer discussed patient's current diagnosis (  GI bleed, ch anemia: Hgb 3.8 : transfused, HX: HTN, DM) , medical condition and management,   Inquired about patient's wishes regarding extent of medical care to be provided including escalation of medical care into the ICU and use of vasopressor support. In addition, the writer inquired about thoughts regarding cardiopulmonary resuscitation, artificial nutrition and hydration including use of feeding tubes and IVF, antibiotics, and further investigative studies such as blood draws and radiology. Patient  showed good  insight into her medical condition. All questions answered. Patient has no HCP, writer recommended completing HCP and patient spoke of her resuscitation wishes:  Patient consented to DNR/DNI code status. MOLST form filled out and placed in chart. Patient completed HCP: daughterMouna is hcp, alt hcp is son.  PC RN contact # given.  Psychosocial support provided.

## 2021-06-17 NOTE — PROVIDER CONTACT NOTE (CHANGE IN STATUS NOTIFICATION) - ASSESSMENT
Pt A&Ox4. Pt denies any CP/CD, SOB, palpitations. Pt c/o generalized cramping and weakness in B/L U.E. & B/L L.E.E

## 2021-06-17 NOTE — PHYSICAL THERAPY INITIAL EVALUATION ADULT - ASR WT BEARING STATUS EVAL
----- Message from Mary Reyes sent at 5/8/2018  2:39 PM CDT -----  Contact: Patient  Patient states that she is suppose to have a prescription for hydrochlorothiazide called in, but pharmacy does not have anything, please call her back at [phone]. Thank you   no weight-bearing restrictions

## 2021-06-17 NOTE — PHYSICAL THERAPY INITIAL EVALUATION ADULT - PERTINENT HX OF CURRENT PROBLEM, REHAB EVAL
85 y/o F BIBEMS due to blood in stools associated with lightheadedness s/p fall, hitting her head, bruised over R eye w/o LOC.  Patient reports having bright red blood with black stools with each bowel movement since Saturday (06/12/21) ~5 episodes. Denies syncope, nausea,  vomiting, chest pain, palpitations. Labs significant for FOBT positive. CT head/cervical: no evidence of skull fracture, intracranial hemorrhage or mass effect, no C-spine fracture.

## 2021-06-18 ENCOUNTER — TRANSCRIPTION ENCOUNTER (OUTPATIENT)
Age: 86
End: 2021-06-18

## 2021-06-18 LAB
24R-OH-CALCIDIOL SERPL-MCNC: 47.1 NG/ML — SIGNIFICANT CHANGE UP (ref 30–80)
ALBUMIN SERPL ELPH-MCNC: 2.6 G/DL — LOW (ref 3.3–5)
ALP SERPL-CCNC: 41 U/L — SIGNIFICANT CHANGE UP (ref 40–120)
ALT FLD-CCNC: 15 U/L — SIGNIFICANT CHANGE UP (ref 12–78)
ANION GAP SERPL CALC-SCNC: 5 MMOL/L — SIGNIFICANT CHANGE UP (ref 5–17)
AST SERPL-CCNC: 17 U/L — SIGNIFICANT CHANGE UP (ref 15–37)
BILIRUB SERPL-MCNC: 0.3 MG/DL — SIGNIFICANT CHANGE UP (ref 0.2–1.2)
BUN SERPL-MCNC: 48 MG/DL — HIGH (ref 7–23)
CALCIUM SERPL-MCNC: 7.6 MG/DL — LOW (ref 8.5–10.1)
CHLORIDE SERPL-SCNC: 114 MMOL/L — HIGH (ref 96–108)
CO2 SERPL-SCNC: 23 MMOL/L — SIGNIFICANT CHANGE UP (ref 22–31)
CREAT SERPL-MCNC: 3.3 MG/DL — HIGH (ref 0.5–1.3)
FOLATE SERPL-MCNC: 9.6 NG/ML — SIGNIFICANT CHANGE UP
GLUCOSE SERPL-MCNC: 149 MG/DL — HIGH (ref 70–99)
HCT VFR BLD CALC: 28.2 % — LOW (ref 34.5–45)
HGB BLD-MCNC: 8.9 G/DL — LOW (ref 11.5–15.5)
INR BLD: 1.04 RATIO — SIGNIFICANT CHANGE UP (ref 0.88–1.16)
MAGNESIUM SERPL-MCNC: 2.5 MG/DL — SIGNIFICANT CHANGE UP (ref 1.6–2.6)
MCHC RBC-ENTMCNC: 29.3 PG — SIGNIFICANT CHANGE UP (ref 27–34)
MCHC RBC-ENTMCNC: 31.6 GM/DL — LOW (ref 32–36)
MCV RBC AUTO: 92.8 FL — SIGNIFICANT CHANGE UP (ref 80–100)
NRBC # BLD: 0 /100 WBCS — SIGNIFICANT CHANGE UP (ref 0–0)
PHOSPHATE SERPL-MCNC: 3.1 MG/DL — SIGNIFICANT CHANGE UP (ref 2.5–4.5)
PLATELET # BLD AUTO: 300 K/UL — SIGNIFICANT CHANGE UP (ref 150–400)
POTASSIUM SERPL-MCNC: 4.5 MMOL/L — SIGNIFICANT CHANGE UP (ref 3.5–5.3)
POTASSIUM SERPL-SCNC: 4.5 MMOL/L — SIGNIFICANT CHANGE UP (ref 3.5–5.3)
PROT SERPL-MCNC: 5.6 G/DL — LOW (ref 6–8.3)
PROTHROM AB SERPL-ACNC: 12.1 SEC — SIGNIFICANT CHANGE UP (ref 10.6–13.6)
RBC # BLD: 3.04 M/UL — LOW (ref 3.8–5.2)
RBC # FLD: 16.8 % — HIGH (ref 10.3–14.5)
SODIUM SERPL-SCNC: 142 MMOL/L — SIGNIFICANT CHANGE UP (ref 135–145)
TSH SERPL-MCNC: 2.45 UIU/ML — SIGNIFICANT CHANGE UP (ref 0.36–3.74)
VIT B12 SERPL-MCNC: 1777 PG/ML — HIGH (ref 232–1245)
WBC # BLD: 11.5 K/UL — HIGH (ref 3.8–10.5)
WBC # FLD AUTO: 11.5 K/UL — HIGH (ref 3.8–10.5)

## 2021-06-18 PROCEDURE — 99232 SBSQ HOSP IP/OBS MODERATE 35: CPT

## 2021-06-18 RX ORDER — MECLIZINE HCL 12.5 MG
25 TABLET ORAL ONCE
Refills: 0 | Status: COMPLETED | OUTPATIENT
Start: 2021-06-18 | End: 2021-06-18

## 2021-06-18 RX ORDER — ASPIRIN/CALCIUM CARB/MAGNESIUM 324 MG
1 TABLET ORAL
Qty: 0 | Refills: 0 | DISCHARGE

## 2021-06-18 RX ORDER — MECLIZINE HCL 12.5 MG
25 TABLET ORAL EVERY 8 HOURS
Refills: 0 | Status: DISCONTINUED | OUTPATIENT
Start: 2021-06-18 | End: 2021-06-19

## 2021-06-18 RX ORDER — SODIUM CHLORIDE 9 MG/ML
250 INJECTION INTRAMUSCULAR; INTRAVENOUS; SUBCUTANEOUS ONCE
Refills: 0 | Status: COMPLETED | OUTPATIENT
Start: 2021-06-18 | End: 2021-06-18

## 2021-06-18 RX ORDER — METOPROLOL TARTRATE 50 MG
1 TABLET ORAL
Qty: 14 | Refills: 0
Start: 2021-06-18 | End: 2021-07-01

## 2021-06-18 RX ADMIN — ATORVASTATIN CALCIUM 20 MILLIGRAM(S): 80 TABLET, FILM COATED ORAL at 21:20

## 2021-06-18 RX ADMIN — Medication 2: at 12:10

## 2021-06-18 RX ADMIN — PANTOPRAZOLE SODIUM 40 MILLIGRAM(S): 20 TABLET, DELAYED RELEASE ORAL at 05:50

## 2021-06-18 RX ADMIN — Medication 5 MILLIGRAM(S): at 17:09

## 2021-06-18 RX ADMIN — MAGNESIUM OXIDE 400 MG ORAL TABLET 400 MILLIGRAM(S): 241.3 TABLET ORAL at 11:23

## 2021-06-18 RX ADMIN — Medication 75 MILLIGRAM(S): at 21:21

## 2021-06-18 RX ADMIN — SODIUM CHLORIDE 500 MILLILITER(S): 9 INJECTION INTRAMUSCULAR; INTRAVENOUS; SUBCUTANEOUS at 18:25

## 2021-06-18 RX ADMIN — PREGABALIN 1000 MICROGRAM(S): 225 CAPSULE ORAL at 11:23

## 2021-06-18 RX ADMIN — Medication 25 MILLIGRAM(S): at 17:12

## 2021-06-18 RX ADMIN — Medication 5 MILLIGRAM(S): at 05:50

## 2021-06-18 RX ADMIN — AMLODIPINE BESYLATE 5 MILLIGRAM(S): 2.5 TABLET ORAL at 05:50

## 2021-06-18 RX ADMIN — PANTOPRAZOLE SODIUM 40 MILLIGRAM(S): 20 TABLET, DELAYED RELEASE ORAL at 17:09

## 2021-06-18 NOTE — PROGRESS NOTE ADULT - PROBLEM SELECTOR PLAN 7
Chronic   - Takes rosuvastatin 2.5 mg, non formulary will order atorvastatin 20 mg qd at bedtime

## 2021-06-18 NOTE — PROGRESS NOTE ADULT - TIME BILLING
Chart review, examination, documentation, care coordination and counseling.   Daughter of patient updated at bedside.
Chart review, examination, documentation, care coordination and counseling.   Daughter of patient updated at bedside.
Chart review, examination, documentation, care coordination and counseling.   Daughter of pt updated at bedside

## 2021-06-18 NOTE — DISCHARGE NOTE PROVIDER - NSDCMRMEDTOKEN_GEN_ALL_CORE_FT
amLODIPine 5 mg oral tablet: 1 tab(s) orally once a day  aspirin 81 mg oral tablet: 1 tab(s) orally once a day  Januvia 25 mg oral tablet: 1 tab(s) orally once a day  magnesium gluconate 500 mg oral tablet: 1 tab(s) orally once a day  omeprazole 20 mg oral delayed release capsule: 1 cap(s) orally once a day  paricalcitol 1 mcg oral capsule: 1 tab(s) orally 3 times a week  rosuvastatin 5 mg oral tablet: 2.5 milligram(s) orally once a day  solifenacin 5 mg oral tablet: 1 tab(s) orally once a day  traZODone 150 mg oral tablet: 0.5 tab(s) orally once a day (at bedtime) then at 3AM if with insomnia  Vitamin B12 1000 mcg oral tablet: 1 tab(s) orally once a day   amLODIPine 5 mg oral tablet: 1 tab(s) orally once a day  magnesium gluconate 500 mg oral tablet: 1 tab(s) orally once a day  metoprolol succinate 25 mg oral tablet, extended release: 1 tab(s) orally once a day  omeprazole 20 mg oral delayed release capsule: 1 cap(s) orally once a day  paricalcitol 1 mcg oral capsule: 1 tab(s) orally 3 times a week  rosuvastatin 5 mg oral tablet: 2.5 milligram(s) orally once a day  solifenacin 5 mg oral tablet: 1 tab(s) orally once a day  traZODone 150 mg oral tablet: 0.5 tab(s) orally once a day (at bedtime) then at 3AM if with insomnia  Vitamin B12 1000 mcg oral tablet: 1 tab(s) orally once a day

## 2021-06-18 NOTE — PROGRESS NOTE ADULT - PROBLEM SELECTOR PLAN 3
SHER on CKD Stage IV, creatinine baseline 4.  - Likely prerenal in the setting of active GIB   -  bun / cr 76/4.2   - Avoid nephrotoxic meds  - Nephro following Dr. Cody
SHER on CKD Stage IV, creatinine baseline 4.  - Likely prerenal in the setting of active GIB   - Avoid nephrotoxic meds  - Nephro following Dr. Cody  - creatinine improving

## 2021-06-18 NOTE — DISCHARGE NOTE PROVIDER - CARE PROVIDERS DIRECT ADDRESSES
[email or web address],DirectAddress_Unknown

## 2021-06-18 NOTE — PROGRESS NOTE ADULT - ATTENDING COMMENTS
pt seen and examine today see above plan . palliative care consult .
I personally saw and examined the patient in detail.  I have spoken to the above provider regarding the assessment and plan of care.  I reviewed the above assessment and plan of care, and agree.  I have made changes in the body of the note where appropriate.
No signs of significant ischemia or volume overload. cont current care. trend h/h. gi fu, s/p egd and colon. Further cardiac workup will depend on clinical course.
I saw and examined the patient personally. Spoke with above provider regarding this case. I reviewed the above findings completely.  I agree with the above history, physical, and plan which I have edited where appropriate.     No signs of significant ischemia or volume overload. cont current care. trend h/h. gi fu Further cardiac workup will depend on clinical course.

## 2021-06-18 NOTE — PROGRESS NOTE ADULT - PROBLEM SELECTOR PROBLEM 3
Chronic kidney disease, unspecified CKD stage

## 2021-06-18 NOTE — DISCHARGE NOTE NURSING/CASE MANAGEMENT/SOCIAL WORK - PATIENT PORTAL LINK FT
You can access the FollowMyHealth Patient Portal offered by [facility] by registering at the following website: [email or web address]. By joining [facility]’s FollowMyHealth portal, you will also be able to view your health information using other applications (apps) compatible with our system.

## 2021-06-18 NOTE — PROGRESS NOTE ADULT - PROBLEM SELECTOR PLAN 1
New episode of melena and hematochezia since Saturday ( 5-6 bloody watery diarrhea). Suspected UGI   - admit for acute on chronic anemia. acute blood loss anemia.   - FOBT positive, H/H: 3.8/12.9 ,s/p 4 unit prbc   - s/p pantoprazole 40mg IVP 1x, NS bolus   - NPO , bleeding scan   - continue  iv PPI BID   - H&H tonight   - GI consulted, recommend NM scan to localized bleeding and  possible EGD/ colonoscopy .
New episode of melena and hematochezia since Saturday ( 5-6 bloody watery diarrhea). Suspected UGI   - admit for acute on chronic anemia. acute blood loss anemia.   - FOBT positive, H/H: 3.8/12.9 ,s/p 4 unit prbc   - NM bleeding scan negative, EGD/colonoscopy done 6/16 per GI  - diet resumed per GI  - continue  iv PPI BID   - monitor CBC
New episode of melena and hematochezia since Saturday ( 5-6 bloody watery diarrhea). Suspected UGI   - admit for acute on chronic anemia. acute blood loss anemia.   - FOBT positive, H/H: 3.8/12.9 ,s/p 4 unit prbc   - NM bleeding scan negative, EGD/colonoscopy done 6/16 per GI  - diet resumed per GI  - continue  iv PPI BID   - monitor CBC, hgb stable  today  dc planning, daughter of patient asked to defer DC today, wants pt monitored till tomorrow, Reports  pt has some lightheaded feeling intermittently. Orthostatics checked. f/u labs in AM including tsh,b12,b9, pt appears euvolemic. Pt eval.
New episode of melena and hematochezia since Saturday (5-6 bloody watery diarrhea). Suspected UGI   - admit for acute on chronic anemia. acute blood loss anemia.   - FOBT positive, H/H: 3.8/12.9 ,s/p 4 unit prbc --> hgb/hct 8.9/28.2   - NM bleeding scan negative, EGD/colonoscopy done 6/16 per GI  - diet resumed per GI  - continue IV PPI BID   - monitor CBC, hgb stable  today  dc planning, daughter of patient asked to defer DC today, wants pt monitored till tomorrow, Reports  pt has some lightheaded feeling intermittently. Orthostatics checked. f/u labs in AM including tsh,b12,b9, pt appears euvolemic. Pt eval.

## 2021-06-18 NOTE — DISCHARGE NOTE PROVIDER - PROVIDER TOKENS
PROVIDER:[TOKEN:[[id number]],FOLLOWUP:[1 week]],FREE:[LAST:[Your primary care doctor],PHONE:[(   )    -],FAX:[(   )    -],FOLLOWUP:[1-3 days]],PROVIDER:[TOKEN:[[id number]],FOLLOWUP:[1 week]]

## 2021-06-18 NOTE — DISCHARGE NOTE PROVIDER - HOSPITAL COURSE
HPI:  85 y/o female with PMHx T2DM, CKD stage IV, chronic anemia, HTN, HLD, insomnia, diverticulosis. BIBEMS from home due blood in the stools associated with lightheadedness.  Patient reports having bright red blood with black stools with each bowel movement since Saturday (06/12/21) approximately 5 episodes.  Endorsed feeling severe lightheaded, on Saturday overnight went to bathroom and tried to stand up, lost strength in LE and fell, hit head with bruised over R eye w/o LOC. Denies syncope, nausea,  vomiting, chest pain, palpitations.  Pt has history of chronic anemia, receives Procrit and IV iron last transfusion was 04/2021, last Hgb was 6.4. few weeks ago. Last colonoscopy 10 years ago found to have diverticulosis.   Denies nausea, vomiting, chest pain, SOB, fever, chills, abdominal pain, hematuria, dysuria or other symptoms at this time.     ED course   VS: Temp: 97.5, HR:92, BP: 122/69->107/52, RR: 16, SpO2: 99% RA  Labs significant for FOBT positive, WBC: 12.74 H/H: 3.8/12.9 BUN/creatinine: 87/4.50 AST/ALT: 11/8 GFR: 10 Troponin: 0.533   CT abdomen/pelvis shows: No specific acute inflammatory or obstructive pathology. Diverticulosis coli without acute diverticulitis.  CT head/cervical shows: There is no evidence of skull fracture, intracranial hemorrhage or mass effect. There is no cervical spine fracture.  EKG shows NSR @ 82 bpm left axis deviation  RBBB. QTc 509  Patient received pantoprazole 40mg IVP 1x, Protonix drip, NS bolus 1lt 1x and  2 pRBCs. GI  and nephro consulted      (14 Jun 2021 18:14)    Hospital Course:          Consultants:   GI - Adwoa Barnes  Nephro - Garrett  Cardio - Morales    Patient seen and examined on discharge    REVIEW OF SYSTEMS:  CONSTITUTIONAL: No fever or chills  HEENT:  No headache, no sore throat  RESPIRATORY: No cough, wheezing, or shortness of breath  CARDIOVASCULAR: No chest pain, palpitations  GASTROINTESTINAL: No abd pain, nausea, vomiting, or diarrhea  GENITOURINARY: No dysuria, frequency, or hematuria  NEUROLOGICAL: no focal weakness or dizziness  MUSCULOSKELETAL: no myalgias     Vital Signs Last 24 Hrs  T(C): 36.7 (18 Jun 2021 05:29), Max: 36.7 (17 Jun 2021 11:10)  T(F): 98.1 (18 Jun 2021 05:29), Max: 98.1 (17 Jun 2021 11:10)  HR: 55 (18 Jun 2021 05:29) (55 - 70)  BP: 149/62 (18 Jun 2021 05:29) (119/64 - 151/68)  BP(mean): --  RR: 17 (18 Jun 2021 05:29) (14 - 17)  SpO2: 96% (18 Jun 2021 05:29) (96% - 98%)    PHYSICAL EXAM:  GENERAL: NAD  HEENT:  anicteric, moist mucous membranes  CHEST/LUNG:  CTA b/l, no rales, wheezes, or rhonchi  HEART:  RRR, S1, S2 , +systolic murmur  ABDOMEN:  BS+, soft, nontender, nondistended  EXTREMITIES: no edema, cyanosis, or calf tenderness  NERVOUS SYSTEM: answers questions and follows commands appropriately HPI:  87 y/o female with PMHx T2DM, CKD stage IV, chronic anemia, HTN, HLD, insomnia, diverticulosis. BIBEMS from home due blood in the stools associated with lightheadedness.  Patient reports having bright red blood with black stools with each bowel movement since Saturday (06/12/21) approximately 5 episodes.  Endorsed feeling severe lightheaded, on Saturday overnight went to bathroom and tried to stand up, lost strength in LE and fell, hit head with bruised over R eye w/o LOC. Denies syncope, nausea,  vomiting, chest pain, palpitations.  Pt has history of chronic anemia, receives Procrit and IV iron last transfusion was 04/2021, last Hgb was 6.4. few weeks ago. Last colonoscopy 10 years ago found to have diverticulosis.   Denies nausea, vomiting, chest pain, SOB, fever, chills, abdominal pain, hematuria, dysuria or other symptoms at this time.     ED course   VS: Temp: 97.5, HR:92, BP: 122/69->107/52, RR: 16, SpO2: 99% RA  Labs significant for FOBT positive, WBC: 12.74 H/H: 3.8/12.9 BUN/creatinine: 87/4.50 AST/ALT: 11/8 GFR: 10 Troponin: 0.533   CT abdomen/pelvis shows: No specific acute inflammatory or obstructive pathology. Diverticulosis coli without acute diverticulitis.  CT head/cervical shows: There is no evidence of skull fracture, intracranial hemorrhage or mass effect. There is no cervical spine fracture.  EKG shows NSR @ 82 bpm left axis deviation  RBBB. QTc 509  Patient received pantoprazole 40mg IVP 1x, Protonix drip, NS bolus 1lt 1x and  2 pRBCs. GI  and nephro consulted      (14 Jun 2021 18:14)    Hospital Course:   Patient admitted to tele floor with GI bleed, with +occult and anemia with hemoglobin of 3.8 on admission. Of note, she does have a baseline hx of anemia that has required iron transf in the past. She received total 4 units pRBC transfusion during hospitalization. Nuclear medicine scan done on 6/15 showed no bleed. Pt had EGD/colonoscopy on 6/16 by GI with findings below.  Findings:  1. 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 Internal hemorrhoids, moderate on retroflexion  3. 6mm sessile polyp in cecum, removed via jumbo forceps  4. 9mm sessile polyp in ascending colon, adjacent to prior tattoo site, removed via hot snare and    She continued to PPI BID while inpatient and Hgb improved, also had improved stool. At time of discharge, pt had no diarrhea and was  having brown stools. Patient made aware of pending pathology report, and advised to continue high fiber diet by GI. Also advised she needs outpatient Video capsule endoscopy (daughter also aware).  She also had SHER on CKD on admission that improved with IV fluids. Creatinine improved from 4.5 to 3.3. She was evaluated by Nephro, baseline Creat of about 4 per nephro notes. Advise outpatient follow up.     Consultants:   CECE - Adwoa Barnes  Nephro - Garrett  Cardio - Andrew    Patient seen and examined on discharge    REVIEW OF SYSTEMS:  CONSTITUTIONAL: No fever or chills  HEENT:  No headache, no sore throat  RESPIRATORY: No cough, wheezing, or shortness of breath  CARDIOVASCULAR: No chest pain, palpitations  GASTROINTESTINAL: No abd pain, nausea, vomiting, or diarrhea  GENITOURINARY: No dysuria, frequency, or hematuria  NEUROLOGICAL: no focal weakness or dizziness, denies lightheadedness  MUSCULOSKELETAL: no myalgias     Vital Signs Last 24 Hrs  T(C): 36.7 (18 Jun 2021 05:29), Max: 36.7 (17 Jun 2021 11:10)  T(F): 98.1 (18 Jun 2021 05:29), Max: 98.1 (17 Jun 2021 11:10)  HR: 55 (18 Jun 2021 05:29) (55 - 70)  BP: 149/62 (18 Jun 2021 05:29) (119/64 - 151/68)  BP(mean): --  RR: 17 (18 Jun 2021 05:29) (14 - 17)  SpO2: 96% (18 Jun 2021 05:29) (96% - 98%)    PHYSICAL EXAM:  GENERAL: NAD  HEENT:  anicteric, moist mucous membranes  CHEST/LUNG:  CTA b/l, no rales, wheezes, or rhonchi  HEART:  RRR, S1, S2 , +systolic murmur  ABDOMEN:  BS+, soft, nontender, nondistended  EXTREMITIES: no edema, cyanosis, or calf tenderness  NERVOUS SYSTEM: answers questions and follows commands appropriately    Time spent on discharge - 45mins HPI:  87 y/o female with PMHx T2DM, CKD stage IV, chronic anemia, HTN, HLD, insomnia, diverticulosis. BIBEMS from home due blood in the stools associated with lightheadedness.  Patient reports having bright red blood with black stools with each bowel movement since Saturday (06/12/21) approximately 5 episodes.  Endorsed feeling severe lightheaded, on Saturday overnight went to bathroom and tried to stand up, lost strength in LE and fell, hit head with bruised over R eye w/o LOC. Denies syncope, nausea,  vomiting, chest pain, palpitations.  Pt has history of chronic anemia, receives Procrit and IV iron last transfusion was 04/2021, last Hgb was 6.4. few weeks ago. Last colonoscopy 10 years ago found to have diverticulosis.   Denies nausea, vomiting, chest pain, SOB, fever, chills, abdominal pain, hematuria, dysuria or other symptoms at this time.     ED course   VS: Temp: 97.5, HR:92, BP: 122/69->107/52, RR: 16, SpO2: 99% RA  Labs significant for FOBT positive, WBC: 12.74 H/H: 3.8/12.9 BUN/creatinine: 87/4.50 AST/ALT: 11/8 GFR: 10 Troponin: 0.533   CT abdomen/pelvis shows: No specific acute inflammatory or obstructive pathology. Diverticulosis coli without acute diverticulitis.  CT head/cervical shows: There is no evidence of skull fracture, intracranial hemorrhage or mass effect. There is no cervical spine fracture.  EKG shows NSR @ 82 bpm left axis deviation  RBBB. QTc 509  Patient received pantoprazole 40mg IVP 1x, Protonix drip, NS bolus 1lt 1x and  2 pRBCs. GI  and nephro consulted      (14 Jun 2021 18:14)    Hospital Course:   Patient admitted to tele floor with GI bleed, with +occult and anemia with hemoglobin of 3.8 on admission. Of note, she does have a baseline hx of anemia that has required iron transf in the past. She received total 4 units pRBC transfusion during hospitalization. Nuclear medicine scan done on 6/15 showed no bleed. Pt had EGD/colonoscopy on 6/16 by GI with findings below.  Findings:  1. 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 Internal hemorrhoids, moderate on retroflexion  3. 6mm sessile polyp in cecum, removed via jumbo forceps  4. 9mm sessile polyp in ascending colon, adjacent to prior tattoo site, removed via hot snare and    She continued to PPI BID while inpatient and Hgb improved, also had improved stool. At time of discharge, pt had no diarrhea and was  having brown stools. Patient made aware of pending pathology report, and advised to continue high fiber diet by GI. Also advised she needs outpatient Video capsule endoscopy (daughter also aware).  She also had SHER on CKD on admission that improved with IV fluids. Creatinine improved from 4.5 to 3.3. She was evaluated by Nephro, baseline Creat of about 4 per nephro notes. Advise outpatient follow up.   PT eval: Home, no skilled needs    Consultants:   CECE - Adwoa Barnes  Nephro - Garrett  Cardio - Andrew    Patient seen and examined on discharge    REVIEW OF SYSTEMS:  CONSTITUTIONAL: No fever or chills  HEENT:  No headache, no sore throat  RESPIRATORY: No cough, wheezing, or shortness of breath  CARDIOVASCULAR: No chest pain, palpitations  GASTROINTESTINAL: No abd pain, nausea, vomiting, or diarrhea  GENITOURINARY: No dysuria, frequency, or hematuria  NEUROLOGICAL: no focal weakness or dizziness, denies lightheadedness  MUSCULOSKELETAL: no myalgias     Vital Signs Last 24 Hrs  T(C): 36.7 (18 Jun 2021 05:29), Max: 36.7 (17 Jun 2021 11:10)  T(F): 98.1 (18 Jun 2021 05:29), Max: 98.1 (17 Jun 2021 11:10)  HR: 55 (18 Jun 2021 05:29) (55 - 70)  BP: 149/62 (18 Jun 2021 05:29) (119/64 - 151/68)  BP(mean): --  RR: 17 (18 Jun 2021 05:29) (14 - 17)  SpO2: 96% (18 Jun 2021 05:29) (96% - 98%)    PHYSICAL EXAM:  GENERAL: NAD  HEENT:  anicteric, moist mucous membranes  CHEST/LUNG:  CTA b/l, no rales, wheezes, or rhonchi  HEART:  RRR, S1, S2 , +systolic murmur  ABDOMEN:  BS+, soft, nontender, nondistended  EXTREMITIES: no edema, cyanosis, or calf tenderness  NERVOUS SYSTEM: answers questions and follows commands appropriately  GAIT: pt ambulating well on floor.     Time spent on discharge - 45mins HPI:  87 y/o female with PMHx T2DM, CKD stage IV, chronic anemia, HTN, HLD, insomnia, diverticulosis. BIBEMS from home due blood in the stools associated with lightheadedness.  Patient reports having bright red blood with black stools with each bowel movement since Saturday (06/12/21) approximately 5 episodes.  Endorsed feeling severe lightheaded, on Saturday overnight went to bathroom and tried to stand up, lost strength in LE and fell, hit head with bruised over R eye w/o LOC. Denies syncope, nausea,  vomiting, chest pain, palpitations.  Pt has history of chronic anemia, receives Procrit and IV iron last transfusion was 04/2021, last Hgb was 6.4. few weeks ago. Last colonoscopy 10 years ago found to have diverticulosis.   Denies nausea, vomiting, chest pain, SOB, fever, chills, abdominal pain, hematuria, dysuria or other symptoms at this time.     ED course   VS: Temp: 97.5, HR:92, BP: 122/69->107/52, RR: 16, SpO2: 99% RA  Labs significant for FOBT positive, WBC: 12.74 H/H: 3.8/12.9 BUN/creatinine: 87/4.50 AST/ALT: 11/8 GFR: 10 Troponin: 0.533   CT abdomen/pelvis shows: No specific acute inflammatory or obstructive pathology. Diverticulosis coli without acute diverticulitis.  CT head/cervical shows: There is no evidence of skull fracture, intracranial hemorrhage or mass effect. There is no cervical spine fracture.  EKG shows NSR @ 82 bpm left axis deviation  RBBB. QTc 509  Patient received pantoprazole 40mg IVP 1x, Protonix drip, NS bolus 1lt 1x and  2 pRBCs. GI  and nephro consulted      (14 Jun 2021 18:14)    Hospital Course:   Patient admitted to tele floor with GI bleed, with +occult and anemia with hemoglobin of 3.8 on admission. Of note, she does have a baseline hx of anemia that has required iron transf in the past. She received total 4 units pRBC transfusion during hospitalization. Nuclear medicine scan done on 6/15 showed no bleed. Pt had EGD/colonoscopy on 6/16 by GI with findings below.  Findings:  1. 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 Internal hemorrhoids, moderate on retroflexion  3. 6mm sessile polyp in cecum, removed via jumbo forceps  4. 9mm sessile polyp in ascending colon, adjacent to prior tattoo site, removed via hot snare and    She continued to PPI BID while inpatient and Hgb improved, also had improved stool. At time of discharge, pt had no diarrhea and was  having brown stools. Patient made aware of pending pathology report, and advised to continue high fiber diet by GI. Also advised she needs outpatient Video capsule endoscopy (daughter also aware).  She also had SHER on CKD on admission that improved with IV fluids. Creatinine improved from 4.5 to 3.3. She was evaluated by Nephro, baseline Creat of about 4 per nephro notes. Advise outpatient follow up.   Diabetes: Pt has hx of diabetes, A1c 5.4% (although could also be low due to anemia). Blood glucose range is usually less than 180. Pt had mild low gluc to 95 on 6/18, complained of lightheadedness, that resolved with apple juice and repeat  gluc 116. Discharge deferred to 6/19 to monitor patient. B/L carotid dopplers also ordered. For now, will DC Dexteruvia on discharge and advise outpatient follow up. Mild leukocytosis, wbc 12 noted on 6/19. Pt has no infectious signs or symptoms. Procalcitonin low. Leukocytosis likely adrenal response to hypoglycemia. Advised recheck WBC outpatient.     PT eval: Home, no skilled needs    Consultants:   CECE - Adwoa Barnes  Nephro - Garrett  Cardio - Morales    Patient seen and examined on discharge    REVIEW OF SYSTEMS:  CONSTITUTIONAL: No fever or chills  HEENT:  No headache, no sore throat  RESPIRATORY: No cough, wheezing, or shortness of breath  CARDIOVASCULAR: No chest pain, palpitations  GASTROINTESTINAL: No abd pain, nausea, vomiting, or diarrhea  GENITOURINARY: No dysuria, frequency, or hematuria  NEUROLOGICAL: no focal weakness or dizziness, denies lightheadedness  MUSCULOSKELETAL: no myalgias     Vital Signs Last 24 Hrs  T(C): 36.9 (19 Jun 2021 04:25), Max: 37.1 (18 Jun 2021 19:03)  T(F): 98.5 (19 Jun 2021 04:25), Max: 98.7 (18 Jun 2021 19:03)  HR: 76 (19 Jun 2021 04:25) (56 - 76)  BP: 165/76 (19 Jun 2021 04:25) (131/61 - 165/76)  BP(mean): --  RR: 17 (19 Jun 2021 04:25) (17 - 18)  SpO2: 94% (19 Jun 2021 04:25) (94% - 98%)    PHYSICAL EXAM:  GENERAL: NAD  HEENT:  anicteric, moist mucous membranes  CHEST/LUNG:  CTA b/l, no rales, wheezes, or rhonchi  HEART:  RRR, S1, S2 , +systolic murmur  ABDOMEN:  BS+, soft, nontender, nondistended  EXTREMITIES: no edema, cyanosis, or calf tenderness  NERVOUS SYSTEM: answers questions and follows commands appropriately  GAIT: pt ambulating well on floor.     Time spent on discharge - 45mins

## 2021-06-18 NOTE — PROGRESS NOTE ADULT - PROBLEM SELECTOR PLAN 2
Acute on Chronic normocytic anemia, component of anemia of chronic disease now with acute blood loss anemia.   -Hb 6.4 few couple of weeks ago   - H/H: 3.8/12.9 - hb 8.9 today   - s/p 4 unit  pRBCs   - Transfused for Hb< 7   - Pt received IV infusion iron (last 04/01/2021) and paricalcitol 1mcg 3 times a week, vitamin B12 1000mg qd, magnesium 500mg qd   - Trend CBC
Acute on Chronic normocytic anemia, component of anemia of chronic disease now with acute blood loss anemia.   -Hb 6.4 few couple of weeks ago   - s/p 4 unit  pRBCs   - Transfused for Hb< 7   - Pt received IV infusion iron (last 04/01/2021) and paricalcitol 1mcg 3 times a week, vitamin B12 1000mg qd, magnesium 500mg qd   - Trend CBC

## 2021-06-18 NOTE — DISCHARGE NOTE PROVIDER - NSDCCPCAREPLAN_GEN_ALL_CORE_FT
PRINCIPAL DISCHARGE DIAGNOSIS  Diagnosis: Gastrointestinal hemorrhage, unspecified gastrointestinal hemorrhage type  Assessment and Plan of Treatment: You were admitted forsuspected bleeding in colon. You had a nuclear medicine scan that showed no bleed, and an EGD/colonoscopy on 6/16 that showed polyps. Biopsy was done, Pathology is PENDING - please followup on this. This is VERY IMPORTANT. You may follow up with GI doctor, Dr Adwoa Barnes about the pathology report.   - Hemoglobin was 8.9 on discharge day  - please check hemoglobin levels in 2-3 days. This is important.  - Please HOLD use of Aspirin for now. If your hemoglobin remains stable, and you no longer have bloody or black stools, then discuss with your doctor about resuming Aspirin.  -Please avoid use of NSAIDs, like ibuprofen, aleve.      SECONDARY DISCHARGE DIAGNOSES  Diagnosis: Anemia  Assessment and Plan of Treatment: See above    Diagnosis: Chronic kidney disease, unspecified CKD stage  Assessment and Plan of Treatment: You have Kidney disease  Creatinine improved during admission.   Please  follow up with your nephrologist.   You may follow up with Dr Jaclyn Chao (nephrology)    Diagnosis: Elevated troponin  Assessment and Plan of Treatment: You have elevated troponin on admission but it decreased whosing improvement  You were seen by cardiology  This is likely related to lossing a lot of blood  Please follow up with your cardiologist    Diagnosis: Diabetes  Assessment and Plan of Treatment: You have diabetes for which you take Januvia  Your A1c in the hospital was 5.4%. Although keep in mind that anemia can affect the A1c result.   Please continue your medication,Januvia  It is important that you follow up with your primary doctor to continue to monitor your blood glucose.    Diagnosis: HTN (hypertension)  Assessment and Plan of Treatment: Continue your medication amlodipine, metoprolol  Follow up with your doctor     PRINCIPAL DISCHARGE DIAGNOSIS  Diagnosis: Gastrointestinal hemorrhage, unspecified gastrointestinal hemorrhage type  Assessment and Plan of Treatment: You were admitted forsuspected bleeding in colon. You had a nuclear medicine scan that showed no bleed, and an EGD/colonoscopy on 6/16 that showed polyps. Biopsy was done, Pathology is PENDING - please followup on this. This is VERY IMPORTANT. You may follow up with GI doctor, Dr Adwoa Barnes about the pathology report.   - Hemoglobin was 8.8 on discharge day  - please check hemoglobin levels in 2-3 days. This is important.  - Please HOLD use of Aspirin for now. If your hemoglobin remains stable, and you no longer have bloody or black stools, then discuss with your doctor about resuming Aspirin.  -Please avoid use of NSAIDs, like ibuprofen, aleve.      SECONDARY DISCHARGE DIAGNOSES  Diagnosis: Anemia  Assessment and Plan of Treatment: See above    Diagnosis: Chronic kidney disease, unspecified CKD stage  Assessment and Plan of Treatment: You have Kidney disease  Creatinine improved during admission.   Please  follow up with your nephrologist.   You may follow up with Dr Jaclyn Chao (nephrology)    Diagnosis: Elevated troponin  Assessment and Plan of Treatment: You have elevated troponin on admission but it decreased whosing improvement  You were seen by cardiology  This is likely related to lossing a lot of blood  Please follow up with your cardiologist    Diagnosis: Diabetes  Assessment and Plan of Treatment: You have diabetes for which you take Januvia  Your A1c in the hospital was 5.4%. Although keep in mind that anemia can affect the A1c result.   You had glucose of 95 in hospital on 6/18 and felt lightheaded. Please HOLD use of Januvia for now and followup with your doctor and they can advise you on when to resume.   It is important that you follow up with your primary doctor to continue to monitor your blood glucose.    Diagnosis: HTN (hypertension)  Assessment and Plan of Treatment: Continue your medication amlodipine, metoprolol  Follow up with your doctor    Diagnosis: Leukocytosis  Assessment and Plan of Treatment: You had mild WBC increase (wbc 12) on discharge day. You have no signs of an infection. This is likely your bodys response to the low glucose event on 6/18. Please have your doctor recheck your COMPLETE BLOOD count incluging WBC level at nest visit.

## 2021-06-18 NOTE — PROGRESS NOTE ADULT - PROBLEM SELECTOR PLAN 8
Chronic   - Takes Trazadone 75mcg at bedtime and 75mcg at 3am PRN for insomnia,

## 2021-06-18 NOTE — PROGRESS NOTE ADULT - SUBJECTIVE AND OBJECTIVE BOX
Chief Complaint:  Patient is a 86y old  Female who presents with a chief complaint of active GIB (2021 09:22)      Interval Events:     Hospital Medications:  amLODIPine   Tablet 5 milliGRAM(s) Oral daily  atorvastatin 20 milliGRAM(s) Oral at bedtime  cyanocobalamin 1000 MICROGram(s) Oral daily  dextrose 40% Gel 15 Gram(s) Oral once  dextrose 5%. 1000 milliLiter(s) IV Continuous <Continuous>  dextrose 5%. 1000 milliLiter(s) IV Continuous <Continuous>  dextrose 50% Injectable 25 Gram(s) IV Push once  dextrose 50% Injectable 12.5 Gram(s) IV Push once  dextrose 50% Injectable 25 Gram(s) IV Push once  epoetin chanell-epbx (RETACRIT) Injectable 62411 Unit(s) SubCutaneous <User Schedule>  glucagon  Injectable 1 milliGRAM(s) IntraMuscular once  insulin lispro (ADMELOG) corrective regimen sliding scale   SubCutaneous Before meals and at bedtime  magnesium oxide 400 milliGRAM(s) Oral daily  metoprolol succinate ER 25 milliGRAM(s) Oral daily  oxybutynin 5 milliGRAM(s) Oral two times a day  pantoprazole  Injectable 40 milliGRAM(s) IV Push two times a day  traZODone 75 milliGRAM(s) Oral at bedtime  traZODone 75 milliGRAM(s) Oral <User Schedule> PRN        PHYSICAL EXAM:   Vital Signs:  Vital Signs Last 24 Hrs  T(C): 36.7 (2021 05:29), Max: 36.7 (2021 20:06)  T(F): 98.1 (2021 05:29), Max: 98.1 (2021 05:29)  HR: 55 (2021 05:29) (55 - 55)  BP: 149/62 (2021 05:29) (127/62 - 149/62)  BP(mean): --  RR: 17 (2021 05:29) (17 - 17)  SpO2: 96% (2021 05:29) (96% - 96%)  Daily     Daily Weight in k.2 (2021 05:29)    GENERAL:  Appears stated age,  no distress  HEENT:   sclera -anicteric  CHEST:   no increased effort, breath sounds clear  HEART:  Regular rhythm, S1, S2,   ABDOMEN:  Soft, non-tender, non-distended, normoactive bowel sounds,    EXTREMITIES:  no cyanosis, clubbing or edema  SKIN:  No rash/no jaundice   NEURO:  Alert, oriented    LABS:                        8.9    11.50 )-----------( 300      ( 2021 07:12 )             28.2     Mean Cell Volume: 92.8 fl (- @ 07:12)        142  |  114<H>  |  48<H>  ----------------------------<  149<H>  4.5   |  23  |  3.30<H>    Ca    7.6<L>      2021 07:12  Phos  3.1       Mg     2.5         TPro  5.6<L>  /  Alb  2.6<L>  /  TBili  0.3  /  DBili  x   /  AST  17  /  ALT  15  /  AlkPhos  41  18    LIVER FUNCTIONS - ( 2021 07:12 )  Alb: 2.6 g/dL / Pro: 5.6 g/dL / ALK PHOS: 41 U/L / ALT: 15 U/L / AST: 17 U/L / GGT: x           PT/INR - ( 2021 07:12 )   PT: 12.1 sec;   INR: 1.04 ratio                                     8.9    11.50 )-----------( 300      ( 2021 07:12 )             28.2                         8.7    x     )-----------( x        ( 2021 13:51 )             26.9                         8.8    10.01 )-----------( 289      ( 2021 06:30 )             27.2                         9.9    8.97  )-----------( 337      ( 2021 06:53 )             30.5                         10.0   x     )-----------( x        ( 15 Frandy 2021 20:18 )             30.4     Imaging:

## 2021-06-18 NOTE — PROGRESS NOTE ADULT - PROBLEM SELECTOR PLAN 9
DVT ppx: improve score 1. Holding AC pharmacologic in the setting of active GIB. SCDs   Fall precautions     IMPROVE VTE Individual Risk Assessment  RISK                                                                Points  [  ] Previous VTE                                                  3  [  ] Thrombophilia                                               2  [  ] Lower limb paralysis                                      2        (unable to hold up >15 seconds)    [  ] Current Cancer                                              2         (within 6 months)  [  ] Immobilization > 24 hrs                                1  [  ] ICU/CCU stay > 24 hours                              1  [ x ] Age > 60                                                      1  IMPROVE VTE Score ______1___

## 2021-06-18 NOTE — PROGRESS NOTE ADULT - SUBJECTIVE AND OBJECTIVE BOX
MONIKA AGEE  86y  Female    Patient is a 86y old  Female who presents with a chief complaint of active GIB (18 Jun 2021 13:21)    awake and alert  denies any chest pain or shortness of breath.    PAST MEDICAL & SURGICAL HISTORY:  Hypertension    Hyperlipemia    Diabetes Mellitus Type II    GERD (Gastroesophageal Reflux Disease)    Irritable bowel syndrome with diarrhea    Orthostatic hypotension    Chronic kidney disease, unspecified CKD stage    Anemia    Rectal bleeding    Diverticulosis    Surgery, Elective  left arm surgery as child x 2    S/P cataract surgery  left eye            PHYSICAL EXAM:    T(C): 36.7 (06-18-21 @ 05:29), Max: 36.7 (06-17-21 @ 20:06)  HR: 55 (06-18-21 @ 05:29) (55 - 55)  BP: 149/62 (06-18-21 @ 05:29) (127/62 - 149/62)  RR: 17 (06-18-21 @ 05:29) (17 - 17)  SpO2: 96% (06-18-21 @ 05:29) (96% - 96%)  Wt(kg): --    I&O's Detail    17 Jun 2021 07:01  -  18 Jun 2021 07:00  --------------------------------------------------------  IN:    Oral Fluid: 240 mL  Total IN: 240 mL    OUT:    Voided (mL): 700 mL  Total OUT: 700 mL    Total NET: -460 mL          Respiratory: clear anteriorly, decreased BS at bases  Cardiovascular: S1 S2  Gastrointestinal: soft NT ND +BS  Extremities: edema   Neuro: Awake and alert    MEDICATIONS  (STANDING):  amLODIPine   Tablet 5 milliGRAM(s) Oral daily  atorvastatin 20 milliGRAM(s) Oral at bedtime  cyanocobalamin 1000 MICROGram(s) Oral daily  dextrose 40% Gel 15 Gram(s) Oral once  dextrose 5%. 1000 milliLiter(s) (50 mL/Hr) IV Continuous <Continuous>  dextrose 5%. 1000 milliLiter(s) (100 mL/Hr) IV Continuous <Continuous>  dextrose 50% Injectable 25 Gram(s) IV Push once  dextrose 50% Injectable 12.5 Gram(s) IV Push once  dextrose 50% Injectable 25 Gram(s) IV Push once  epoetin chanell-epbx (RETACRIT) Injectable 26156 Unit(s) SubCutaneous <User Schedule>  glucagon  Injectable 1 milliGRAM(s) IntraMuscular once  insulin lispro (ADMELOG) corrective regimen sliding scale   SubCutaneous Before meals and at bedtime  magnesium oxide 400 milliGRAM(s) Oral daily  metoprolol succinate ER 25 milliGRAM(s) Oral daily  oxybutynin 5 milliGRAM(s) Oral two times a day  pantoprazole  Injectable 40 milliGRAM(s) IV Push two times a day  traZODone 75 milliGRAM(s) Oral at bedtime    MEDICATIONS  (PRN):  traZODone 75 milliGRAM(s) Oral <User Schedule> PRN insomnia                            8.9    11.50 )-----------( 300      ( 18 Jun 2021 07:12 )             28.2       06-18    142  |  114<H>  |  48<H>  ----------------------------<  149<H>  4.5   |  23  |  3.30<H>    Ca    7.6<L>      18 Jun 2021 07:12  Phos  3.1     06-18  Mg     2.5     06-18    TPro  5.6<L>  /  Alb  2.6<L>  /  TBili  0.3  /  DBili  x   /  AST  17  /  ALT  15  /  AlkPhos  41  06-18      Creatinine Trend: Creatinine Trend: 3.30<--, 3.30<--, 3.80<--, 4.20<--, 4.30<--, 4.50<--

## 2021-06-18 NOTE — PROGRESS NOTE ADULT - PROBLEM SELECTOR PLAN 4
Troponin leak in the setting of active GIB - demand ischemia - trops downtrended  - Asymptomatic, pt stable, on tele  - EKG shows NSR @ 82 bpm left axis deviation  RBBB. QTc 509  - Avoid meds that prolong QTc interval   - cardio patito gp aware and following case
Troponin leak in the setting of active GIB - demand ischemia   - Asymptomatic   - EKG shows NSR @ 82 bpm left axis deviation  RBBB. QTc 509  - Avoid meds that prolong QTc interval   -  cardio patito gp aware
Troponin leak in the setting of active GIB - demand ischemia - trops downtrended  - Asymptomatic, pt stable, on tele  - EKG shows NSR @ 82 bpm left axis deviation  RBBB. QTc 509  - Avoid meds that prolong QTc interval   - cardio patito gp aware and following case
Troponin leak in the setting of active GIB - demand ischemia   - Asymptomatic, pt stable, on tele  - EKG shows NSR @ 82 bpm left axis deviation  RBBB. QTc 509  - Avoid meds that prolong QTc interval   - cardio patito gp aware and following case

## 2021-06-18 NOTE — DISCHARGE NOTE PROVIDER - NSDCFUADDAPPT_GEN_ALL_CORE_FT
Please make appointment with your primary doctor for Monday 6/21 and get CBC done  Complete blood count

## 2021-06-18 NOTE — PROGRESS NOTE ADULT - SUBJECTIVE AND OBJECTIVE BOX
Patient is a 86y old  Female who presents with a chief complaint of active GIB (18 Jun 2021 14:49)    INTERVAL HPI/OVERNIGHT EVENTS: Patient seen and examined at bedside. No overnight events occurred. Patient  feels well this morning, ready  for discharge.   Later in afternoon when daughter in house to , pt reports feeling "woozy", like head is not clear, asked to be monitored overnight.     MEDICATIONS  (STANDING):  amLODIPine   Tablet 5 milliGRAM(s) Oral daily  atorvastatin 20 milliGRAM(s) Oral at bedtime  cyanocobalamin 1000 MICROGram(s) Oral daily  dextrose 40% Gel 15 Gram(s) Oral once  dextrose 5%. 1000 milliLiter(s) (50 mL/Hr) IV Continuous <Continuous>  dextrose 5%. 1000 milliLiter(s) (100 mL/Hr) IV Continuous <Continuous>  dextrose 50% Injectable 25 Gram(s) IV Push once  dextrose 50% Injectable 12.5 Gram(s) IV Push once  dextrose 50% Injectable 25 Gram(s) IV Push once  epoetin chanell-epbx (RETACRIT) Injectable 27857 Unit(s) SubCutaneous <User Schedule>  glucagon  Injectable 1 milliGRAM(s) IntraMuscular once  insulin lispro (ADMELOG) corrective regimen sliding scale   SubCutaneous Before meals and at bedtime  magnesium oxide 400 milliGRAM(s) Oral daily  metoprolol succinate ER 25 milliGRAM(s) Oral daily  oxybutynin 5 milliGRAM(s) Oral two times a day  pantoprazole  Injectable 40 milliGRAM(s) IV Push two times a day  sodium chloride 0.9% Bolus 250 milliLiter(s) IV Bolus once  traZODone 75 milliGRAM(s) Oral at bedtime    MEDICATIONS  (PRN):  meclizine 25 milliGRAM(s) Oral every 8 hours PRN Dizziness  traZODone 75 milliGRAM(s) Oral <User Schedule> PRN insomnia      Allergies    No Known Allergies    Intolerances    REVIEW OF SYSTEMS:  CONSTITUTIONAL: No fever or chills  HEENT:  No headache, no sore throat  RESPIRATORY: No cough, wheezing, or shortness of breath  CARDIOVASCULAR: No chest pain, palpitations  GASTROINTESTINAL: No abd pain, nausea, vomiting, or diarrhea  GENITOURINARY: No dysuria, frequency, or hematuria  NEUROLOGICAL: no focal weakness or dizziness  MUSCULOSKELETAL: no myalgias     Vital Signs Last 24 Hrs  T(C): 36.4 (18 Jun 2021 17:51), Max: 36.7 (17 Jun 2021 20:06)  T(F): 97.6 (18 Jun 2021 17:51), Max: 98.1 (18 Jun 2021 05:29)  HR: 57 (18 Jun 2021 17:51) (55 - 57)  BP: 156/74 (18 Jun 2021 17:51) (127/62 - 156/74)  BP(mean): --  RR: 18 (18 Jun 2021 17:51) (17 - 18)  SpO2: 97% (18 Jun 2021 17:51) (96% - 98%)    PHYSICAL EXAM:  GENERAL: NAD  HEENT:  anicteric, moist mucous membranes  CHEST/LUNG:  CTA b/l, no rales, wheezes, or rhonchi  HEART:  RRR, S1, S2  ABDOMEN:  BS+, soft, nontender, nondistended  EXTREMITIES: no edema, cyanosis, or calf tenderness  NERVOUS SYSTEM: answers questions and follows commands appropriately    LABS:                        8.9    11.50 )-----------( 300      ( 18 Jun 2021 07:12 )             28.2     CBC Full  -  ( 18 Jun 2021 07:12 )  WBC Count : 11.50 K/uL  Hemoglobin : 8.9 g/dL  Hematocrit : 28.2 %  Platelet Count - Automated : 300 K/uL  Mean Cell Volume : 92.8 fl  Mean Cell Hemoglobin : 29.3 pg  Mean Cell Hemoglobin Concentration : 31.6 gm/dL  Auto Neutrophil # : x  Auto Lymphocyte # : x  Auto Monocyte # : x  Auto Eosinophil # : x  Auto Basophil # : x  Auto Neutrophil % : x  Auto Lymphocyte % : x  Auto Monocyte % : x  Auto Eosinophil % : x  Auto Basophil % : x    18 Jun 2021 07:12    142    |  114    |  48     ----------------------------<  149    4.5     |  23     |  3.30     Ca    7.6        18 Jun 2021 07:12  Phos  3.1       18 Jun 2021 07:12  Mg     2.5       18 Jun 2021 07:12    TPro  5.6    /  Alb  2.6    /  TBili  0.3    /  DBili  x      /  AST  17     /  ALT  15     /  AlkPhos  41     18 Jun 2021 07:12    PT/INR - ( 18 Jun 2021 07:12 )   PT: 12.1 sec;   INR: 1.04 ratio             CAPILLARY BLOOD GLUCOSE      POCT Blood Glucose.: 116 mg/dL (18 Jun 2021 16:04)  POCT Blood Glucose.: 95 mg/dL (18 Jun 2021 14:49)  POCT Blood Glucose.: 230 mg/dL (18 Jun 2021 12:04)  POCT Blood Glucose.: 145 mg/dL (18 Jun 2021 07:52)  POCT Blood Glucose.: 167 mg/dL (17 Jun 2021 21:31)          RADIOLOGY & ADDITIONAL TESTS:    Personally reviewed.     Consultant(s) Notes Reviewed:  [x] YES  [ ] NO

## 2021-06-18 NOTE — DISCHARGE NOTE PROVIDER - NSDCHHPTASSISTHOME_GEN_ALL_CORE
-initiate PO fluconazole  -await the urine culture sensivities Patient Needs Assistance to Leave Residence...

## 2021-06-18 NOTE — PROGRESS NOTE ADULT - SUBJECTIVE AND OBJECTIVE BOX
[facility] -- Twila Encarnacion, Noah Condon Pannella, Patel, Savella  Office # [phone]      Follow Up:    htn hld lightheadedness   Subjective/Observations:   No events overnight resting comfortably in bed.  No complaints of chest pain, dyspnea, or palpitations reported. No signs of orthopnea or PND.      REVIEW OF SYSTEMS: All other review of systems is negative unless indicated above    PAST MEDICAL & SURGICAL HISTORY:  Hypertension    Hyperlipemia    Diabetes Mellitus Type II    GERD (Gastroesophageal Reflux Disease)    Irritable bowel syndrome with diarrhea    Orthostatic hypotension    Chronic kidney disease, unspecified CKD stage    Anemia    Rectal bleeding    Diverticulosis    Surgery, Elective  left arm surgery as child x 2    S/P cataract surgery  left eye        MEDICATIONS  (STANDING):  amLODIPine   Tablet 5 milliGRAM(s) Oral daily  atorvastatin 20 milliGRAM(s) Oral at bedtime  cyanocobalamin 1000 MICROGram(s) Oral daily  dextrose 40% Gel 15 Gram(s) Oral once  dextrose 5%. 1000 milliLiter(s) (50 mL/Hr) IV Continuous <Continuous>  dextrose 5%. 1000 milliLiter(s) (100 mL/Hr) IV Continuous <Continuous>  dextrose 50% Injectable 25 Gram(s) IV Push once  dextrose 50% Injectable 12.5 Gram(s) IV Push once  dextrose 50% Injectable 25 Gram(s) IV Push once  epoetin chanell-epbx (RETACRIT) Injectable 65672 Unit(s) SubCutaneous <User Schedule>  glucagon  Injectable 1 milliGRAM(s) IntraMuscular once  insulin lispro (ADMELOG) corrective regimen sliding scale   SubCutaneous Before meals and at bedtime  magnesium oxide 400 milliGRAM(s) Oral daily  metoprolol succinate ER 25 milliGRAM(s) Oral daily  oxybutynin 5 milliGRAM(s) Oral two times a day  pantoprazole  Injectable 40 milliGRAM(s) IV Push two times a day  traZODone 75 milliGRAM(s) Oral at bedtime    MEDICATIONS  (PRN):  traZODone 75 milliGRAM(s) Oral <User Schedule> PRN insomnia      Allergies    No Known Allergies    Intolerances        Vital Signs Last 24 Hrs  T(C): 36.7 (18 Jun 2021 05:29), Max: 36.7 (17 Jun 2021 11:10)  T(F): 98.1 (18 Jun 2021 05:29), Max: 98.1 (17 Jun 2021 11:10)  HR: 55 (18 Jun 2021 05:29) (55 - 70)  BP: 149/62 (18 Jun 2021 05:29) (119/64 - 151/68)  BP(mean): --  RR: 17 (18 Jun 2021 05:29) (14 - 17)  SpO2: 96% (18 Jun 2021 05:29) (96% - 98%)    I&O's Summary    17 Jun 2021 07:01  -  18 Jun 2021 07:00  --------------------------------------------------------  IN: 240 mL / OUT: 700 mL / NET: -460 mL          PHYSICAL EXAM:  TELE: Sb 60-80  Constitutional: NAD, awake and alert, well-developed, pale   HEENT: Moist Mucous Membranes, Anicteric  Pulmonary: Non-labored, breath sounds are clear bilaterally, No wheezing, crackles or rhonchi  Cardiovascular: Regular, S1 and S2 nl, murmur   Gastrointestinal: Bowel Sounds present, soft, nontender.   Lymph: No lymphadenopathy. No peripheral edema.  Skin: No visible rashes or ulcers.  Psych:  Mood & affect appropriate    LABS: All Labs Reviewed:                        8.9    11.50 )-----------( 300      ( 18 Jun 2021 07:12 )             28.2                         8.7    x     )-----------( x        ( 17 Jun 2021 13:51 )             26.9                         8.8    10.01 )-----------( 289      ( 17 Jun 2021 06:30 )             27.2     18 Jun 2021 07:12    142    |  114    |  48     ----------------------------<  149    4.5     |  23     |  3.30   17 Jun 2021 06:30    143    |  111    |  50     ----------------------------<  133    4.2     |  24     |  3.30   16 Jun 2021 06:53    144    |  110    |  63     ----------------------------<  147    3.7     |  20     |  3.80     Ca    7.6        18 Jun 2021 07:12  Ca    7.3        17 Jun 2021 06:30  Ca    8.0        16 Jun 2021 06:53  Phos  3.1       18 Jun 2021 07:12  Mg     2.5       18 Jun 2021 07:12    TPro  5.6    /  Alb  2.6    /  TBili  0.3    /  DBili  x      /  AST  17     /  ALT  15     /  AlkPhos  41     18 Jun 2021 07:12    PT/INR - ( 18 Jun 2021 07:12 )   PT: 12.1 sec;   INR: 1.04 ratio           CARDIAC MARKERS ( 17 Jun 2021 06:30 )  .416 ng/mL / x     / 126 U/L / x     / 2.5 ng/mL    Patient name: MONIKA AGEE  YOB: 1935   Age: 82 (F)   MR#: [id number]  Study Date: 11/20/2017  Location: [address] [place], [place] [id number]: Katie Acevedo RDCS  Study quality: Technically fair  Referring Physician: Shaun Cerda MD  Blood Pressure: 184/91 mmHg  Height: 150 cm  Weight: 59 kg  BSA: 1.5 m2  Heart Rate: 74 mmHg  ------------------------------------------------------------------------  PROCEDURE: Transthoracic echocardiogram with 2-D, M-Mode  and complete spectral and color flow Doppler.  INDICATION: Syncope and collapse (R55)  ------------------------------------------------------------------------  Dimensions:    Normal Values:  LA:     3.7    2.0 - 4.0 cm  Ao:     2.6    2.0 - 3.8 cm  SEPTUM: 1.9    0.6 - 1.2 cm  PWT: 1.2    0.6 - 1.1 cm  LVIDd:  4.0    3.0 - 5.6 cm  LVIDs:  2.8    1.8 - 4.0 cm  Derived variables:  LVMI: 159 g/m2  RWT: 0.60  Fractional short: 30 %  EF (Teicholtz): 58 %  Doppler Peak Velocity (m/sec): MV=1.8 AoV=2.0  ------------------------------------------------------------------------  Observations:  Mitral Valve: Mitral annular calcification, otherwise  normal mitral valve. Mild mitral regurgitation.  Aortic Valve/Aorta: Calcified trileaflet aortic valve with  decreased opening. Peak transaortic valve gradient equals  16 mm Hg, mean transaortic valve gradient equals 8 mm Hg,  estimated aortic valve area equals 1.6 sqcm (by continuity  equation), aortic valve velocity time integral equals 42  cm, consistent with mild aortic stenosis. Mild aortic  regurgitation.  Peak left ventricular outflow tract  gradient equals 7 mm Hg, mean gradient is equal to 4 mm Hg,  LVOT velocity time integral equals 26 cm.  Aortic Root: 2.6 cm.  LVOT diameter: 1.8 cm.  Left Atrium: Normal left atrium.  Left Ventricle: Normal left ventricular systolic function.  No segmental wall motion abnormalities. Severe concentric  left ventricular hypertrophy. Mild diastolic dysfunction  (Stage I).  Right Heart: Normal right atrium. Normal right ventricular  sizewith decreased right ventricular systolic function.  Normal tricuspid valve. No tricuspid regurgitation. Mild  pulmonic regurgitation.  Pericardium/Pleura: Normal pericardium with no pericardial  effusion.  Hemodynamic: Estimated right atrial pressure is 8 mm Hg.  ------------------------------------------------------------------------  Conclusions:  1. Calcified trileaflet aortic valve with decreased  opening. Peak transaortic valve gradient equals 16 mm Hg,  mean transaortic valve gradient equals 8 mm Hg, estimated  aortic valve area equals 1.6 sqcm (by continuity equation),  aortic valve velocity time integral equals 42 cm,  consistent with mild aortic stenosis.  2. Normal left atrium.  3. Severe concentric left ventricular hypertrophy.  4. Normal left ventricular systolic function. No segmental  wall motion abnormalities.  5. Mild diastolic dysfunction (Stage I).  6. Normal right ventricular size with decreased right  ventricular systolic function.  *** No previous Echo exam.  ------------------------------------------------------------------------  Confirmed on  11/21/2017 - 11:45:46 by Heather Romero M.D.  ------------------------------------------------------------------------    EXAM:  CT CHEST                            PROCEDURE DATE:  11/17/2017          INTERPRETATION:  CLINICAL INFORMATION: Status post fall this morning with   chest wall pain.    COMPARISON: None available.    PROCEDURE:   CT of the Chest was performed without intravenous contrast.  Sagittal and coronal reformats were performed.      FINDINGS:    CHEST:     LUNGS AND LARGE AIRWAYS: Patent central airways. Extensive centrilobular   emphysema, most prominent within the upper lobes.  PLEURA: No pleural effusion or pneumothorax.  VESSELS: Calcified atherosclerosis of the thoracic aorta which is   otherwise normal in caliber.  HEART: Heart size is enlarged. No pericardial effusion. Coronary artery   and mitral annular calcification.  MEDIASTINUM AND RIGO: Small volume nonspecific mediastinal lymph nodes.  CHEST WALL AND LOWER NECK: Within normal limits.  VISUALIZED UPPER ABDOMEN: A few subcentimeter hyperdense right upper pole   renal lesions, may represent proteinaceous versus hemorrhagic cysts   nonspecific thickening of the left adrenal gland..  BONES: Acute mildly displaced fracture of the right lateral sixth rib.    IMPRESSION:    Acute mildly displaced right lateral sixth rib fracture. No pneumothorax.    FUNMILAYO GOMES M.D., ATTENDING RADIOLOGIST  This document has been electronically signed. Nov 17 2017  9:38PM    Ventricular Rate 76 BPM    Atrial Rate 76 BPM    P-R Interval 152 ms    QRS Duration 144 ms    Q-T Interval 440 ms    QTC Calculation(Bazett) 495 ms    P Axis 47 degrees    R Axis -42 degrees    T Axis 9 degrees    Diagnosis Line Normal sinus rhythm  Left axis deviation  Right bundle branch block  Possible Lateral infarct , age undetermined  Abnormal ECG  When compared with ECG of 14-JUN-2021 12:52, (Unconfirmed)  Borderline criteria for Lateral infarct are now present  Confirmed by Isaac Zamora MD ([id number]) on 6/15/2021 1:32:46 PM

## 2021-06-18 NOTE — PROGRESS NOTE ADULT - PROBLEM SELECTOR PLAN 6
Chronic stable   - Takes at home amlodipine 5mg qd  - c/w home meds w/ holding parameters  - cardio following
Chronic stable   - Soft BP on ED  in the setting of active GIB   - Takes at home amlodipine 5mg qd  - c/w home meds w/ holding parameters
Chronic stable   - Takes at home amlodipine 5mg qd  - c/w home meds w/ holding parameters  - cardio following
Chronic stable   - Takes at home amlodipine 5mg qd  - c/w home meds w/ holding parameters  - cardio following

## 2021-06-18 NOTE — DISCHARGE NOTE PROVIDER - CARE PROVIDER_API CALL
Adwoa Barnes)  Gastroenterology; Internal Medicine  [address] [place] [address]  Phone: [phone]  Fax: [phone]  Follow Up Time: 1 week    Your primary care doctor,   Phone: (   )    -  Fax: (   )    -  Follow Up Time: 1-3 days    Jeremie Anaya  [facility]  [address] [facility], [address] [place] [address]  Phone: [phone]  Fax: [phone]  Follow Up Time: 1 week

## 2021-06-19 VITALS
DIASTOLIC BLOOD PRESSURE: 70 MMHG | RESPIRATION RATE: 17 BRPM | TEMPERATURE: 99 F | SYSTOLIC BLOOD PRESSURE: 131 MMHG | HEART RATE: 50 BPM | OXYGEN SATURATION: 93 %

## 2021-06-19 LAB
ALBUMIN SERPL ELPH-MCNC: 2.5 G/DL — LOW (ref 3.3–5)
ALP SERPL-CCNC: 38 U/L — LOW (ref 40–120)
ALT FLD-CCNC: 18 U/L — SIGNIFICANT CHANGE UP (ref 12–78)
ANION GAP SERPL CALC-SCNC: 8 MMOL/L — SIGNIFICANT CHANGE UP (ref 5–17)
AST SERPL-CCNC: 14 U/L — LOW (ref 15–37)
BILIRUB SERPL-MCNC: 0.3 MG/DL — SIGNIFICANT CHANGE UP (ref 0.2–1.2)
BUN SERPL-MCNC: 49 MG/DL — HIGH (ref 7–23)
CALCIUM SERPL-MCNC: 7.9 MG/DL — LOW (ref 8.5–10.1)
CHLORIDE SERPL-SCNC: 116 MMOL/L — HIGH (ref 96–108)
CO2 SERPL-SCNC: 20 MMOL/L — LOW (ref 22–31)
CREAT SERPL-MCNC: 3.1 MG/DL — HIGH (ref 0.5–1.3)
GLUCOSE SERPL-MCNC: 146 MG/DL — HIGH (ref 70–99)
HCT VFR BLD CALC: 27.3 % — LOW (ref 34.5–45)
HGB BLD-MCNC: 8.8 G/DL — LOW (ref 11.5–15.5)
MAGNESIUM SERPL-MCNC: 2.4 MG/DL — SIGNIFICANT CHANGE UP (ref 1.6–2.6)
MCHC RBC-ENTMCNC: 30.2 PG — SIGNIFICANT CHANGE UP (ref 27–34)
MCHC RBC-ENTMCNC: 32.2 GM/DL — SIGNIFICANT CHANGE UP (ref 32–36)
MCV RBC AUTO: 93.8 FL — SIGNIFICANT CHANGE UP (ref 80–100)
NRBC # BLD: 0 /100 WBCS — SIGNIFICANT CHANGE UP (ref 0–0)
PHOSPHATE SERPL-MCNC: 3.2 MG/DL — SIGNIFICANT CHANGE UP (ref 2.5–4.5)
PLATELET # BLD AUTO: 305 K/UL — SIGNIFICANT CHANGE UP (ref 150–400)
POTASSIUM SERPL-MCNC: 4.7 MMOL/L — SIGNIFICANT CHANGE UP (ref 3.5–5.3)
POTASSIUM SERPL-SCNC: 4.7 MMOL/L — SIGNIFICANT CHANGE UP (ref 3.5–5.3)
PROT SERPL-MCNC: 5.2 G/DL — LOW (ref 6–8.3)
RBC # BLD: 2.91 M/UL — LOW (ref 3.8–5.2)
RBC # FLD: 16.3 % — HIGH (ref 10.3–14.5)
SODIUM SERPL-SCNC: 144 MMOL/L — SIGNIFICANT CHANGE UP (ref 135–145)
WBC # BLD: 12.66 K/UL — HIGH (ref 3.8–10.5)
WBC # FLD AUTO: 12.66 K/UL — HIGH (ref 3.8–10.5)

## 2021-06-19 PROCEDURE — 84443 ASSAY THYROID STIM HORMONE: CPT

## 2021-06-19 PROCEDURE — 36415 COLL VENOUS BLD VENIPUNCTURE: CPT

## 2021-06-19 PROCEDURE — 86901 BLOOD TYPING SEROLOGIC RH(D): CPT

## 2021-06-19 PROCEDURE — 85014 HEMATOCRIT: CPT

## 2021-06-19 PROCEDURE — 86850 RBC ANTIBODY SCREEN: CPT

## 2021-06-19 PROCEDURE — 84100 ASSAY OF PHOSPHORUS: CPT

## 2021-06-19 PROCEDURE — 82306 VITAMIN D 25 HYDROXY: CPT

## 2021-06-19 PROCEDURE — 82272 OCCULT BLD FECES 1-3 TESTS: CPT

## 2021-06-19 PROCEDURE — 96375 TX/PRO/DX INJ NEW DRUG ADDON: CPT

## 2021-06-19 PROCEDURE — 83690 ASSAY OF LIPASE: CPT

## 2021-06-19 PROCEDURE — 85730 THROMBOPLASTIN TIME PARTIAL: CPT

## 2021-06-19 PROCEDURE — 84145 PROCALCITONIN (PCT): CPT

## 2021-06-19 PROCEDURE — 86769 SARS-COV-2 COVID-19 ANTIBODY: CPT

## 2021-06-19 PROCEDURE — 84484 ASSAY OF TROPONIN QUANT: CPT

## 2021-06-19 PROCEDURE — 93005 ELECTROCARDIOGRAM TRACING: CPT

## 2021-06-19 PROCEDURE — 80048 BASIC METABOLIC PNL TOTAL CA: CPT

## 2021-06-19 PROCEDURE — 82962 GLUCOSE BLOOD TEST: CPT

## 2021-06-19 PROCEDURE — 85027 COMPLETE CBC AUTOMATED: CPT

## 2021-06-19 PROCEDURE — 86923 COMPATIBILITY TEST ELECTRIC: CPT

## 2021-06-19 PROCEDURE — 70450 CT HEAD/BRAIN W/O DYE: CPT

## 2021-06-19 PROCEDURE — 36430 TRANSFUSION BLD/BLD COMPNT: CPT

## 2021-06-19 PROCEDURE — 72125 CT NECK SPINE W/O DYE: CPT

## 2021-06-19 PROCEDURE — 88305 TISSUE EXAM BY PATHOLOGIST: CPT

## 2021-06-19 PROCEDURE — 82746 ASSAY OF FOLIC ACID SERUM: CPT

## 2021-06-19 PROCEDURE — 83036 HEMOGLOBIN GLYCOSYLATED A1C: CPT

## 2021-06-19 PROCEDURE — 78278 ACUTE GI BLOOD LOSS IMAGING: CPT

## 2021-06-19 PROCEDURE — A9560: CPT

## 2021-06-19 PROCEDURE — 83550 IRON BINDING TEST: CPT

## 2021-06-19 PROCEDURE — U0003: CPT

## 2021-06-19 PROCEDURE — 96374 THER/PROPH/DIAG INJ IV PUSH: CPT | Mod: XU

## 2021-06-19 PROCEDURE — U0005: CPT

## 2021-06-19 PROCEDURE — 86900 BLOOD TYPING SEROLOGIC ABO: CPT

## 2021-06-19 PROCEDURE — 82550 ASSAY OF CK (CPK): CPT

## 2021-06-19 PROCEDURE — 99232 SBSQ HOSP IP/OBS MODERATE 35: CPT

## 2021-06-19 PROCEDURE — 99239 HOSP IP/OBS DSCHRG MGMT >30: CPT

## 2021-06-19 PROCEDURE — 74176 CT ABD & PELVIS W/O CONTRAST: CPT

## 2021-06-19 PROCEDURE — 93880 EXTRACRANIAL BILAT STUDY: CPT

## 2021-06-19 PROCEDURE — 82728 ASSAY OF FERRITIN: CPT

## 2021-06-19 PROCEDURE — 99285 EMERGENCY DEPT VISIT HI MDM: CPT

## 2021-06-19 PROCEDURE — 83540 ASSAY OF IRON: CPT

## 2021-06-19 PROCEDURE — P9016: CPT

## 2021-06-19 PROCEDURE — 82607 VITAMIN B-12: CPT

## 2021-06-19 PROCEDURE — 85025 COMPLETE CBC W/AUTO DIFF WBC: CPT

## 2021-06-19 PROCEDURE — 82553 CREATINE MB FRACTION: CPT

## 2021-06-19 PROCEDURE — 80053 COMPREHEN METABOLIC PANEL: CPT

## 2021-06-19 PROCEDURE — 85018 HEMOGLOBIN: CPT

## 2021-06-19 PROCEDURE — 97162 PT EVAL MOD COMPLEX 30 MIN: CPT

## 2021-06-19 PROCEDURE — 93880 EXTRACRANIAL BILAT STUDY: CPT | Mod: 26

## 2021-06-19 PROCEDURE — 83735 ASSAY OF MAGNESIUM: CPT

## 2021-06-19 PROCEDURE — 85610 PROTHROMBIN TIME: CPT

## 2021-06-19 RX ORDER — SITAGLIPTIN 50 MG/1
1 TABLET, FILM COATED ORAL
Qty: 0 | Refills: 0 | DISCHARGE

## 2021-06-19 RX ADMIN — MAGNESIUM OXIDE 400 MG ORAL TABLET 400 MILLIGRAM(S): 241.3 TABLET ORAL at 11:08

## 2021-06-19 RX ADMIN — Medication 25 MILLIGRAM(S): at 05:59

## 2021-06-19 RX ADMIN — PREGABALIN 1000 MICROGRAM(S): 225 CAPSULE ORAL at 11:08

## 2021-06-19 RX ADMIN — PANTOPRAZOLE SODIUM 40 MILLIGRAM(S): 20 TABLET, DELAYED RELEASE ORAL at 05:59

## 2021-06-19 RX ADMIN — AMLODIPINE BESYLATE 5 MILLIGRAM(S): 2.5 TABLET ORAL at 05:59

## 2021-06-19 RX ADMIN — Medication 5 MILLIGRAM(S): at 05:59

## 2021-06-19 NOTE — PROGRESS NOTE ADULT - SUBJECTIVE AND OBJECTIVE BOX
[facility] - Twila Encarnacion, Taurus, Noah, Dorothy, Nataliya Morales  Office Number:  [phone]    Patient resting comfortably in bed in NAD.  Laying flat with no respiratory distress.  No complaints of chest pain, dyspnea, palpitations, PND, or orthopnea.    ROS: negative unless otherwise mentioned.    Telemetry:  off    MEDICATIONS  (STANDING):  amLODIPine   Tablet 5 milliGRAM(s) Oral daily  atorvastatin 20 milliGRAM(s) Oral at bedtime  cyanocobalamin 1000 MICROGram(s) Oral daily  dextrose 40% Gel 15 Gram(s) Oral once  dextrose 5%. 1000 milliLiter(s) (50 mL/Hr) IV Continuous <Continuous>  dextrose 5%. 1000 milliLiter(s) (100 mL/Hr) IV Continuous <Continuous>  dextrose 50% Injectable 25 Gram(s) IV Push once  dextrose 50% Injectable 12.5 Gram(s) IV Push once  dextrose 50% Injectable 25 Gram(s) IV Push once  epoetin chanell-epbx (RETACRIT) Injectable 32384 Unit(s) SubCutaneous <User Schedule>  glucagon  Injectable 1 milliGRAM(s) IntraMuscular once  magnesium oxide 400 milliGRAM(s) Oral daily  metoprolol succinate ER 25 milliGRAM(s) Oral daily  oxybutynin 5 milliGRAM(s) Oral two times a day  pantoprazole  Injectable 40 milliGRAM(s) IV Push two times a day  traZODone 75 milliGRAM(s) Oral at bedtime    MEDICATIONS  (PRN):  meclizine 25 milliGRAM(s) Oral every 8 hours PRN Dizziness  traZODone 75 milliGRAM(s) Oral <User Schedule> PRN insomnia      Allergies    No Known Allergies    Intolerances        Vital Signs Last 24 Hrs  T(C): 37.1 (19 Jun 2021 11:38), Max: 37.1 (18 Jun 2021 19:03)  T(F): 98.8 (19 Jun 2021 11:38), Max: 98.8 (19 Jun 2021 11:38)  HR: 50 (19 Jun 2021 11:38) (50 - 76)  BP: 131/70 (19 Jun 2021 11:38) (127/72 - 165/76)  BP(mean): --  RR: 17 (19 Jun 2021 11:38) (17 - 18)  SpO2: 93% (19 Jun 2021 11:38) (93% - 98%)    I&O's Summary      ON EXAM:    Constitutional: NAD, awake and alert, well-developed, pale   HEENT: Moist Mucous Membranes, Anicteric  Pulmonary: Non-labored, breath sounds are clear bilaterally, No wheezing, crackles or rhonchi  Cardiovascular: Regular, S1 and S2 nl, murmur   Gastrointestinal: Bowel Sounds present, soft, nontender.   Lymph: No lymphadenopathy. No peripheral edema.  Skin: No visible rashes or ulcers.  Psych:  Mood & affect appropriate    LABS: All Labs Reviewed:                        8.8    12.66 )-----------( 305      ( 19 Jun 2021 07:26 )             27.3                         8.9    11.50 )-----------( 300      ( 18 Jun 2021 07:12 )             28.2                         8.7    x     )-----------( x        ( 17 Jun 2021 13:51 )             26.9     19 Jun 2021 07:26    144    |  116    |  49     ----------------------------<  146    4.7     |  20     |  3.10   18 Jun 2021 07:12    142    |  114    |  48     ----------------------------<  149    4.5     |  23     |  3.30   17 Jun 2021 06:30    143    |  111    |  50     ----------------------------<  133    4.2     |  24     |  3.30     Ca    7.9        19 Jun 2021 07:26  Ca    7.6        18 Jun 2021 07:12  Ca    7.3        17 Jun 2021 06:30  Phos  3.2       19 Jun 2021 07:26  Phos  3.1       18 Jun 2021 07:12  Mg     2.4       19 Jun 2021 07:26  Mg     2.5       18 Jun 2021 07:12    TPro  5.2    /  Alb  2.5    /  TBili  0.3    /  DBili  x      /  AST  14     /  ALT  18     /  AlkPhos  38     19 Jun 2021 07:26  TPro  5.6    /  Alb  2.6    /  TBili  0.3    /  DBili  x      /  AST  17     /  ALT  15     /  AlkPhos  41     18 Jun 2021 07:12    PT/INR - ( 18 Jun 2021 07:12 )   PT: 12.1 sec;   INR: 1.04 ratio               Blood Culture:     06-18 @ 07:12  TSH: 2.45

## 2021-06-19 NOTE — PROGRESS NOTE ADULT - REASON FOR ADMISSION
active GIB

## 2021-06-19 NOTE — CHART NOTE - NSCHARTNOTEFT_GEN_A_CORE
The patient was seen and examined on the day of discharge by the attending physician. Pt stable for discharge. Please see discharge note for additional information regarding the hospital course and the day of discharge.     Pt doing well with no complains this AM. States she feels well and wants to go home.   She felt lightheaded yesterday  in setting of low glucose, 95. Improved slightly when gluc went up to 116 after orange juice  Advised to hold Januvia on DC as glucose and been less than 200 for majority of time inpatient and given glucose level.  No tele events overnight.  WBC uptrend to 12 likely adrenal response to hypoglycemia.   Advised cbc repeat with PCP  Hgb remains stable  Imaging results printed and reviewed withpt including all incidental findings,  in adrenals,  lungs and  all.   patient aware to follow up

## 2021-06-19 NOTE — PROGRESS NOTE ADULT - ASSESSMENT
85 y/o female with CKD, anemia, HTN, HLD BIBA from home due to rectal bleeding/melena/hematochezia found to have hgb 3.9 s/p transfusion with appropriate response, s/p NMBS neg for active extravasation  s/p EGD/colon with Grade A esophagitis, HH, polyps s/p removal    Rec:  - goal hgb >7-8  - PPI BID for esophagitis  - will need outpatient follow up next week for VCE  - discharge planning    Adwoa Barnes MD  Gastroenterology  [address] [address] [phone]    ACP (advance care planning). Plan: Advanced care planning was discussed with patient and family.  Advanced care planning forms were reviewed and discussed.  Risks, benefits and alternatives of gastroenterologic procedures were discussed in detail and all questions were answered.    30 minutes spent.        
85 y/o female with PMHx T2DM, CKD, anemia, HTN, HLD BIBA from home due to rectal bleeding. Pt reports having  hematochezia and melena with each bowel movement since Saturday. Hb on admission 3.4 received 2 pRBCs in ED. Admitted for active GIB, s/p EGD/colonscopy 6/16.    Intermittent lightheadedness  - h/h stable, vitals wnl  - orthostatics   - b/l carotid dopplers ordered  - monitor on tele  - prn meclizine for vertigo 
85 y/o female with CKD, anemia, HTN, HLD BIBA from home due to rectal bleeding/melena/hematochezia found to have hgb 3.9 s/p transfusion with appropriate response, s/p NMBS neg for active extravasation    Rec:  - goal hgb >7-8  - PPI BID  - Plan for EGD/colon tomorrow, if negative, pt will need SB evaluation.  - NPO with ice chips/water only, we will write prep      Adwoa Barnes MD  Gastroenterology  [address] [address] [phone]    ACP (advance care planning). Plan: Advanced care planning was discussed with patient and family.  Advanced care planning forms were reviewed and discussed.  Risks, benefits and alternatives of gastroenterologic procedures were discussed in detail and all questions were answered.    30 minutes spent.        
86f T2DM, CKD stage IV, chronic anemia, HTN, HLD, insomnia, diverticulosis. No known structural heart disease, long-standing cardiac murmur presenting with blood in stool found with mildly elevated troponin in the setting of above  ,      GIB  - Presented with H/H 3.8/12.9 and +FOBT, s/p PRBC's x 4 units total, hgb 8.9 this am   - s/p EGD/colonoscopy showing grade esophagitis, hiatal hernia, pandiverticulitis, internal hemorrhoids, and polyps  - Follow GI recs    Troponin leak   - Likely, in the setting of severe anemia and CKD  - No sxs of angina, and no ischemic change on ekg  - Troponins trended down, CPK's remained normal.  No need to trend  - asa held for anemia  - Continue home statin  - No evidence of arrhythmias on tele, can discontinue  - No evidence for meaningful  volume overload.    HTN  - /62  - Continue home CCB and BB    CKD  - Followed by renal   - Avoid nephrotoxics    DVT ppx  - Per Primary    Akosua Mcdermott FNP-C  Cardiology NP  [id number] [phone]      
87 y/o female with CKD, anemia, HTN, HLD BIBA from home due to rectal bleeding/melena/hematochezia found to have hgb 3.9 s/p transfusion with appropriate response, s/p NMBS neg for active extravasation  s/p EGD/colon with Grade A esophagitis, HH, polyps s/p removal    Rec:  - goal hgb >7-8  - PPI BID for esophagitis  - will need outpatient follow up next week for VCE  - discharge planning    Adwoa Barnes MD  Gastroenterology  [address] [address] [phone]    ACP (advance care planning). Plan: Advanced care planning was discussed with patient and family.  Advanced care planning forms were reviewed and discussed.  Risks, benefits and alternatives of gastroenterologic procedures were discussed in detail and all questions were answered.    30 minutes spent.        
* SHER -- pre-renal azotemia and/or ATN ( syncope ).   * CKD4. 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 Baseline Cr ~4  * Acute on chronic anemia due to GI blood loss, CKD.   * Syncope    Renal indices improving. GI work up in progress. Retacrit for anemia. Avoid nephrotoxic meds as possible.   Add low dose beta blocker. Monitor BP trend. Titrate BP meds as needed. Salt restriction.   Will follow electrolytes and renal function trend. 
86f T2DM, CKD stage IV, chronic anemia, HTN, HLD, insomnia, diverticulosis. No known structural heart disease, long-standing cardiac murmur presenting with blood in stool found with mildly elevated troponin in the setting of above  ,      GIB  - Presented with H/H 3.8/12.9 and +FOBT, s/p PRBC's x 4 units total  - s/p EGD/colonoscopy showing grade esophagitis, hiatal hernia, pandiverticulitis, internal hemorrhoids, and polyps  - Follow GI recs    Troponin leak   - Likely, in the setting of severe anemia and CKD  - No sxs of angina, and no ischemic change on ekg  - Troponins trended down, CPK's remained normal.  No need to trend  - Continue to hold ASA.  Can resume if cleared by GI.  However, no clear indication for long-tern ASA  - Continue home statin  - No evidence of arrhythmias on tele, can discontinue  - No evidence for meaningful  volume overload.    HTN  - BP is labile at systolic 100's-150's.  Will not aggressively treat for now  - Continue home CCB and BB    CKD  - Followed by renal   - Avoid nephrotoxics    DVT ppx  - Per Primary    Rosie Romo DNP, NP-C  Cardiology   [phone]    
86f T2DM, CKD stage IV, chronic anemia, HTN, HLD, insomnia, diverticulosis. No known structural heart disease, long-standing cardiac murmur presenting with blood in stool found with mildly elevated troponin in the setting of above  ,      GIB  - Presented with H/H 3.8/12.9 and +FOBT, s/p PRBC's x 4 units total, hgb 8.8 this am and stable  - s/p EGD/colonoscopy showing grade esophagitis, hiatal hernia, pandiverticulitis, internal hemorrhoids, and polyps  - Follow GI recs  - cont ppi for esophagitis    Dizziness  - mild dizziness yesterday, now resolved, with po intake and bs control  - carotids with mild plaque    Troponin leak   - Likely, in the setting of severe anemia and CKD  - No sxs of angina, and no ischemic change on ekg  - Troponins trended down, CPK's remained normal.  No need to trend  - asa held for anemia  - Continue home statin  - No evidence of arrhythmias on tele, now off  - No evidence for meaningful  volume overload.    HTN  - BP with satisfactory control  - Continue home CCB and BB    DVT ppx  - Per Primary    dc planning for today
86f T2DM, CKD stage IV, chronic anemia, HTN, HLD, insomnia, diverticulosis. No known structural heart disease, long-standing cardiac murmur presenting with blood in stool found with mildly elevated troponin in the setting of above  ,      Troponin leak   -there is evidence of mild demand ischemia.  -she has no sxs of angina, and no ischemic change on ekg  -would trend trop to peak  -this likely reflects demand in the setting of  anemia and ckd  -hold asa  -cont statin  -tele revealing sr no events overnight   -there is no evidence for meaningful  volume overload.  -seen by GI plan for EGD/colonoscopy optimized from a cv perspective for planned low risk procedure    htn  -122/73  -cont amlodipine    CKD  -followed by renal     Akosua Mcdermott FNP-C  Cardiology NP  [facility] [phone]  
86 female with a history of HTN, CAD, CHF, HLD, CKD stage 4 with anemia now S/P GI bleed  S/P PRBC and EGD. Renal indices are stable and seem to be close to baseline. Will follow as out patient with Dr. Cody. 
* SHER -- pre-renal azotemia and/or ATN ( syncope ).   * CKD4. 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 Baseline Cr ~4  * Acute on chronic anemia due to GI blood loss, CKD.   * Syncope    Renal indices improving. GI work up in progress. EGD/colon today. Retacrit for anemia. Avoid nephrotoxic meds as possible.   Monitor BP trend. Titrate BP meds as needed. Salt restriction. Will follow electrolytes and renal function trend. 
·	87 y/o female with PMHx T2DM, CKD, anemia, HTN, HLD BIBA from home due to rectal bleeding. Pt reports having  hematochezia and melena with each bowel movement since Saturday. Hb on admission 3.4 received 2 pRBCs in ED. Admitted for active GIB, plan for EGD/colonscopy.
87 y/o female with PMHx T2DM, CKD, anemia, HTN, HLD BIBA from home due to rectal bleeding. Pt reports having  hematochezia and melena with each bowel movement since Saturday. Hb on admission 3.4 received 2 pRBCs in ED. Admitted for active GIB, s/p EGD/colonscopy 6/16.
·	85 y/o female with PMHx T2DM, CKD, anemia, HTN, HLD BIBA from home due to rectal bleeding. Pt reports having  hematochezia and melena with each bowel movement since Saturday. Hb on admission 3.4 received 2 pRBCs in ED. Admitted for active GIB, plan for EGD/colonscopy.

## 2021-06-19 NOTE — PROGRESS NOTE ADULT - PROVIDER SPECIALTY LIST ADULT
Nephrology
Cardiology
Nephrology
Gastroenterology
Nephrology
Cardiology
Gastroenterology
Cardiology
Cardiology
Gastroenterology
Nephrology
Hospitalist

## 2021-07-01 PROBLEM — K57.90 DIVERTICULOSIS OF INTESTINE, PART UNSPECIFIED, WITHOUT PERFORATION OR ABSCESS WITHOUT BLEEDING: Chronic | Status: ACTIVE | Noted: 2021-06-14

## 2021-07-01 PROBLEM — K62.5 HEMORRHAGE OF ANUS AND RECTUM: Chronic | Status: ACTIVE | Noted: 2021-06-14

## 2021-07-01 PROBLEM — D64.9 ANEMIA, UNSPECIFIED: Chronic | Status: ACTIVE | Noted: 2021-06-14

## 2021-07-12 DIAGNOSIS — Q27.30 ARTERIOVENOUS MALFORMATION, SITE UNSPECIFIED: ICD-10-CM

## 2021-07-22 ENCOUNTER — OUTPATIENT (OUTPATIENT)
Dept: OUTPATIENT SERVICES | Facility: HOSPITAL | Age: 86
LOS: 1 days | End: 2021-07-22
Payer: MEDICARE

## 2021-07-22 VITALS
WEIGHT: 119.93 LBS | SYSTOLIC BLOOD PRESSURE: 131 MMHG | OXYGEN SATURATION: 98 % | HEIGHT: 58.5 IN | DIASTOLIC BLOOD PRESSURE: 82 MMHG | HEART RATE: 82 BPM | RESPIRATION RATE: 18 BRPM | TEMPERATURE: 98 F

## 2021-07-22 DIAGNOSIS — Z01.818 ENCOUNTER FOR OTHER PREPROCEDURAL EXAMINATION: ICD-10-CM

## 2021-07-22 DIAGNOSIS — Z98.890 OTHER SPECIFIED POSTPROCEDURAL STATES: Chronic | ICD-10-CM

## 2021-07-22 DIAGNOSIS — Q27.30 ARTERIOVENOUS MALFORMATION, SITE UNSPECIFIED: ICD-10-CM

## 2021-07-22 LAB
ANION GAP SERPL CALC-SCNC: 14 MMOL/L — SIGNIFICANT CHANGE UP (ref 5–17)
BUN SERPL-MCNC: 52 MG/DL — HIGH (ref 7–23)
CALCIUM SERPL-MCNC: 10.8 MG/DL — HIGH (ref 8.4–10.5)
CHLORIDE SERPL-SCNC: 99 MMOL/L — SIGNIFICANT CHANGE UP (ref 96–108)
CO2 SERPL-SCNC: 24 MMOL/L — SIGNIFICANT CHANGE UP (ref 22–31)
CREAT SERPL-MCNC: 4.41 MG/DL — HIGH (ref 0.5–1.3)
GLUCOSE SERPL-MCNC: 105 MG/DL — HIGH (ref 70–99)
HCT VFR BLD CALC: 30.3 % — LOW (ref 34.5–45)
HGB BLD-MCNC: 9.4 G/DL — LOW (ref 11.5–15.5)
MCHC RBC-ENTMCNC: 29.6 PG — SIGNIFICANT CHANGE UP (ref 27–34)
MCHC RBC-ENTMCNC: 31 GM/DL — LOW (ref 32–36)
MCV RBC AUTO: 95.3 FL — SIGNIFICANT CHANGE UP (ref 80–100)
NRBC # BLD: 0 /100 WBCS — SIGNIFICANT CHANGE UP (ref 0–0)
PLATELET # BLD AUTO: 454 K/UL — HIGH (ref 150–400)
POTASSIUM SERPL-MCNC: 4.7 MMOL/L — SIGNIFICANT CHANGE UP (ref 3.5–5.3)
POTASSIUM SERPL-SCNC: 4.7 MMOL/L — SIGNIFICANT CHANGE UP (ref 3.5–5.3)
RBC # BLD: 3.18 M/UL — LOW (ref 3.8–5.2)
RBC # FLD: 14 % — SIGNIFICANT CHANGE UP (ref 10.3–14.5)
SODIUM SERPL-SCNC: 137 MMOL/L — SIGNIFICANT CHANGE UP (ref 135–145)
WBC # BLD: 12.04 K/UL — HIGH (ref 3.8–10.5)
WBC # FLD AUTO: 12.04 K/UL — HIGH (ref 3.8–10.5)

## 2021-07-22 PROCEDURE — 80048 BASIC METABOLIC PNL TOTAL CA: CPT

## 2021-07-22 PROCEDURE — G0463: CPT

## 2021-07-22 PROCEDURE — 83036 HEMOGLOBIN GLYCOSYLATED A1C: CPT

## 2021-07-22 PROCEDURE — 85027 COMPLETE CBC AUTOMATED: CPT

## 2021-07-22 RX ORDER — TRAZODONE HCL 50 MG
0.5 TABLET ORAL
Qty: 0 | Refills: 0 | DISCHARGE

## 2021-07-22 NOTE — H&P PST ADULT - NSANTHOSAYNRD_GEN_A_CORE
No. JOSE G screening performed.  STOP BANG Legend: 0-2 = LOW Risk; 3-4 = INTERMEDIATE Risk; 5-8 = HIGH Risk

## 2021-07-22 NOTE — H&P PST ADULT - NSICDXPASTSURGICALHX_GEN_ALL_CORE_FT
PAST SURGICAL HISTORY:  H/O blepharoplasty     H/O colonoscopy     H/O endoscopy     S/P cataract surgery bilateral eyes    Surgery, Elective left arm surgery as child x 2

## 2021-07-22 NOTE — H&P PST ADULT - NSICDXPROBLEM_GEN_ALL_CORE_FT
PROBLEM DIAGNOSES  Problem: Arteriovenous malformation  Assessment and Plan: EGD Single Balloon enteroscopy under anesthesia with Dr. Pedro Vasquez on 7/28/2021.  Pre-op instructions given. Questions answered.  COVID19 vaccination card in chart.  Medical evaluation prior to procedure.

## 2021-07-22 NOTE — H&P PST ADULT - HISTORY OF PRESENT ILLNESS
85 yo female     Hx anemia (had iron infusions) 87 yo female in NAD presents to [facility] prior to scheduled EGD Single Balloon enteroscopy under anesthesia with Dr. Pedro Vasquez on 7/28/2021. Pmhx includes HTN, HLD, heart murmur, DM, anemia (hx of iron infusions), CKD, former smoker (80 pack years). Patient is accompanied by her daughter, Nicolle. Patient reports she had upper GI bleed in 6/2021 and received a total of 4 RBCs at [facility]. She reports a cause of bleeding was not found; she was referred to Dr. Vasquez for evaluation.    Patient denies previous Covid19 infection/exposure,recent travel,fevers,chills,cough,SOB,loss of sense of smell/taste.    Vaccinated for COVID19; vaccination card in chart.

## 2021-07-22 NOTE — H&P PST ADULT - NSICDXFAMILYHX_GEN_ALL_CORE_FT
FAMILY HISTORY:  Father  Still living? No  Family history of CVA, Age at diagnosis: Age Unknown  FH: type 2 diabetes, Age at diagnosis: Age Unknown    Mother  Still living? Unknown  Family history of CVA, Age at diagnosis: Age Unknown    Sibling  Still living? Yes, Estimated age: Age Unknown  Family history of CVA, Age at diagnosis: Age Unknown

## 2021-07-22 NOTE — H&P PST ADULT - OTHER CARE PROVIDERS
Cardiologist - Griseldaiegal  - several months ago, Endocrinologist - Willem Chinchilla, Hematologist - Brianna (4/1/21), Dr. Cody (nephrologist) Cardiologist - [facility]  - [phone]- several months ago, Endocrinologist - Willem Chinchilla, Hematologist - Brianna (4/1/21), Dr. Cody [phone] (nephrologist)

## 2021-07-22 NOTE — H&P PST ADULT - NSICDXPASTMEDICALHX_GEN_ALL_CORE_FT
PAST MEDICAL HISTORY:  Anemia     Chronic kidney disease, unspecified CKD stage     Diabetes Mellitus Type II     Diverticulosis     GERD (Gastroesophageal Reflux Disease)     Hyperlipemia     Hypertension     Irritable bowel syndrome with diarrhea     Orthostatic hypotension     Rectal bleeding      PAST MEDICAL HISTORY:  Anemia     Chronic kidney disease, unspecified CKD stage     Diabetes Mellitus Type II     Diverticulosis     GERD (Gastroesophageal Reflux Disease)     H/O esophagitis     Hemorrhoids     Hernia, hiatal     Hyperlipemia     Hypertension     Irritable bowel syndrome with diarrhea     Orthostatic hypotension     Rectal bleeding

## 2021-07-22 NOTE — H&P PST ADULT - NEGATIVE GENERAL SYMPTOMS
[facility] Dept discharge antibiotic (if prescribed): Cephalexin (Keflex) 500 mg capsule, 1 capsule (500 mg) by mouth 2 times daily for 7 days.  Date of Rx (if applicable):  4/21/21  No changes in treatment per [facility] ED Lab Result Urine culture protocol.  
no fever/no chills/no sweating/no anorexia/no weight loss/no weight gain

## 2021-07-23 LAB
A1C WITH ESTIMATED AVERAGE GLUCOSE RESULT: 5.8 % — HIGH (ref 4–5.6)
ESTIMATED AVERAGE GLUCOSE: 120 MG/DL — HIGH (ref 68–114)

## 2021-07-27 NOTE — PRE PROCEDURE NOTE - PRE PROCEDURE EVALUATION
Attending Physician:  Dr. Pedro Vasquez                            Procedure: Single balloon enteroscopy     Indication for Procedure: Occult GIB   ________________________________________________________  PAST MEDICAL & SURGICAL HISTORY:  Hypertension    Hyperlipemia    Diabetes Mellitus Type II    GERD (Gastroesophageal Reflux Disease)    Irritable bowel syndrome with diarrhea    Orthostatic hypotension    Chronic kidney disease, unspecified CKD stage    Anemia    Rectal bleeding    Diverticulosis    H/O esophagitis    Hernia, hiatal    Hemorrhoids    Surgery, Elective  left arm surgery as child x 2    S/P cataract surgery  bilateral eyes    H/O blepharoplasty    H/O endoscopy    H/O colonoscopy      ALLERGIES:  No Known Allergies    HOME MEDICATIONS:  amLODIPine 5 mg oral tablet: 1 tab(s) orally once a day  Januvia 25 mg oral tablet: 1 tab(s) orally once a day (at bedtime)  magnesium gluconate 500 mg oral tablet: 1 tab(s) orally once a day  omeprazole 20 mg oral delayed release capsule: 1 cap(s) orally once a day  paricalcitol 1 mcg oral capsule: 1 tab(s) orally 3 times a week; Mon, Wed, Fri  Procrit: 1 dose(s) injectable every 6 weeks, As Needed  rosuvastatin 5 mg oral tablet: 2.5 milligram(s) orally once a day  Senna 8.6 mg oral tablet: 1 tab(s) orally once a day (at bedtime)  solifenacin 5 mg oral tablet: 1 tab(s) orally once a day  traZODone 150 mg oral tablet: 1 tab(s) orally once a day (at bedtime)  Vitamin B12 1000 mcg oral tablet: 1 tab(s) orally once a day    AICD/PPM: [ ] yes   [ ] no    PERTINENT LAB DATA:                      PHYSICAL EXAMINATION:    T(C): --  HR: --  BP: --  RR: --  SpO2: --    Constitutional: NAD  HEENT: PERRLA, EOMI,    Neck:  No JVD  Respiratory: CTAB/L  Cardiovascular: S1 and S2  Gastrointestinal: BS+, soft, NT/ND  Extremities: No peripheral edema  Neurological: A/O x 3, no focal deficits  Psychiatric: Normal mood, normal affect  Skin: No rashes    ASA Class: I [ ]  II [ ]  III [ ]  IV [ ]    COMMENTS:    The patient is a suitable candidate for the planned procedure unless box checked [ ]  No, explain:

## 2021-07-28 ENCOUNTER — APPOINTMENT (OUTPATIENT)
Dept: GASTROENTEROLOGY | Facility: HOSPITAL | Age: 86
End: 2021-07-28

## 2021-07-28 ENCOUNTER — OUTPATIENT (OUTPATIENT)
Dept: OUTPATIENT SERVICES | Facility: HOSPITAL | Age: 86
LOS: 1 days | End: 2021-07-28
Payer: MEDICARE

## 2021-07-28 VITALS
RESPIRATION RATE: 16 BRPM | HEART RATE: 77 BPM | TEMPERATURE: 97 F | WEIGHT: 119.93 LBS | DIASTOLIC BLOOD PRESSURE: 552 MMHG | HEIGHT: 59 IN | SYSTOLIC BLOOD PRESSURE: 131 MMHG | OXYGEN SATURATION: 96 %

## 2021-07-28 VITALS
RESPIRATION RATE: 15 BRPM | OXYGEN SATURATION: 95 % | HEART RATE: 85 BPM | DIASTOLIC BLOOD PRESSURE: 55 MMHG | SYSTOLIC BLOOD PRESSURE: 131 MMHG

## 2021-07-28 DIAGNOSIS — Z98.890 OTHER SPECIFIED POSTPROCEDURAL STATES: Chronic | ICD-10-CM

## 2021-07-28 DIAGNOSIS — Q27.30 ARTERIOVENOUS MALFORMATION, SITE UNSPECIFIED: ICD-10-CM

## 2021-07-28 PROCEDURE — 44378 SMALL BOWEL ENDOSCOPY: CPT

## 2021-07-28 PROCEDURE — 43270 EGD LESION ABLATION: CPT | Mod: GC,59

## 2021-07-28 PROCEDURE — 44369 SMALL BOWEL ENDOSCOPY: CPT | Mod: GC

## 2021-07-28 PROCEDURE — C1889: CPT

## 2021-07-28 NOTE — ASU PATIENT PROFILE, ADULT - ABILITY TO HEAR (WITH HEARING AID OR HEARING APPLIANCE IF NORMALLY USED):
wears hearing aid at home/Mildly to Moderately Impaired: difficulty hearing in some environments or speaker may need to increase volume or speak distinctly

## 2021-07-28 NOTE — ASU PATIENT PROFILE, ADULT - VISION (WITH CORRECTIVE LENSES IF THE PATIENT USUALLY WEARS THEM):
progressives/Partially impaired: cannot see medication labels or newsprint, but can see obstacles in path, and the surrounding layout; can count fingers at arm's length

## 2021-07-28 NOTE — ASU PATIENT PROFILE, ADULT - PSH
H/O blepharoplasty    H/O colonoscopy    H/O endoscopy    S/P cataract surgery  bilateral eyes  Surgery, Elective  left arm surgery as child x 2

## 2021-07-28 NOTE — ASU PATIENT PROFILE, ADULT - TEACHING/LEARNING RELIGIOUS CONSIDERATIONS
Area M Indication Text: Tumors in this location are included in Area M (cheek, forehead, scalp, neck, jawline and pretibial skin).  Mohs surgery is indicated for tumors in these anatomic locations. none

## 2021-07-28 NOTE — ASU PATIENT PROFILE, ADULT - PMH
Anemia    Chronic kidney disease, unspecified CKD stage    Diabetes Mellitus Type II    Diverticulosis    GERD (Gastroesophageal Reflux Disease)    H/O esophagitis    Hemorrhoids    Hernia, hiatal    Hyperlipemia    Hypertension    Irritable bowel syndrome with diarrhea    Orthostatic hypotension    Rectal bleeding

## 2021-09-22 NOTE — ED PROVIDER NOTE - CPE EDP HEAD NORM PED
Head atraumatic, normal cephalic shape. Quality 110: Preventive Care And Screening: Influenza Immunization: Influenza Immunization previously received during influenza season Quality 111:Pneumonia Vaccination Status For Older Adults: Pneumococcal Vaccination Previously Received Detail Level: Detailed

## 2021-10-07 ENCOUNTER — APPOINTMENT (OUTPATIENT)
Dept: GASTROENTEROLOGY | Facility: CLINIC | Age: 86
End: 2021-10-07

## 2021-11-17 NOTE — ED ADULT NURSE NOTE - PRIMARY CARE PROVIDER
CARDIAC CLEARANCE:  DR. OCONNOR  SCHEDULED: 11/27/21 & 12/30/21  BILATERAL CATARACT REMOVAL  LOV: 8/31/21  FAX: [phone]  PHONE: [phone]   Navneet Recinos

## 2022-07-09 NOTE — H&P PST ADULT - TEMPERATURE IN FAHRENHEIT (DEGREES F)
I was asked to take over the care of this patient at shift change from Dr. Dex Yen.  Dr. Yen stated the patient presented to the ER after being seen in her clinic today with concerns about a abscess that was identified on CT scan of her abdomen.  She apparently been having abdominal pains for about 2 weeks with gradual worsening of symptoms until seen today in the clinic.  Patient with a history for ventral hernia repair in March of this year.  Patient initiated on IV antibiotic therapy.  She had a dose of Zosyn at 5:00 this evening.  There are no beds available at this hospital.  There is no hospital beds available in the [facility] currently.  He stated that there was a possible bed available in [place], [place] but he was waiting a callback from the hospitalist.  The hospitalist eventually called back from the [facility] and after discussion stated that they would not be able to take the patient in transfer to their facility.  We called several other adjacent states to include [place], [place], and [place]. We were able to find an available hospital bed in [facility].  I spoke with Dr. Corbin, hospitalist, at the [facility] who accepted the patient in transfer to their facility.  Patient stated that her pain was currently controlled.  She stated that she was okay with the transfer to [place] as she has relatives who live in [place].  I ordered an additional dose of Zofran.    EMTALA and transfer forms were filled out and signed. AVS was printed and sent with the patient and EMS crew. The patient will be transported by BLS ambulance to the [facility].    Dx:  1. Intra-abdominal abscess (H)    2. Abdominal pain, suprapubic      Electronically signed, Armando Xiong DO  07/09/22 0250     98

## 2022-08-31 ENCOUNTER — INPATIENT (INPATIENT)
Facility: HOSPITAL | Age: 87
LOS: 1 days | Discharge: HOME | DRG: 682 | End: 2022-09-02
Attending: INTERNAL MEDICINE | Admitting: INTERNAL MEDICINE
Payer: MEDICARE

## 2022-08-31 ENCOUNTER — TRANSCRIPTION ENCOUNTER (OUTPATIENT)
Age: 87
End: 2022-08-31

## 2022-08-31 VITALS
OXYGEN SATURATION: 99 % | SYSTOLIC BLOOD PRESSURE: 140 MMHG | RESPIRATION RATE: 16 BRPM | HEIGHT: 59 IN | TEMPERATURE: 98 F | WEIGHT: 110.01 LBS | DIASTOLIC BLOOD PRESSURE: 84 MMHG | HEART RATE: 89 BPM

## 2022-08-31 DIAGNOSIS — Z98.890 OTHER SPECIFIED POSTPROCEDURAL STATES: Chronic | ICD-10-CM

## 2022-08-31 DIAGNOSIS — N18.5 CHRONIC KIDNEY DISEASE, STAGE 5: ICD-10-CM

## 2022-08-31 DIAGNOSIS — K21.9 GASTRO-ESOPHAGEAL REFLUX DISEASE WITHOUT ESOPHAGITIS: ICD-10-CM

## 2022-08-31 DIAGNOSIS — E11.9 TYPE 2 DIABETES MELLITUS WITHOUT COMPLICATIONS: ICD-10-CM

## 2022-08-31 DIAGNOSIS — N18.4 CHRONIC KIDNEY DISEASE, STAGE 4 (SEVERE): ICD-10-CM

## 2022-08-31 DIAGNOSIS — N17.9 ACUTE KIDNEY FAILURE, UNSPECIFIED: ICD-10-CM

## 2022-08-31 DIAGNOSIS — I10 ESSENTIAL (PRIMARY) HYPERTENSION: ICD-10-CM

## 2022-08-31 DIAGNOSIS — E78.5 HYPERLIPIDEMIA, UNSPECIFIED: ICD-10-CM

## 2022-08-31 DIAGNOSIS — B02.9 ZOSTER WITHOUT COMPLICATIONS: ICD-10-CM

## 2022-08-31 DIAGNOSIS — Z29.9 ENCOUNTER FOR PROPHYLACTIC MEASURES, UNSPECIFIED: ICD-10-CM

## 2022-08-31 LAB
ALBUMIN SERPL ELPH-MCNC: 3.8 G/DL — SIGNIFICANT CHANGE UP (ref 3.3–5)
ALP SERPL-CCNC: 49 U/L — SIGNIFICANT CHANGE UP (ref 40–120)
ALT FLD-CCNC: 16 U/L — SIGNIFICANT CHANGE UP (ref 12–78)
ANION GAP SERPL CALC-SCNC: 11 MMOL/L — SIGNIFICANT CHANGE UP (ref 5–17)
APPEARANCE UR: CLEAR — SIGNIFICANT CHANGE UP
AST SERPL-CCNC: 21 U/L — SIGNIFICANT CHANGE UP (ref 15–37)
BASOPHILS # BLD AUTO: 0 K/UL — SIGNIFICANT CHANGE UP (ref 0–0.2)
BASOPHILS NFR BLD AUTO: 0 % — SIGNIFICANT CHANGE UP (ref 0–2)
BILIRUB SERPL-MCNC: 0.4 MG/DL — SIGNIFICANT CHANGE UP (ref 0.2–1.2)
BILIRUB UR-MCNC: NEGATIVE — SIGNIFICANT CHANGE UP
BUN SERPL-MCNC: 67 MG/DL — HIGH (ref 7–23)
CALCIUM SERPL-MCNC: 9.5 MG/DL — SIGNIFICANT CHANGE UP (ref 8.5–10.1)
CHLORIDE SERPL-SCNC: 104 MMOL/L — SIGNIFICANT CHANGE UP (ref 96–108)
CO2 SERPL-SCNC: 22 MMOL/L — SIGNIFICANT CHANGE UP (ref 22–31)
COLOR SPEC: YELLOW — SIGNIFICANT CHANGE UP
CREAT SERPL-MCNC: 5.1 MG/DL — HIGH (ref 0.5–1.3)
DIFF PNL FLD: ABNORMAL
EGFR: 8 ML/MIN/1.73M2 — LOW
EOSINOPHIL # BLD AUTO: 0 K/UL — SIGNIFICANT CHANGE UP (ref 0–0.5)
EOSINOPHIL NFR BLD AUTO: 0 % — SIGNIFICANT CHANGE UP (ref 0–6)
GLUCOSE SERPL-MCNC: 131 MG/DL — HIGH (ref 70–99)
GLUCOSE UR QL: 50 MG/DL
HCT VFR BLD CALC: 33.3 % — LOW (ref 34.5–45)
HGB BLD-MCNC: 10.5 G/DL — LOW (ref 11.5–15.5)
KETONES UR-MCNC: ABNORMAL
LEUKOCYTE ESTERASE UR-ACNC: NEGATIVE — SIGNIFICANT CHANGE UP
LYMPHOCYTES # BLD AUTO: 0.4 K/UL — LOW (ref 1–3.3)
LYMPHOCYTES # BLD AUTO: 3 % — LOW (ref 13–44)
MAGNESIUM SERPL-MCNC: 4.8 MG/DL — HIGH (ref 1.6–2.6)
MCHC RBC-ENTMCNC: 29 PG — SIGNIFICANT CHANGE UP (ref 27–34)
MCHC RBC-ENTMCNC: 31.5 GM/DL — LOW (ref 32–36)
MCV RBC AUTO: 92 FL — SIGNIFICANT CHANGE UP (ref 80–100)
MONOCYTES # BLD AUTO: 0.8 K/UL — SIGNIFICANT CHANGE UP (ref 0–0.9)
MONOCYTES NFR BLD AUTO: 6 % — SIGNIFICANT CHANGE UP (ref 2–14)
NEUTROPHILS # BLD AUTO: 12.2 K/UL — HIGH (ref 1.8–7.4)
NEUTROPHILS NFR BLD AUTO: 86 % — HIGH (ref 43–77)
NITRITE UR-MCNC: NEGATIVE — SIGNIFICANT CHANGE UP
NRBC # BLD: SIGNIFICANT CHANGE UP /100 WBCS (ref 0–0)
PH UR: 6.5 — SIGNIFICANT CHANGE UP (ref 5–8)
PLATELET # BLD AUTO: 412 K/UL — HIGH (ref 150–400)
POTASSIUM SERPL-MCNC: 5 MMOL/L — SIGNIFICANT CHANGE UP (ref 3.5–5.3)
POTASSIUM SERPL-SCNC: 5 MMOL/L — SIGNIFICANT CHANGE UP (ref 3.5–5.3)
PROT SERPL-MCNC: 7.1 G/DL — SIGNIFICANT CHANGE UP (ref 6–8.3)
PROT UR-MCNC: 100
RBC # BLD: 3.62 M/UL — LOW (ref 3.8–5.2)
RBC # FLD: 14.1 % — SIGNIFICANT CHANGE UP (ref 10.3–14.5)
SARS-COV-2 RNA SPEC QL NAA+PROBE: SIGNIFICANT CHANGE UP
SODIUM SERPL-SCNC: 137 MMOL/L — SIGNIFICANT CHANGE UP (ref 135–145)
SP GR SPEC: 1.01 — SIGNIFICANT CHANGE UP (ref 1.01–1.02)
UROBILINOGEN FLD QL: NEGATIVE — SIGNIFICANT CHANGE UP
WBC # BLD: 13.41 K/UL — HIGH (ref 3.8–10.5)
WBC # FLD AUTO: 13.41 K/UL — HIGH (ref 3.8–10.5)

## 2022-08-31 PROCEDURE — G1004: CPT

## 2022-08-31 PROCEDURE — 70450 CT HEAD/BRAIN W/O DYE: CPT | Mod: 26,ME

## 2022-08-31 PROCEDURE — 99285 EMERGENCY DEPT VISIT HI MDM: CPT | Mod: FS

## 2022-08-31 PROCEDURE — 71045 X-RAY EXAM CHEST 1 VIEW: CPT | Mod: 26

## 2022-08-31 PROCEDURE — 99223 1ST HOSP IP/OBS HIGH 75: CPT | Mod: GC,AI

## 2022-08-31 RX ORDER — METHYLPREDNISOLONE 4 MG
500 TABLET ORAL DAILY
Refills: 0 | Status: DISCONTINUED | OUTPATIENT
Start: 2022-08-31 | End: 2022-08-31

## 2022-08-31 RX ORDER — PANTOPRAZOLE SODIUM 20 MG/1
40 TABLET, DELAYED RELEASE ORAL
Refills: 0 | Status: DISCONTINUED | OUTPATIENT
Start: 2022-08-31 | End: 2022-09-02

## 2022-08-31 RX ORDER — MAGNESIUM OXIDE 400 MG ORAL TABLET 241.3 MG
400 TABLET ORAL DAILY
Refills: 0 | Status: DISCONTINUED | OUTPATIENT
Start: 2022-08-31 | End: 2022-09-02

## 2022-08-31 RX ORDER — AMLODIPINE BESYLATE 2.5 MG/1
1 TABLET ORAL
Qty: 0 | Refills: 0 | DISCHARGE

## 2022-08-31 RX ORDER — GLUCAGON INJECTION, SOLUTION 0.5 MG/.1ML
1 INJECTION, SOLUTION SUBCUTANEOUS ONCE
Refills: 0 | Status: DISCONTINUED | OUTPATIENT
Start: 2022-08-31 | End: 2022-09-02

## 2022-08-31 RX ORDER — PREGABALIN 225 MG/1
1 CAPSULE ORAL
Qty: 0 | Refills: 0 | DISCHARGE

## 2022-08-31 RX ORDER — SITAGLIPTIN 50 MG/1
1 TABLET, FILM COATED ORAL
Qty: 0 | Refills: 0 | DISCHARGE

## 2022-08-31 RX ORDER — TRAZODONE HCL 50 MG
1 TABLET ORAL
Qty: 0 | Refills: 0 | DISCHARGE

## 2022-08-31 RX ORDER — OXYBUTYNIN CHLORIDE 5 MG
5 TABLET ORAL
Refills: 0 | Status: DISCONTINUED | OUTPATIENT
Start: 2022-08-31 | End: 2022-09-02

## 2022-08-31 RX ORDER — HEPARIN SODIUM 5000 [USP'U]/ML
5000 INJECTION INTRAVENOUS; SUBCUTANEOUS EVERY 8 HOURS
Refills: 0 | Status: DISCONTINUED | OUTPATIENT
Start: 2022-08-31 | End: 2022-09-02

## 2022-08-31 RX ORDER — TRAMADOL HYDROCHLORIDE 50 MG/1
25 TABLET ORAL EVERY 6 HOURS
Refills: 0 | Status: DISCONTINUED | OUTPATIENT
Start: 2022-08-31 | End: 2022-09-01

## 2022-08-31 RX ORDER — AMLODIPINE BESYLATE 2.5 MG/1
5 TABLET ORAL DAILY
Refills: 0 | Status: DISCONTINUED | OUTPATIENT
Start: 2022-08-31 | End: 2022-09-02

## 2022-08-31 RX ORDER — DEXTROSE 50 % IN WATER 50 %
15 SYRINGE (ML) INTRAVENOUS ONCE
Refills: 0 | Status: DISCONTINUED | OUTPATIENT
Start: 2022-08-31 | End: 2022-09-02

## 2022-08-31 RX ORDER — ATORVASTATIN CALCIUM 80 MG/1
20 TABLET, FILM COATED ORAL AT BEDTIME
Refills: 0 | Status: DISCONTINUED | OUTPATIENT
Start: 2022-08-31 | End: 2022-09-02

## 2022-08-31 RX ORDER — GABAPENTIN 400 MG/1
300 CAPSULE ORAL ONCE
Refills: 0 | Status: COMPLETED | OUTPATIENT
Start: 2022-08-31 | End: 2022-08-31

## 2022-08-31 RX ORDER — SODIUM CHLORIDE 9 MG/ML
1000 INJECTION INTRAMUSCULAR; INTRAVENOUS; SUBCUTANEOUS ONCE
Refills: 0 | Status: COMPLETED | OUTPATIENT
Start: 2022-08-31 | End: 2022-08-31

## 2022-08-31 RX ORDER — ROSUVASTATIN CALCIUM 5 MG/1
2.5 TABLET ORAL
Qty: 0 | Refills: 0 | DISCHARGE

## 2022-08-31 RX ORDER — PARICALCITOL 2 UG/1
1 CAPSULE, GELATIN COATED ORAL
Qty: 0 | Refills: 0 | DISCHARGE

## 2022-08-31 RX ORDER — METHYLPREDNISOLONE 4 MG
1 TABLET ORAL
Qty: 0 | Refills: 0 | DISCHARGE

## 2022-08-31 RX ORDER — GABAPENTIN 400 MG/1
300 CAPSULE ORAL DAILY
Refills: 0 | Status: DISCONTINUED | OUTPATIENT
Start: 2022-08-31 | End: 2022-09-02

## 2022-08-31 RX ORDER — SODIUM CHLORIDE 9 MG/ML
1000 INJECTION, SOLUTION INTRAVENOUS
Refills: 0 | Status: DISCONTINUED | OUTPATIENT
Start: 2022-08-31 | End: 2022-09-02

## 2022-08-31 RX ORDER — INSULIN LISPRO 100/ML
VIAL (ML) SUBCUTANEOUS
Refills: 0 | Status: DISCONTINUED | OUTPATIENT
Start: 2022-08-31 | End: 2022-09-02

## 2022-08-31 RX ORDER — SODIUM CHLORIDE 9 MG/ML
1000 INJECTION INTRAMUSCULAR; INTRAVENOUS; SUBCUTANEOUS
Refills: 0 | Status: DISCONTINUED | OUTPATIENT
Start: 2022-08-31 | End: 2022-09-01

## 2022-08-31 RX ORDER — SOLIFENACIN SUCCINATE 10 MG/1
1 TABLET ORAL
Qty: 0 | Refills: 0 | DISCHARGE

## 2022-08-31 RX ORDER — SODIUM BICARBONATE 1 MEQ/ML
1 SYRINGE (ML) INTRAVENOUS
Qty: 0 | Refills: 0 | DISCHARGE

## 2022-08-31 RX ORDER — DEXTROSE 50 % IN WATER 50 %
12.5 SYRINGE (ML) INTRAVENOUS ONCE
Refills: 0 | Status: DISCONTINUED | OUTPATIENT
Start: 2022-08-31 | End: 2022-09-02

## 2022-08-31 RX ORDER — DEXTROSE 50 % IN WATER 50 %
25 SYRINGE (ML) INTRAVENOUS ONCE
Refills: 0 | Status: DISCONTINUED | OUTPATIENT
Start: 2022-08-31 | End: 2022-09-02

## 2022-08-31 RX ORDER — PREGABALIN 225 MG/1
1000 CAPSULE ORAL DAILY
Refills: 0 | Status: DISCONTINUED | OUTPATIENT
Start: 2022-08-31 | End: 2022-09-02

## 2022-08-31 RX ORDER — TRAZODONE HCL 50 MG
150 TABLET ORAL AT BEDTIME
Refills: 0 | Status: DISCONTINUED | OUTPATIENT
Start: 2022-08-31 | End: 2022-09-02

## 2022-08-31 RX ORDER — SENNA PLUS 8.6 MG/1
1 TABLET ORAL
Qty: 0 | Refills: 0 | DISCHARGE

## 2022-08-31 RX ORDER — SODIUM BICARBONATE 1 MEQ/ML
650 SYRINGE (ML) INTRAVENOUS
Refills: 0 | Status: DISCONTINUED | OUTPATIENT
Start: 2022-08-31 | End: 2022-09-02

## 2022-08-31 RX ORDER — ACYCLOVIR SODIUM 500 MG
250 VIAL (EA) INTRAVENOUS EVERY 24 HOURS
Refills: 0 | Status: DISCONTINUED | OUTPATIENT
Start: 2022-08-31 | End: 2022-09-02

## 2022-08-31 RX ORDER — ERYTHROPOIETIN 10000 [IU]/ML
1 INJECTION, SOLUTION INTRAVENOUS; SUBCUTANEOUS
Qty: 0 | Refills: 0 | DISCHARGE

## 2022-08-31 RX ADMIN — SODIUM CHLORIDE 75 MILLILITER(S): 9 INJECTION INTRAMUSCULAR; INTRAVENOUS; SUBCUTANEOUS at 18:06

## 2022-08-31 RX ADMIN — ATORVASTATIN CALCIUM 20 MILLIGRAM(S): 80 TABLET, FILM COATED ORAL at 22:02

## 2022-08-31 RX ADMIN — Medication 650 MILLIGRAM(S): at 18:04

## 2022-08-31 RX ADMIN — Medication 105 MILLIGRAM(S): at 18:43

## 2022-08-31 RX ADMIN — SODIUM CHLORIDE 1000 MILLILITER(S): 9 INJECTION INTRAMUSCULAR; INTRAVENOUS; SUBCUTANEOUS at 12:30

## 2022-08-31 RX ADMIN — GABAPENTIN 300 MILLIGRAM(S): 400 CAPSULE ORAL at 14:31

## 2022-08-31 RX ADMIN — AMLODIPINE BESYLATE 5 MILLIGRAM(S): 2.5 TABLET ORAL at 18:06

## 2022-08-31 RX ADMIN — Medication 150 MILLIGRAM(S): at 22:15

## 2022-08-31 RX ADMIN — SODIUM CHLORIDE 75 MILLILITER(S): 9 INJECTION INTRAMUSCULAR; INTRAVENOUS; SUBCUTANEOUS at 14:31

## 2022-08-31 RX ADMIN — SODIUM CHLORIDE 1000 MILLILITER(S): 9 INJECTION INTRAMUSCULAR; INTRAVENOUS; SUBCUTANEOUS at 13:40

## 2022-08-31 RX ADMIN — SODIUM CHLORIDE 2000 MILLILITER(S): 9 INJECTION INTRAMUSCULAR; INTRAVENOUS; SUBCUTANEOUS at 13:54

## 2022-08-31 RX ADMIN — Medication 5 MILLIGRAM(S): at 18:04

## 2022-08-31 RX ADMIN — SODIUM CHLORIDE 2000 MILLILITER(S): 9 INJECTION INTRAMUSCULAR; INTRAVENOUS; SUBCUTANEOUS at 12:17

## 2022-08-31 RX ADMIN — HEPARIN SODIUM 5000 UNIT(S): 5000 INJECTION INTRAVENOUS; SUBCUTANEOUS at 22:02

## 2022-08-31 NOTE — H&P ADULT - PROBLEM SELECTOR PROBLEM 3
----- Message from Ashwini Conklin NP sent at 5/13/2021  1:34 PM CDT -----  Please call Kb, her bone density shows osteopenia (also present in 2019). She should continue her Vitamin D and Calcium, daily exercise, also using resistance and strength training can help with improving bone density as well. I would repeat the Bone density in 2 years.    Stage 4 chronic kidney disease

## 2022-08-31 NOTE — CONSULT NOTE ADULT - SUBJECTIVE AND OBJECTIVE BOX
[facility] DIVISION of INFECTIOUS DISEASE  Isaac Rodriguez MD PhD, Courtney Vasquez MD, Tami Seals MD, Jeremias Morales MD, Andreas Khalil MD  and providing coverage with Annette Vazquez MD  Providing Infectious Disease Consultations at [facility], [facility]'s    Office# [phone] to schedule follow up appointments  Answering Service for urgent calls or New Consults [phone]  Cell# to text for urgent issues Isaac Michael [phone]     HPI:  88 y/o female with PMH of DM2, CKD, HTN, HLD, GERD presents to the ED with dizziness and disorientation. This started on . She then went to [facility] urgent care that day, diagnosed with shingles and prescribed Valtrex. Later that day she took advil and vicodin because of the pain from the rash, and became disoriented and sleepy. Her physician called her and recommended going to the ER immediately out of concern for severe AMS. Of note, patient reports extremely poor oral intake of food/hydration during this time as she felt "too out of it to go to the kitchen". She has had episodes of vertigo in the past, but nothing like this before.     In the ED  Vitals: 140/84; HR 89, RR 16, T 97.7, o2 99%  Labs significant for WBC 13.4, Hb 10.5, BUN/Cr 67/5.1, eGFR 8   Received: 3L NS, 300mg Gabapentin    Imaging:  CT Head  No acute hemorrhage or mass effect   Chest Xray  Small left midlung infiltrate; no cardiac findings  (31 Aug 2022 15:01)      PAST MEDICAL & SURGICAL HISTORY:  Hypertension      Hyperlipemia      Diabetes Mellitus Type II      GERD (Gastroesophageal Reflux Disease)      Irritable bowel syndrome with diarrhea      Orthostatic hypotension      Chronic kidney disease, unspecified CKD stage      Anemia      Rectal bleeding      Diverticulosis      H/O esophagitis      Hernia, hiatal      Hemorrhoids      Surgery, Elective  left arm surgery as child x 2      S/P cataract surgery  bilateral eyes      H/O blepharoplasty      H/O endoscopy      H/O colonoscopy          Antimicrobials      Immunological      Other  amLODIPine   Tablet 5 milliGRAM(s) Oral daily  atorvastatin 20 milliGRAM(s) Oral at bedtime  cyanocobalamin 1000 MICROGram(s) Oral daily  dextrose 5%. 1000 milliLiter(s) IV Continuous <Continuous>  dextrose 5%. 1000 milliLiter(s) IV Continuous <Continuous>  dextrose 50% Injectable 25 Gram(s) IV Push once  dextrose 50% Injectable 12.5 Gram(s) IV Push once  dextrose 50% Injectable 25 Gram(s) IV Push once  dextrose Oral Gel 15 Gram(s) Oral once PRN  gabapentin 300 milliGRAM(s) Oral daily  glucagon  Injectable 1 milliGRAM(s) IntraMuscular once  insulin lispro (ADMELOG) corrective regimen sliding scale   SubCutaneous three times a day before meals  magnesium oxide 400 milliGRAM(s) Oral daily  oxybutynin 5 milliGRAM(s) Oral two times a day  pantoprazole    Tablet 40 milliGRAM(s) Oral before breakfast  sodium bicarbonate 650 milliGRAM(s) Oral four times a day  sodium chloride 0.9%. 1000 milliLiter(s) IV Continuous <Continuous>  sodium chloride 0.9%. 1000 milliLiter(s) IV Continuous <Continuous>  traMADol 25 milliGRAM(s) Oral every 6 hours PRN  traZODone 150 milliGRAM(s) Oral at bedtime      Allergies    No Known Allergies    Intolerances        SOCIAL HISTORY:  Social History:  Patient lives at home alone, ambulates independantly, and performs her ADLs on her own. She has a 40 pack year smoking history but has not smoked in 30 years. She does not drink or use illicit substances. She is retired. (31 Aug 2022 15:01)      FAMILY HISTORY:  FH: type 2 diabetes (Father)    Family history of CVA (Mother, Sibling, Father)        ROS:    EYES:  Negative  blurry vision or double vision  GASTROINTESTINAL:  Negative for nausea, vomiting, diarrhea  -otherwise negative except for subjective    Vital Signs Last 24 Hrs  T(C): 36.7 (31 Aug 2022 14:36), Max: 36.7 (31 Aug 2022 14:36)  T(F): 98 (31 Aug 2022 14:36), Max: 98 (31 Aug 2022 14:36)  HR: 86 (31 Aug 2022 14:36) (86 - 89)  BP: 160/72 (31 Aug 2022 14:36) (140/84 - 160/72)  BP(mean): --  RR: 18 (31 Aug 2022 14:36) (16 - 18)  SpO2: 100% (31 Aug 2022 14:36) (99% - 100%)    Parameters below as of 31 Aug 2022 14:36  Patient On (Oxygen Delivery Method): room air        PE:  WDWN in no distress  HEENT:  NC, PERRL, sclerae anicteric, conjunctivae clear, EOMI.  Sinuses nontender, no nasal exudate.  No buccal or pharyngeal lesions, erythema or exudate  Neck:  Supple, no adenopathy  Lungs:  No accessory muscle use, bilaterally clear to auscultation  Cor:  distant  Abd:  Symmetric, normoactive BS.  Soft, nontender, no masses, guarding or rebound.  Liver and spleen not enlarged  Extrem:  No cyanosis or edema  Skin:  rash on back and left chjest dermatomal and not crossing midline (pt has scoliosis), very red and open areas  Neuro: grossly intact  Musc: moving all limbs freely, no focal deficits        LABS:                        10.5   13.41 )-----------( 412      ( 31 Aug 2022 11:55 )             33.3       WBC Count: 13.41 K/uL (22 @ 11:55)          137  |  104  |  67<H>  ----------------------------<  131<H>  5.0   |  22  |  5.10<H>    Ca    9.5      31 Aug 2022 11:55  Mg     4.8         TPro  7.1  /  Alb  3.8  /  TBili  0.4  /  DBili  x   /  AST  21  /  ALT  16  /  AlkPhos  49        Creatinine, Serum: 5.10 mg/dL (22 @ 11:55)      Urinalysis Basic - ( 31 Aug 2022 13:39 )    Color: Yellow / Appearance: Clear / S.010 / pH: x  Gluc: x / Ketone: Trace  / Bili: Negative / Urobili: Negative   Blood: x / Protein: 100 / Nitrite: Negative   Leuk Esterase: Negative / RBC: 0-2 /HPF / WBC 0-2   Sq Epi: x / Non Sq Epi: Occasional / Bacteria: x    eGFR: 8 mL/min/1.73m2 (22 @ 11:55)        MICROBIOLOGY:      RADIOLOGY & ADDITIONAL STUDIES:    --

## 2022-08-31 NOTE — CONSULT NOTE ADULT - SUBJECTIVE AND OBJECTIVE BOX
Patient is a 87y old  Female who presents with a chief complaint of dizziness.    HPI:  86 yo female with h/o htn, dm, ckd (no on dialysis), hld presents to the ED c/o feeling disoriented x several days. Patient recently diagnosed with shingles, went to  on Saturday and put on valcylcovir 500 mg TIB. Since  she hasn't been feeling well. States she feels disoriented and out of it. Patient with rash to left upper back and left chest. Patient took one dose of  vicoden on Monday. Associated with decreased PO and difficulty ambulating. At baseline patient ambulates without assistance and lives on her own.    	pmd: Dr. Gann, renal: Dr. Cody    Renal consult called for SHER on CKD. History obtained from chart and patient's daughter at bedside.     PAST MEDICAL HISTORY:  Hypertension    Hyperlipemia    Diabetes Mellitus Type II    GERD (Gastroesophageal Reflux Disease)    Lung Cancer    Irritable bowel syndrome with diarrhea    Orthostatic hypotension    Chronic kidney disease, unspecified CKD stage    Anemia    Rectal bleeding    Diverticulosis    H/O esophagitis    Hernia, hiatal    Hemorrhoids        PAST SURGICAL HISTORY:  Hypertension    Surgery, Elective    S/P cataract surgery    H/O blepharoplasty    H/O endoscopy    H/O colonoscopy        FAMILY HISTORY:  FH: type 2 diabetes (Father)    Family history of CVA (Mother, Sibling, Father)        SOCIAL HISTORY:    Allergies    No Known Allergies    Intolerances      Home Medications:  amLODIPine 5 mg oral tablet: 1 tab(s) orally once a day (2021 09:15)  Januvia 25 mg oral tablet: 1 tab(s) orally once a day (at bedtime) (2021 09:15)  magnesium gluconate 500 mg oral tablet: 1 tab(s) orally once a day (2021 09:15)  omeprazole 20 mg oral delayed release capsule: 1 cap(s) orally once a day (2021 09:15)  paricalcitol 1 mcg oral capsule: 1 tab(s) orally 3 times a week; Mon, Wed, Fri (2021 09:15)  Procrit: 1 dose(s) injectable every 6 weeks, As Needed (2021 09:15)  rosuvastatin 5 mg oral tablet: 2.5 milligram(s) orally once a day (2021 09:15)  Senna 8.6 mg oral tablet: 1 tab(s) orally once a day (at bedtime) (2021 09:15)  solifenacin 5 mg oral tablet: 1 tab(s) orally once a day (2021 09:15)  traZODone 150 mg oral tablet: 1 tab(s) orally once a day (at bedtime) (2021 09:15)  Vitamin B12 1000 mcg oral tablet: 1 tab(s) orally once a day (2021 09:15)    MEDICATIONS  (STANDING):  sodium chloride 0.9% Bolus 1000 milliLiter(s) IV Bolus Once    MEDICATIONS  (PRN):      REVIEW OF SYSTEMS:  General:   Respiratory: No cough, SOB  Cardiovascular: No CP or Palpitations	  Gastrointestinal: No nausea, Vomiting. No diarrhea  Genitourinary: No urinary complaints	  Musculoskeletal: No leg swelling, No new rash or lesions	  Neurological: 	  all other systems negative    T(F): 97.7 (22 @ 11:05), Max: 97.7 (22 @ 11:05)  HR: 89 (22 @ 11:05) (89 - 89)  BP: 140/84 (22 @ 11:05) (140/84 - 140/84)  RR: 16 (22 @ 11:05) (16 - 16)  SpO2: 99% (22 @ 11:05) (99% - 99%)  Wt(kg): --    PHYSICAL EXAM:  General: NAD  Respiratory: b/l air entry  Cardiovascular: S1 S2  Gastrointestinal: soft  Extremities: edema            137  |  104  |  67<H>  ----------------------------<  131<H>  5.0   |  22  |  5.10<H>    Ca    9.5      31 Aug 2022 11:55  Mg     4.8         TPro  7.1  /  Alb  3.8  /  TBili  0.4  /  DBili  x   /  AST  21  /  ALT  16  /  AlkPhos  49                            10.5   13.41 )-----------( 412      ( 31 Aug 2022 11:55 )             33.3       Potassium, Serum: 5.0 mmol/L ( @ 11:55)  Blood Urea Nitrogen, Serum: 67 mg/dL ( @ 11:55)  Calcium, Total Serum: 9.5 mg/dL ( @ 11:55)  Hemoglobin: 10.5 g/dL ( @ 11:55)      Creatinine, Serum: 5.10 ( @ 11:55)        LIVER FUNCTIONS - ( 31 Aug 2022 11:55 )  Alb: 3.8 g/dL / Pro: 7.1 g/dL / ALK PHOS: 49 U/L / ALT: 16 U/L / AST: 21 U/L / GGT: x                       I&O's Detail           Patient is a 87y old  Female who presents with a chief complaint of dizziness.    HPI:  88 yo female with h/o htn, dm, ckd (no on dialysis), hld presents to the ED c/o feeling disoriented x several days. Patient recently diagnosed with shingles, went to  on Saturday and put on valcylcovir 500 mg TIB. Since  she hasn't been feeling well. States she feels disoriented and out of it. Patient with rash to left upper back and left chest. Patient took one dose of  vicoden on Monday. Associated with decreased PO and difficulty ambulating. At baseline patient ambulates without assistance and lives on her own.    	pmd: Dr. Gann, renal: Dr. Cody    Renal consult called for SHER on CKD. History obtained from chart and patient's daughter at bedside.   Pt was taking 2 advils few times a day for last 3 days.     PAST MEDICAL HISTORY:  Hypertension    Hyperlipemia    Diabetes Mellitus Type II    GERD (Gastroesophageal Reflux Disease)    Lung Cancer    Irritable bowel syndrome with diarrhea    Orthostatic hypotension    Chronic kidney disease, unspecified CKD stage    Anemia    Rectal bleeding    Diverticulosis    H/O esophagitis    Hernia, hiatal    Hemorrhoids        PAST SURGICAL HISTORY:  Hypertension    Surgery, Elective    S/P cataract surgery    H/O blepharoplasty    H/O endoscopy    H/O colonoscopy        FAMILY HISTORY:  FH: type 2 diabetes (Father)    Family history of CVA (Mother, Sibling, Father)        SOCIAL HISTORY: No smoking or alcohol use     Allergies    No Known Allergies    Intolerances      Home Medications:  amLODIPine 5 mg oral tablet: 1 tab(s) orally once a day (2021 09:15)  Januvia 25 mg oral tablet: 1 tab(s) orally once a day (at bedtime) (2021 09:15)  magnesium gluconate 500 mg oral tablet: 1 tab(s) orally once a day (2021 09:15)  omeprazole 20 mg oral delayed release capsule: 1 cap(s) orally once a day (2021 09:15)  paricalcitol 1 mcg oral capsule: 1 tab(s) orally 3 times a week; Mon, Wed, Fri (2021 09:15)  Procrit: 1 dose(s) injectable every 6 weeks, As Needed (2021 09:15)  rosuvastatin 5 mg oral tablet: 2.5 milligram(s) orally once a day (2021 09:15)  Senna 8.6 mg oral tablet: 1 tab(s) orally once a day (at bedtime) (2021 09:15)  solifenacin 5 mg oral tablet: 1 tab(s) orally once a day (2021 09:15)  traZODone 150 mg oral tablet: 1 tab(s) orally once a day (at bedtime) (2021 09:15)  Vitamin B12 1000 mcg oral tablet: 1 tab(s) orally once a day (2021 09:15)    MEDICATIONS  (STANDING):  sodium chloride 0.9% Bolus 1000 milliLiter(s) IV Bolus Once    MEDICATIONS  (PRN):      REVIEW OF SYSTEMS:  General: weak  Respiratory: No cough, SOB  Cardiovascular: No CP or Palpitations	  Gastrointestinal: No nausea, Vomiting. No diarrhea  Genitourinary: No urinary complaints	  Musculoskeletal:  No new rash or lesions	  all other systems negative    T(F): 97.7 (22 @ 11:05), Max: 97.7 (22 @ 11:05)  HR: 89 (22 @ 11:05) (89 - 89)  BP: 140/84 (22 @ 11:05) (140/84 - 140/84)  RR: 16 (22 @ 11:05) (16 - 16)  SpO2: 99% (22 @ 11:05) (99% - 99%)  Wt(kg): --    PHYSICAL EXAM:  General: NAD  Respiratory: b/l air entry  Cardiovascular: S1 S2  Gastrointestinal: soft  Extremities: no edema            137  |  104  |  67<H>  ----------------------------<  131<H>  5.0   |  22  |  5.10<H>    Ca    9.5      31 Aug 2022 11:55  Mg     4.8         TPro  7.1  /  Alb  3.8  /  TBili  0.4  /  DBili  x   /  AST  21  /  ALT  16  /  AlkPhos  49                            10.5   13.41 )-----------( 412      ( 31 Aug 2022 11:55 )             33.3       Potassium, Serum: 5.0 mmol/L ( @ 11:55)  Blood Urea Nitrogen, Serum: 67 mg/dL ( @ 11:55)  Calcium, Total Serum: 9.5 mg/dL ( @ 11:55)  Hemoglobin: 10.5 g/dL ( @ 11:55)      Creatinine, Serum: 5.10 ( @ 11:55)        LIVER FUNCTIONS - ( 31 Aug 2022 11:55 )  Alb: 3.8 g/dL / Pro: 7.1 g/dL / ALK PHOS: 49 U/L / ALT: 16 U/L / AST: 21 U/L / GGT: x

## 2022-08-31 NOTE — ED ADULT NURSE NOTE - OBJECTIVE STATEMENT
Received after being dx with a shingles infection this past Monday.   Rash noted to L chest and L upper back.   Pt denies fevers at home.   Respirations even and unlabored.  Pt calm, reports improvement in weakness/dizziness symptoms.  Pt fully alert and cooperative with care.   Appears in no acute distress.  BN

## 2022-08-31 NOTE — H&P ADULT - NSHPPHYSICALEXAM_GEN_ALL_CORE
T(C): 36.7 (08-31-22 @ 14:36), Max: 36.7 (08-31-22 @ 14:36)  HR: 86 (08-31-22 @ 14:36) (86 - 89)  BP: 160/72 (08-31-22 @ 14:36) (140/84 - 160/72)  RR: 18 (08-31-22 @ 14:36) (16 - 18)  SpO2: 100% (08-31-22 @ 14:36) (99% - 100%)    GENERAL: patient appears well, no acute distress, appropriate, pleasant  EYES: sclera clear, no exudates  ENMT: oropharynx clear without erythema, no exudates, moist mucous membranes  NECK: supple, soft  LUNGS: good air entry bilaterally, clear to auscultation, symmetric breath sounds, no wheezing or rhonchi appreciated  HEART: soft S1/S2, regular rate and rhythm, no murmurs noted, no lower extremity edema. LUE palpable thrill (failed AVF site)  GASTROINTESTINAL: abdomen is soft, nontender, nondistended, normoactive bowel sounds, no palpable masses  INTEGUMENT: L sided T3 dermatomal rash that does cross midline,  c/w shingles   MUSCULOSKELETAL: no clubbing or cyanosis, no obvious deformity  NEUROLOGIC: awake, alert, oriented x3, good muscle tone in 4 extremities, no obvious sensory deficits  PSYCHIATRIC: mood is good, affect is congruent, linear and logical thought process  HEME/LYMPH: no obvious ecchymosis or petechiae

## 2022-08-31 NOTE — H&P ADULT - NSHPSOCIALHISTORY_GEN_ALL_CORE
Patient lives at home alone, ambulates independantly, and performs her ADLs on her own. She has a 40 pack year smoking history but has not smoked in 30 years. She does not drink or use illicit substances. She is retired.

## 2022-08-31 NOTE — H&P ADULT - PROBLEM SELECTOR PLAN 3
BUN/Cr 67/5.1, eGFR 8  avoid nephrotoxic meds  Continue IVF  Monitor BMP  Salt restriction  Nephrology following, f/u recs

## 2022-08-31 NOTE — ED PROVIDER NOTE - NSICDXPASTMEDICALHX_GEN_ALL_CORE_FT
PAST MEDICAL HISTORY:  Anemia     Chronic kidney disease, unspecified CKD stage     Diabetes Mellitus Type II     Diverticulosis     GERD (Gastroesophageal Reflux Disease)     H/O esophagitis     Hemorrhoids     Hernia, hiatal     Hyperlipemia     Hypertension     Irritable bowel syndrome with diarrhea     Orthostatic hypotension     Rectal bleeding

## 2022-08-31 NOTE — H&P ADULT - HISTORY OF PRESENT ILLNESS
88 y/o female with PMH of DM2, CKD, HTN, HLD, GERD presents to the ED with dizziness and disorientation of 3 days. This started on saturday when the patient called the daughter thinking she was having an allergic reaction because of a rash that appeared on her chest/back. After going to urgent care that day, she was diagnosed with shingles and prescribed Valtrex. Later that day she took advil and vicodin because of the pain from the rash, and became disoriented and sleepy. This persisted for the next 2 days, and she continued to take 2 advils every 4 hours until this AM, her physician called her and recommended going to the ER immediately out of concern for severe AMS. Of note, patient reports extremely poor oral intake of food/hydration during this time as she felt "too out of it to go to the kitchen". She has had episodes of vertigo in the past, but nothing like this before.    In the ED  Vitals: 140/84; HR 89, RR 16, T 97.7, o2 99%  Labs significant for WBC 13.4, Hb 10.5, BUN/Cr 67/5.1   Received: 3L NS, 300mg Gabapentin    Imaging:  CT Head  No acute hemorrhage or mass effect   Chest Xray  Small left midlung infiltrate; no cardiac findings  86 y/o female with PMH of DM2, CKD, HTN, HLD, GERD presents to the ED with dizziness and disorientation of 3 days. This started on saturday when the patient called the daughter thinking she was having an allergic reaction because of a rash that appeared on her chest/back. After going to urgent care that day, she was diagnosed with shingles and prescribed Valtrex. Later that day she took advil and vicodin because of the pain from the rash, and became disoriented and sleepy. This persisted for the next 2 days, and she continued to take 2 advils every 4 hours until this AM, her physician called her and recommended going to the ER immediately out of concern for severe AMS. Of note, patient reports extremely poor oral intake of food/hydration during this time as she felt "too out of it to go to the kitchen". She has had episodes of vertigo in the past, but nothing like this before.     In the ED  Vitals: 140/84; HR 89, RR 16, T 97.7, o2 99%  Labs significant for WBC 13.4, Hb 10.5, BUN/Cr 67/5.1, eGFR 8   Received: 3L NS, 300mg Gabapentin    Imaging:  CT Head  No acute hemorrhage or mass effect   Chest Xray  Small left midlung infiltrate; no cardiac findings

## 2022-08-31 NOTE — H&P ADULT - NSHPREVIEWOFSYSTEMS_GEN_ALL_CORE
CONSTITUTIONAL: denies fever, chills, fatigue, + weakness  HEENT: denies blurred visions, sore throat  SKIN: + rash on chest/back   CARDIOVASCULAR: denies chest pain, chest pressure, palpitations  RESPIRATORY: denies shortness of breath, sputum production  GASTROINTESTINAL: denies nausea, vomiting, diarrhea, abdominal pain. + constipation   GENITOURINARY: denies dysuria, discharge  NEUROLOGICAL: denies numbness, headache, focal weakness  MUSCULOSKELETAL: denies new joint pain, muscle aches  HEMATOLOGIC: denies gross bleeding, bruising  PSYCHIATRIC: denies recent changes in anxiety, depression  ENDOCRINOLOGIC: denies sweating, cold or heat intolerance

## 2022-08-31 NOTE — H&P ADULT - PROBLEM SELECTOR PLAN 2
Patient has dermatomal rash on the abdomen/back c/w shingles   Prescribed valtrex at outpatient urgent care   Currently has SHER, hold valtrex  begin Gabapentin 300mg q4h Patient has dermatomal rash on the abdomen/back c/w shingles   Prescribed valtrex at outpatient urgent care   Currently has SHER, hold valtrex  begin Gabapentin 300mg qd  Begin Tramadol 25mg q6h PRN for moderate pain   ID consulted (Michael); f/u recs regarding antiviral therapy

## 2022-08-31 NOTE — H&P ADULT - ATTENDING COMMENTS
86 y/o female with PMH GERD, HTN, HLD, DM, CKD p/w 3 day history of rash and AMS, found to have shingles and admitted for SHER     As per d/w Renal, will cont pt on IVF, monitor BMP, and holding valtrex for possible contribution to SHER. Will cont to f/u Renal recs. Will obtain ID eval given presence of shingles, and assess further management.  As pt also having difficulty ambulating and worried she can't care for herself at home, will obtain PT eval.      Phoenix Hopkins, Attending Physician

## 2022-08-31 NOTE — ED PROVIDER NOTE - OBJECTIVE STATEMENT
88 yo female with h/o htn, dm, ckd (no on dialysis), hld presents to the ED c/o feeling disoriented x several days. Patient recently diagnosed with shingles, went to  on Saturday and put on valcylcovir 500 mg TIB. Since  she hasn't been feeling well. States she feels disoriented and out of it. Patient with rash to left upper back and left chest. Patient took one dose of  vicoden on Monday. Associated with decreased PO and difficulty ambulating. At baseline patient ambulates without assistance and lives on her own.    pmd: Dr. Gann, renal: Dr. Cody

## 2022-08-31 NOTE — CONSULT NOTE ADULT - ASSESSMENT
88 y/o female with PMH of DM2, CKD, HTN, HLD, GERD presents to the ED with dizziness and disorientation. This started on saturday 8/27. She then went to [facility] urgent care that day, diagnosed with shingles and prescribed Valtrex. Later that day she took advil and vicodin because of the pain from the rash, and became disoriented and sleepy. Her physician called her and recommended going to the ER immediately out of concern for severe AMS. Of note, patient reports extremely poor oral intake of food/hydration during this time as she felt "too out of it to go to the kitchen". She has had episodes of vertigo in the past, but nothing like this before.     RECOMMENDATIONS  Compelling for shingles (Zoster) for pain control agree with management with Gabapentin  for antiviral Rx will do daily dosing with active rash  do not recommend steroids in this context  will order PCR testing
SHER pn CKD 4: Prerenal azotemia, NSAID nephropathy, Valacyclovir toxicity  Hypertension  Shingles  Anemia; on procrit as out pt    D/c valtrex. IV hydration. Monitor BP trend. Titrate BP meds as needed. Salt restriction. Continue retacrit as needed.   Avoid nephrotoxic meds as possible. Avoid ACEI, ARB, NSAIDs and IV contrast. Will follow electrolytes and renal function trend.   D/w pt's daughter at bedside. Further recommendations pending clinical course. Thank you for the courtesy of this referral.

## 2022-08-31 NOTE — ED ADULT NURSE REASSESSMENT NOTE - NSIMPLEMENTINTERV_GEN_ALL_ED
Implemented All Fall with Harm Risk Interventions:  [place] to call system. Call bell, personal items and telephone within reach. Instruct patient to call for assistance. Room bathroom lighting operational. Non-slip footwear when patient is off stretcher. Physically safe environment: no spills, clutter or unnecessary equipment. Stretcher in lowest position, wheels locked, appropriate side rails in place. Provide visual cue, wrist band, yellow gown, etc. Monitor gait and stability. Monitor for mental status changes and reorient to person, place, and time. Review medications for side effects contributing to fall risk. Reinforce activity limits and safety measures with patient and family. Provide visual clues: red socks.

## 2022-08-31 NOTE — ED PROVIDER NOTE - CLINICAL SUMMARY MEDICAL DECISION MAKING FREE TEXT BOX
Bozena: 88 yo female with h/o htn, dm, ckd (no on dialysis), hld presents to the ED c/o feeling disoriented x several days. Patient recently diagnosed with shingles, went to  on Saturday and put on valcylcovir 500 mg TIB. Since  she hasn't been feeling well. States she feels disoriented and out of it. Patient with rash to left upper back and left chest. Patient took one dose of  vicoden on Monday. Associated with decreased PO and difficulty ambulating. At baseline patient ambulates without assistance and lives on her own.

## 2022-08-31 NOTE — ED ADULT TRIAGE NOTE - CHIEF COMPLAINT QUOTE
pt has shingles since Saturday (on valacyclovir) and having pain- took vidicon at home and since then pt has been feeling "dizzy" and off

## 2022-08-31 NOTE — H&P ADULT - PROBLEM SELECTOR PLAN 1
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

## 2022-08-31 NOTE — ED PROVIDER NOTE - PROGRESS NOTE DETAILS
Patient evaluated by Dr. Anaya, renal, at bedside, recommends IVF at this time, agrees with plan of admission.

## 2022-08-31 NOTE — ED PROVIDER NOTE - CARE PLAN
Principal Discharge DX:	Acute-on-chronic kidney injury  Secondary Diagnosis:	Change in mental status   1

## 2022-09-01 PROBLEM — K64.9 UNSPECIFIED HEMORRHOIDS: Chronic | Status: ACTIVE | Noted: 2021-07-22

## 2022-09-01 PROBLEM — K44.9 DIAPHRAGMATIC HERNIA WITHOUT OBSTRUCTION OR GANGRENE: Chronic | Status: ACTIVE | Noted: 2021-07-22

## 2022-09-01 PROBLEM — Z87.19 PERSONAL HISTORY OF OTHER DISEASES OF THE DIGESTIVE SYSTEM: Chronic | Status: ACTIVE | Noted: 2021-07-22

## 2022-09-01 LAB
A1C WITH ESTIMATED AVERAGE GLUCOSE RESULT: 5.5 % — SIGNIFICANT CHANGE UP (ref 4–5.6)
ANION GAP SERPL CALC-SCNC: 5 MMOL/L — SIGNIFICANT CHANGE UP (ref 5–17)
ANION GAP SERPL CALC-SCNC: 6 MMOL/L — SIGNIFICANT CHANGE UP (ref 5–17)
BUN SERPL-MCNC: 63 MG/DL — HIGH (ref 7–23)
BUN SERPL-MCNC: 65 MG/DL — HIGH (ref 7–23)
CALCIUM SERPL-MCNC: 8 MG/DL — LOW (ref 8.5–10.1)
CALCIUM SERPL-MCNC: 8 MG/DL — LOW (ref 8.5–10.1)
CHLORIDE SERPL-SCNC: 112 MMOL/L — HIGH (ref 96–108)
CHLORIDE SERPL-SCNC: 112 MMOL/L — HIGH (ref 96–108)
CHOLEST SERPL-MCNC: 117 MG/DL — SIGNIFICANT CHANGE UP
CO2 SERPL-SCNC: 23 MMOL/L — SIGNIFICANT CHANGE UP (ref 22–31)
CO2 SERPL-SCNC: 23 MMOL/L — SIGNIFICANT CHANGE UP (ref 22–31)
CREAT SERPL-MCNC: 4.4 MG/DL — HIGH (ref 0.5–1.3)
CREAT SERPL-MCNC: 4.4 MG/DL — HIGH (ref 0.5–1.3)
CULTURE RESULTS: SIGNIFICANT CHANGE UP
EGFR: 9 ML/MIN/1.73M2 — LOW
EGFR: 9 ML/MIN/1.73M2 — LOW
ESTIMATED AVERAGE GLUCOSE: 111 MG/DL — SIGNIFICANT CHANGE UP (ref 68–114)
GLUCOSE SERPL-MCNC: 96 MG/DL — SIGNIFICANT CHANGE UP (ref 70–99)
GLUCOSE SERPL-MCNC: 96 MG/DL — SIGNIFICANT CHANGE UP (ref 70–99)
HCT VFR BLD CALC: 26.6 % — LOW (ref 34.5–45)
HDLC SERPL-MCNC: 30 MG/DL — LOW
HGB BLD-MCNC: 8.3 G/DL — LOW (ref 11.5–15.5)
LIPID PNL WITH DIRECT LDL SERPL: 66 MG/DL — SIGNIFICANT CHANGE UP
MCHC RBC-ENTMCNC: 28.9 PG — SIGNIFICANT CHANGE UP (ref 27–34)
MCHC RBC-ENTMCNC: 31.2 GM/DL — LOW (ref 32–36)
MCV RBC AUTO: 92.7 FL — SIGNIFICANT CHANGE UP (ref 80–100)
NON HDL CHOLESTEROL: 87 MG/DL — SIGNIFICANT CHANGE UP
NRBC # BLD: 0 /100 WBCS — SIGNIFICANT CHANGE UP (ref 0–0)
PLATELET # BLD AUTO: 324 K/UL — SIGNIFICANT CHANGE UP (ref 150–400)
POTASSIUM SERPL-MCNC: 4.5 MMOL/L — SIGNIFICANT CHANGE UP (ref 3.5–5.3)
POTASSIUM SERPL-MCNC: 4.5 MMOL/L — SIGNIFICANT CHANGE UP (ref 3.5–5.3)
POTASSIUM SERPL-SCNC: 4.5 MMOL/L — SIGNIFICANT CHANGE UP (ref 3.5–5.3)
POTASSIUM SERPL-SCNC: 4.5 MMOL/L — SIGNIFICANT CHANGE UP (ref 3.5–5.3)
RBC # BLD: 2.87 M/UL — LOW (ref 3.8–5.2)
RBC # FLD: 14.3 % — SIGNIFICANT CHANGE UP (ref 10.3–14.5)
SODIUM SERPL-SCNC: 140 MMOL/L — SIGNIFICANT CHANGE UP (ref 135–145)
SODIUM SERPL-SCNC: 141 MMOL/L — SIGNIFICANT CHANGE UP (ref 135–145)
SPECIMEN SOURCE: SIGNIFICANT CHANGE UP
TRIGL SERPL-MCNC: 106 MG/DL — SIGNIFICANT CHANGE UP
WBC # BLD: 6.64 K/UL — SIGNIFICANT CHANGE UP (ref 3.8–10.5)
WBC # FLD AUTO: 6.64 K/UL — SIGNIFICANT CHANGE UP (ref 3.8–10.5)

## 2022-09-01 PROCEDURE — 99232 SBSQ HOSP IP/OBS MODERATE 35: CPT

## 2022-09-01 RX ORDER — SODIUM CHLORIDE 9 MG/ML
1000 INJECTION INTRAMUSCULAR; INTRAVENOUS; SUBCUTANEOUS
Refills: 0 | Status: DISCONTINUED | OUTPATIENT
Start: 2022-09-01 | End: 2022-09-02

## 2022-09-01 RX ORDER — TRAMADOL HYDROCHLORIDE 50 MG/1
25 TABLET ORAL
Refills: 0 | Status: DISCONTINUED | OUTPATIENT
Start: 2022-09-01 | End: 2022-09-02

## 2022-09-01 RX ORDER — LIDOCAINE 4 G/100G
1 CREAM TOPICAL DAILY
Refills: 0 | Status: DISCONTINUED | OUTPATIENT
Start: 2022-09-01 | End: 2022-09-02

## 2022-09-01 RX ORDER — TRAMADOL HYDROCHLORIDE 50 MG/1
50 TABLET ORAL
Refills: 0 | Status: DISCONTINUED | OUTPATIENT
Start: 2022-09-01 | End: 2022-09-02

## 2022-09-01 RX ADMIN — HEPARIN SODIUM 5000 UNIT(S): 5000 INJECTION INTRAVENOUS; SUBCUTANEOUS at 14:00

## 2022-09-01 RX ADMIN — TRAMADOL HYDROCHLORIDE 25 MILLIGRAM(S): 50 TABLET ORAL at 15:07

## 2022-09-01 RX ADMIN — Medication 150 MILLIGRAM(S): at 21:42

## 2022-09-01 RX ADMIN — Medication 650 MILLIGRAM(S): at 07:07

## 2022-09-01 RX ADMIN — AMLODIPINE BESYLATE 5 MILLIGRAM(S): 2.5 TABLET ORAL at 07:07

## 2022-09-01 RX ADMIN — ATORVASTATIN CALCIUM 20 MILLIGRAM(S): 80 TABLET, FILM COATED ORAL at 21:42

## 2022-09-01 RX ADMIN — Medication 650 MILLIGRAM(S): at 12:49

## 2022-09-01 RX ADMIN — GABAPENTIN 300 MILLIGRAM(S): 400 CAPSULE ORAL at 12:49

## 2022-09-01 RX ADMIN — Medication 650 MILLIGRAM(S): at 00:15

## 2022-09-01 RX ADMIN — HEPARIN SODIUM 5000 UNIT(S): 5000 INJECTION INTRAVENOUS; SUBCUTANEOUS at 07:07

## 2022-09-01 RX ADMIN — TRAMADOL HYDROCHLORIDE 25 MILLIGRAM(S): 50 TABLET ORAL at 12:50

## 2022-09-01 RX ADMIN — PREGABALIN 1000 MICROGRAM(S): 225 CAPSULE ORAL at 12:48

## 2022-09-01 RX ADMIN — TRAMADOL HYDROCHLORIDE 25 MILLIGRAM(S): 50 TABLET ORAL at 17:15

## 2022-09-01 RX ADMIN — Medication 5 MILLIGRAM(S): at 17:10

## 2022-09-01 RX ADMIN — Medication 105 MILLIGRAM(S): at 17:55

## 2022-09-01 RX ADMIN — HEPARIN SODIUM 5000 UNIT(S): 5000 INJECTION INTRAVENOUS; SUBCUTANEOUS at 21:42

## 2022-09-01 RX ADMIN — Medication 650 MILLIGRAM(S): at 17:10

## 2022-09-01 RX ADMIN — LIDOCAINE 1 PATCH: 4 CREAM TOPICAL at 20:01

## 2022-09-01 RX ADMIN — PANTOPRAZOLE SODIUM 40 MILLIGRAM(S): 20 TABLET, DELAYED RELEASE ORAL at 07:21

## 2022-09-01 RX ADMIN — LIDOCAINE 1 PATCH: 4 CREAM TOPICAL at 17:26

## 2022-09-01 RX ADMIN — MAGNESIUM OXIDE 400 MG ORAL TABLET 400 MILLIGRAM(S): 241.3 TABLET ORAL at 12:49

## 2022-09-01 RX ADMIN — SODIUM CHLORIDE 60 MILLILITER(S): 9 INJECTION INTRAMUSCULAR; INTRAVENOUS; SUBCUTANEOUS at 17:11

## 2022-09-01 RX ADMIN — Medication 5 MILLIGRAM(S): at 07:07

## 2022-09-01 NOTE — PATIENT PROFILE ADULT - FALL HARM RISK - HARM RISK INTERVENTIONS

## 2022-09-01 NOTE — ED ADULT NURSE REASSESSMENT NOTE - NS ED NURSE REASSESS COMMENT FT1
Patient had lunch and tolerated well.
Patient is resting comfortably. Patient had dinner and tolerated well.
Patient received from day shift, AOx4, ambulatory without assistance from bed to bedside commode, states in no acute distress at this time. Awaiting bed assignment, will continue to monitor.
Received the patient in the Er at the time of change of shift. Patient is sleeping comfortably. easily arousable. Pending for bed availability.
Assumed care of Pt from previous RN, Pt awaiting admission bed, currently sitting comfortably on stretcher eating dinner, daughter at bedside, VSS, will continue to monitor closely.

## 2022-09-01 NOTE — PROGRESS NOTE ADULT - SUBJECTIVE AND OBJECTIVE BOX
Patient is a 87y old  Female who presents with a chief complaint of SHER (01 Sep 2022 15:47)    Patient seen in follow up for SHER on CKD.        PAST MEDICAL HISTORY:  Hypertension    Hyperlipemia    Diabetes Mellitus Type II    GERD (Gastroesophageal Reflux Disease)    Lung Cancer    Irritable bowel syndrome with diarrhea    Orthostatic hypotension    Chronic kidney disease, unspecified CKD stage    Anemia    Rectal bleeding    Diverticulosis    H/O esophagitis    Hernia, hiatal    Hemorrhoids      MEDICATIONS  (STANDING):  acyclovir IVPB 250 milliGRAM(s) IV Intermittent every 24 hours  amLODIPine   Tablet 5 milliGRAM(s) Oral daily  atorvastatin 20 milliGRAM(s) Oral at bedtime  cyanocobalamin 1000 MICROGram(s) Oral daily  dextrose 5%. 1000 milliLiter(s) (50 mL/Hr) IV Continuous <Continuous>  dextrose 5%. 1000 milliLiter(s) (100 mL/Hr) IV Continuous <Continuous>  dextrose 50% Injectable 25 Gram(s) IV Push once  dextrose 50% Injectable 12.5 Gram(s) IV Push once  dextrose 50% Injectable 25 Gram(s) IV Push once  gabapentin 300 milliGRAM(s) Oral daily  glucagon  Injectable 1 milliGRAM(s) IntraMuscular once  heparin   Injectable 5000 Unit(s) SubCutaneous every 8 hours  insulin lispro (ADMELOG) corrective regimen sliding scale   SubCutaneous three times a day before meals  lidocaine   4% Patch 1 Patch Transdermal daily  lidocaine   4% Patch 1 Patch Transdermal daily  magnesium oxide 400 milliGRAM(s) Oral daily  oxybutynin 5 milliGRAM(s) Oral two times a day  pantoprazole    Tablet 40 milliGRAM(s) Oral before breakfast  sodium bicarbonate 650 milliGRAM(s) Oral four times a day  sodium chloride 0.9%. 1000 milliLiter(s) (60 mL/Hr) IV Continuous <Continuous>  traMADol 25 milliGRAM(s) Oral four times a day  traZODone 150 milliGRAM(s) Oral at bedtime    MEDICATIONS  (PRN):  dextrose Oral Gel 15 Gram(s) Oral once PRN Blood Glucose LESS THAN 70 milliGRAM(s)/deciliter  traMADol 50 milliGRAM(s) Oral two times a day PRN severe breakthrough pain    T(C): 36.1 (22 @ 07:23), Max: 36.8 (22 @ 22:02)  HR: 78 (22 @ 12:00) (74 - 90)  BP: 163/70 (22 @ 15:03) (126/55 - 172/72)  RR: 16 (22 @ 07:23) (16 - 19)  SpO2: 96% (22 @ 07:23) (96% - 100%)  Wt(kg): --  I&O's Detail      PHYSICAL EXAM:  General: No distress  Respiratory: b/l air entry  Cardiovascular: S1 S2  Gastrointestinal: soft  Extremities: no edema                              8.3    6.64  )-----------( 324      ( 01 Sep 2022 04:57 )             26.6         140  |  112<H>  |  65<H>  ----------------------------<  96  4.5   |  23  |  4.40<H>    Ca    8.0<L>      01 Sep 2022 04:57  Mg     4.8         TPro  7.1  /  Alb  3.8  /  TBili  0.4  /  DBili  x   /  AST  21  /  ALT  16  /  AlkPhos  49          LIVER FUNCTIONS - ( 31 Aug 2022 11:55 )  Alb: 3.8 g/dL / Pro: 7.1 g/dL / ALK PHOS: 49 U/L / ALT: 16 U/L / AST: 21 U/L / GGT: x           Urinalysis Basic - ( 31 Aug 2022 13:39 )    Color: Yellow / Appearance: Clear / S.010 / pH: x  Gluc: x / Ketone: Trace  / Bili: Negative / Urobili: Negative   Blood: x / Protein: 100 / Nitrite: Negative   Leuk Esterase: Negative / RBC: 0-2 /HPF / WBC 0-2   Sq Epi: x / Non Sq Epi: Occasional / Bacteria: x        Sodium, Serum: 140 ( @ 04:57)  Sodium, Serum: 137 ( @ 11:55)    Creatinine, Serum: 4.40 ( @ 04:57)  Creatinine, Serum: 5.10 ( @ 11:55)    Potassium, Serum: 4.5 ( @ 04:57)  Potassium, Serum: 5.0 ( @ 11:55)    Hemoglobin: 8.3 (09-01 @ 04:57)  Hemoglobin: 10.5 ( @ 11:55)

## 2022-09-01 NOTE — PROGRESS NOTE ADULT - SUBJECTIVE AND OBJECTIVE BOX
SUBJECTIVE:    No acute events overnight, afebrile, hds.    VITAL SIGNS:    Vital Signs Last 24 Hrs  T(C): 36.1 (01 Sep 2022 07:23), Max: 36.8 (31 Aug 2022 22:02)  T(F): 97 (01 Sep 2022 07:23), Max: 98.3 (01 Sep 2022 05:48)  HR: 74 (01 Sep 2022 07:23) (74 - 90)  BP: 130/60 (01 Sep 2022 07:23) (130/60 - 172/72)  BP(mean): --  RR: 16 (01 Sep 2022 07:23) (16 - 19)  SpO2: 96% (01 Sep 2022 07:23) (96% - 100%)    Parameters below as of 01 Sep 2022 07:23  Patient On (Oxygen Delivery Method): room air        PHYSICAL EXAM:     GENERAL: no acute distress  HEENT: NC/AT, EOMI, neck supple, MMM  RESPIRATORY: LCTAB/L, no rhonchi, rales, or wheezing  CARDIOVASCULAR: RRR, no murmurs, gallops, rubs  ABDOMINAL: soft, non-tender, non-distended, positive bowel sounds   EXTREMITIES: no clubbing, cyanosis, or edema  NEUROLOGICAL: alert and oriented x 3, non-focal  SKIN: no rashes or lesions   MUSCULOSKELETAL: no gross joint deformity                          8.3    6.64  )-----------( 324      ( 01 Sep 2022 04:57 )             26.6     09-01    140  |  112<H>  |  65<H>  ----------------------------<  96  4.5   |  23  |  4.40<H>    Ca    8.0<L>      01 Sep 2022 04:57  Mg     4.8     08-31    TPro  7.1  /  Alb  3.8  /  TBili  0.4  /  DBili  x   /  AST  21  /  ALT  16  /  AlkPhos  49  08-31      CAPILLARY BLOOD GLUCOSE      POCT Blood Glucose.: 91 mg/dL (01 Sep 2022 08:18)  POCT Blood Glucose.: 110 mg/dL (31 Aug 2022 18:03)      MEDICATIONS  (STANDING):  acyclovir IVPB 250 milliGRAM(s) IV Intermittent every 24 hours  amLODIPine   Tablet 5 milliGRAM(s) Oral daily  atorvastatin 20 milliGRAM(s) Oral at bedtime  cyanocobalamin 1000 MICROGram(s) Oral daily  dextrose 5%. 1000 milliLiter(s) (50 mL/Hr) IV Continuous <Continuous>  dextrose 5%. 1000 milliLiter(s) (100 mL/Hr) IV Continuous <Continuous>  dextrose 50% Injectable 25 Gram(s) IV Push once  dextrose 50% Injectable 12.5 Gram(s) IV Push once  dextrose 50% Injectable 25 Gram(s) IV Push once  gabapentin 300 milliGRAM(s) Oral daily  glucagon  Injectable 1 milliGRAM(s) IntraMuscular once  heparin   Injectable 5000 Unit(s) SubCutaneous every 8 hours  insulin lispro (ADMELOG) corrective regimen sliding scale   SubCutaneous three times a day before meals  magnesium oxide 400 milliGRAM(s) Oral daily  oxybutynin 5 milliGRAM(s) Oral two times a day  pantoprazole    Tablet 40 milliGRAM(s) Oral before breakfast  sodium bicarbonate 650 milliGRAM(s) Oral four times a day  sodium chloride 0.9%. 1000 milliLiter(s) (75 mL/Hr) IV Continuous <Continuous>  sodium chloride 0.9%. 1000 milliLiter(s) (75 mL/Hr) IV Continuous <Continuous>  traZODone 150 milliGRAM(s) Oral at bedtime       SUBJECTIVE:    No acute events overnight, afebrile, hds.    VITAL SIGNS:    Vital Signs Last 24 Hrs  T(C): 36.1 (01 Sep 2022 07:23), Max: 36.8 (31 Aug 2022 22:02)  T(F): 97 (01 Sep 2022 07:23), Max: 98.3 (01 Sep 2022 05:48)  HR: 74 (01 Sep 2022 07:23) (74 - 90)  BP: 130/60 (01 Sep 2022 07:23) (130/60 - 172/72)  BP(mean): --  RR: 16 (01 Sep 2022 07:23) (16 - 19)  SpO2: 96% (01 Sep 2022 07:23) (96% - 100%)    Parameters below as of 01 Sep 2022 07:23  Patient On (Oxygen Delivery Method): room air        PHYSICAL EXAM:     GENERAL: no acute distress  HEENT: NC/AT, EOMI, neck supple, MMM  RESPIRATORY: LCTAB/L, no rhonchi, rales, or wheezing  CARDIOVASCULAR: RRR, no murmurs, gallops, rubs  ABDOMINAL: soft, non-tender, non-distended, positive bowel sounds   EXTREMITIES: no clubbing, cyanosis, or edema  NEUROLOGICAL: alert and oriented x 3, non-focal  SKIN: rah on back and left chest, with erythema   MUSCULOSKELETAL: no gross joint deformity                          8.3    6.64  )-----------( 324      ( 01 Sep 2022 04:57 )             26.6     09-01    140  |  112<H>  |  65<H>  ----------------------------<  96  4.5   |  23  |  4.40<H>    Ca    8.0<L>      01 Sep 2022 04:57  Mg     4.8     08-31    TPro  7.1  /  Alb  3.8  /  TBili  0.4  /  DBili  x   /  AST  21  /  ALT  16  /  AlkPhos  49  08-31      CAPILLARY BLOOD GLUCOSE      POCT Blood Glucose.: 91 mg/dL (01 Sep 2022 08:18)  POCT Blood Glucose.: 110 mg/dL (31 Aug 2022 18:03)      MEDICATIONS  (STANDING):  acyclovir IVPB 250 milliGRAM(s) IV Intermittent every 24 hours  amLODIPine   Tablet 5 milliGRAM(s) Oral daily  atorvastatin 20 milliGRAM(s) Oral at bedtime  cyanocobalamin 1000 MICROGram(s) Oral daily  dextrose 5%. 1000 milliLiter(s) (50 mL/Hr) IV Continuous <Continuous>  dextrose 5%. 1000 milliLiter(s) (100 mL/Hr) IV Continuous <Continuous>  dextrose 50% Injectable 25 Gram(s) IV Push once  dextrose 50% Injectable 12.5 Gram(s) IV Push once  dextrose 50% Injectable 25 Gram(s) IV Push once  gabapentin 300 milliGRAM(s) Oral daily  glucagon  Injectable 1 milliGRAM(s) IntraMuscular once  heparin   Injectable 5000 Unit(s) SubCutaneous every 8 hours  insulin lispro (ADMELOG) corrective regimen sliding scale   SubCutaneous three times a day before meals  magnesium oxide 400 milliGRAM(s) Oral daily  oxybutynin 5 milliGRAM(s) Oral two times a day  pantoprazole    Tablet 40 milliGRAM(s) Oral before breakfast  sodium bicarbonate 650 milliGRAM(s) Oral four times a day  sodium chloride 0.9%. 1000 milliLiter(s) (75 mL/Hr) IV Continuous <Continuous>  sodium chloride 0.9%. 1000 milliLiter(s) (75 mL/Hr) IV Continuous <Continuous>  traZODone 150 milliGRAM(s) Oral at bedtime

## 2022-09-01 NOTE — PROGRESS NOTE ADULT - PROBLEM SELECTOR PLAN 2
Patient has dermatomal rash on the abdomen/back c/w shingles   Prescribed valtrex at outpatient urgent care   Currently has SHER, hold valtrex  begin Gabapentin 300mg qd  Begin Tramadol 25mg q6h PRN for moderate pain   ID consulted (Michael); f/u recs regarding antiviral therapy Patient has dermatomal rash on the abdomen/back c/w shingles   Prescribed valtrex at outpatient urgent care   Currently has SHER, hold valtrex; as per ID starte don daily dosing of acyclovir  begin Gabapentin 300mg qd  Begin Tramadol 25mg q6h PRN for moderate pain   ID consulted (Michael); cont to f/u recs Patient has dermatomal rash on the abdomen/back c/w shingles   Prescribed valtrex at outpatient urgent care   presented with SHER, possibly 2/2 valtrex; holding valtrex; as per ID started on daily dosing of acyclovir  begin Gabapentin 300mg qd  Pt c/o pain, will change tramadol to ATC  ID consulted (Michael); cont to f/u recs  PT eval to assess needs for Rehab, pt was unsteady when ambulating in ED on presentation

## 2022-09-01 NOTE — PROGRESS NOTE ADULT - SUBJECTIVE AND OBJECTIVE BOX
[facility] DIVISION of INFECTIOUS DISEASE  Isaac Rodriguez MD PhD, Courtney Vasquez MD, Tami Seals MD, Jeremias Morales MD, Andreas Khalil MD  and providing coverage with Annette Vazquez MD  Providing Infectious Disease Consultations at [facility], [facility], [facility]'s    Office# [phone] to schedule follow up appointments  Answering Service for urgent calls or New Consults [phone]  Cell# to text for urgent issues Isaac Rodriguez [phone]     infectious diseases progress note:    MONIKA AGEE is a 87y y. o. Female patient    Overnight and events of the last 24hrs reviewed    Allergies    No Known Allergies    Intolerances        ANTIBIOTICS/RELEVANT:  antimicrobials  acyclovir IVPB 250 milliGRAM(s) IV Intermittent every 24 hours    immunologic:    OTHER:  amLODIPine   Tablet 5 milliGRAM(s) Oral daily  atorvastatin 20 milliGRAM(s) Oral at bedtime  cyanocobalamin 1000 MICROGram(s) Oral daily  dextrose 5%. 1000 milliLiter(s) IV Continuous <Continuous>  dextrose 5%. 1000 milliLiter(s) IV Continuous <Continuous>  dextrose 50% Injectable 25 Gram(s) IV Push once  dextrose 50% Injectable 12.5 Gram(s) IV Push once  dextrose 50% Injectable 25 Gram(s) IV Push once  dextrose Oral Gel 15 Gram(s) Oral once PRN  gabapentin 300 milliGRAM(s) Oral daily  glucagon  Injectable 1 milliGRAM(s) IntraMuscular once  heparin   Injectable 5000 Unit(s) SubCutaneous every 8 hours  insulin lispro (ADMELOG) corrective regimen sliding scale   SubCutaneous three times a day before meals  magnesium oxide 400 milliGRAM(s) Oral daily  oxybutynin 5 milliGRAM(s) Oral two times a day  pantoprazole    Tablet 40 milliGRAM(s) Oral before breakfast  sodium bicarbonate 650 milliGRAM(s) Oral four times a day  sodium chloride 0.9%. 1000 milliLiter(s) IV Continuous <Continuous>  traMADol 25 milliGRAM(s) Oral four times a day  traMADol 50 milliGRAM(s) Oral two times a day PRN  traZODone 150 milliGRAM(s) Oral at bedtime      Objective:  Vital Signs Last 24 Hrs  T(C): 36.1 (01 Sep 2022 07:23), Max: 36.8 (31 Aug 2022 22:02)  T(F): 97 (01 Sep 2022 07:23), Max: 98.3 (01 Sep 2022 05:48)  HR: 78 (01 Sep 2022 12:00) (74 - 90)  BP: 163/70 (01 Sep 2022 15:03) (126/55 - 172/72)  BP(mean): --  RR: 16 (01 Sep 2022 07:23) (16 - 19)  SpO2: 96% (01 Sep 2022 07:23) (96% - 100%)    Parameters below as of 01 Sep 2022 07:23  Patient On (Oxygen Delivery Method): room air        T(C): 36.1 (22 @ 07:23), Max: 36.8 (22 @ 22:02)  T(C): 36.1 (22 @ 07:23), Max: 36.8 (22 @ 22:02)  T(C): 36.1 (22 @ 07:23), Max: 36.8 (22 @ 22:02)    PHYSICAL EXAM:  HEENT: NC atraumatic  Neck: supple  Respiratory: no accessory muscle use, breathing comfortably  Cardiovascular: distant  Gastrointestinal: normal appearing, nondistended  Extremities: no clubbing, no cyanosis,   Skin: dermatomal on chest and back dry no active lesions      LABS:                          8.3    6.64  )-----------( 324      ( 01 Sep 2022 04:57 )             26.6       WBC  6.64  @ 04:57  13.41  @ 11:55          140  |  112<H>  |  65<H>  ----------------------------<  96  4.5   |  23  |  4.40<H>    Ca    8.0<L>      01 Sep 2022 04:57  Mg     4.8         TPro  7.1  /  Alb  3.8  /  TBili  0.4  /  DBili  x   /  AST  21  /  ALT  16  /  AlkPhos  49        Creatinine, Serum: 4.40 mg/dL (22 @ 04:57)  Creatinine, Serum: 4.40 mg/dL (22 @ 04:57)  Creatinine, Serum: 5.10 mg/dL (22 @ 11:55)        Urinalysis Basic - ( 31 Aug 2022 13:39 )    Color: Yellow / Appearance: Clear / S.010 / pH: x  Gluc: x / Ketone: Trace  / Bili: Negative / Urobili: Negative   Blood: x / Protein: 100 / Nitrite: Negative   Leuk Esterase: Negative / RBC: 0-2 /HPF / WBC 0-2   Sq Epi: x / Non Sq Epi: Occasional / Bacteria: x            INFLAMMATORY MARKERS  Auto Neutrophil #: 12.20 K/uL (22 @ 11:55)  Auto Lymphocyte #: 0.40 K/uL (22 @ 11:55)      Auto Eosinophil #: 0.00 K/uL (22 @ 11:55)                                MICROBIOLOGY:              RADIOLOGY & ADDITIONAL STUDIES:

## 2022-09-01 NOTE — PATIENT PROFILE ADULT - STATED REASON FOR ADMISSION
as per pt, on Saturday, I felt uncomfortable, I spoke to my daughter who has shingle and told me I had shingle. I went to urgent care. I took some meds and still didn't feel right. and somewhat felt disoriented.

## 2022-09-01 NOTE — PROGRESS NOTE ADULT - PROBLEM SELECTOR PLAN 6
Hold januvia   Start LDSS with accuchecks and hypoglycemic protocol Hold januvia   Start LD coverage scale with accuchecks and hypoglycemic protocol

## 2022-09-01 NOTE — PROGRESS NOTE ADULT - PROBLEM SELECTOR PLAN 1
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

## 2022-09-01 NOTE — PHYSICAL THERAPY INITIAL EVALUATION ADULT - PERTINENT HX OF CURRENT PROBLEM, REHAB EVAL
88 y/o F presents to the ED with dizziness and disorientation of 3 days. Pt reprt rash that appeared on her chest/back. Pt was diagnosed with shingles. Pt has had episodes of vertigo in the past. CT Head: No acute hemorrhage or mass effect. CXR: Small left midlung infiltrate. CT (head)  No acute hemorrhage mass or mass effect.

## 2022-09-02 ENCOUNTER — TRANSCRIPTION ENCOUNTER (OUTPATIENT)
Age: 87
End: 2022-09-02

## 2022-09-02 VITALS
HEART RATE: 81 BPM | SYSTOLIC BLOOD PRESSURE: 124 MMHG | RESPIRATION RATE: 18 BRPM | OXYGEN SATURATION: 98 % | TEMPERATURE: 97 F | DIASTOLIC BLOOD PRESSURE: 70 MMHG

## 2022-09-02 LAB
ANION GAP SERPL CALC-SCNC: 7 MMOL/L — SIGNIFICANT CHANGE UP (ref 5–17)
BUN SERPL-MCNC: 55 MG/DL — HIGH (ref 7–23)
CALCIUM SERPL-MCNC: 8.3 MG/DL — LOW (ref 8.5–10.1)
CHLORIDE SERPL-SCNC: 112 MMOL/L — HIGH (ref 96–108)
CO2 SERPL-SCNC: 23 MMOL/L — SIGNIFICANT CHANGE UP (ref 22–31)
CREAT SERPL-MCNC: 4 MG/DL — HIGH (ref 0.5–1.3)
EGFR: 10 ML/MIN/1.73M2 — LOW
GLUCOSE SERPL-MCNC: 105 MG/DL — HIGH (ref 70–99)
HCT VFR BLD CALC: 27.9 % — LOW (ref 34.5–45)
HGB BLD-MCNC: 8.6 G/DL — LOW (ref 11.5–15.5)
MCHC RBC-ENTMCNC: 29 PG — SIGNIFICANT CHANGE UP (ref 27–34)
MCHC RBC-ENTMCNC: 30.8 GM/DL — LOW (ref 32–36)
MCV RBC AUTO: 93.9 FL — SIGNIFICANT CHANGE UP (ref 80–100)
NRBC # BLD: 0 /100 WBCS — SIGNIFICANT CHANGE UP (ref 0–0)
PLATELET # BLD AUTO: 287 K/UL — SIGNIFICANT CHANGE UP (ref 150–400)
POTASSIUM SERPL-MCNC: 4.5 MMOL/L — SIGNIFICANT CHANGE UP (ref 3.5–5.3)
POTASSIUM SERPL-SCNC: 4.5 MMOL/L — SIGNIFICANT CHANGE UP (ref 3.5–5.3)
RBC # BLD: 2.97 M/UL — LOW (ref 3.8–5.2)
RBC # FLD: 14.4 % — SIGNIFICANT CHANGE UP (ref 10.3–14.5)
SODIUM SERPL-SCNC: 142 MMOL/L — SIGNIFICANT CHANGE UP (ref 135–145)
WBC # BLD: 7.09 K/UL — SIGNIFICANT CHANGE UP (ref 3.8–10.5)
WBC # FLD AUTO: 7.09 K/UL — SIGNIFICANT CHANGE UP (ref 3.8–10.5)

## 2022-09-02 PROCEDURE — 87635 SARS-COV-2 COVID-19 AMP PRB: CPT

## 2022-09-02 PROCEDURE — G1004: CPT

## 2022-09-02 PROCEDURE — 80048 BASIC METABOLIC PNL TOTAL CA: CPT

## 2022-09-02 PROCEDURE — 83735 ASSAY OF MAGNESIUM: CPT

## 2022-09-02 PROCEDURE — 99285 EMERGENCY DEPT VISIT HI MDM: CPT

## 2022-09-02 PROCEDURE — 82962 GLUCOSE BLOOD TEST: CPT

## 2022-09-02 PROCEDURE — 80061 LIPID PANEL: CPT

## 2022-09-02 PROCEDURE — 93005 ELECTROCARDIOGRAM TRACING: CPT

## 2022-09-02 PROCEDURE — 36415 COLL VENOUS BLD VENIPUNCTURE: CPT

## 2022-09-02 PROCEDURE — 70450 CT HEAD/BRAIN W/O DYE: CPT | Mod: ME

## 2022-09-02 PROCEDURE — 80053 COMPREHEN METABOLIC PANEL: CPT

## 2022-09-02 PROCEDURE — 83036 HEMOGLOBIN GLYCOSYLATED A1C: CPT

## 2022-09-02 PROCEDURE — 99239 HOSP IP/OBS DSCHRG MGMT >30: CPT

## 2022-09-02 PROCEDURE — 85027 COMPLETE CBC AUTOMATED: CPT

## 2022-09-02 PROCEDURE — 87086 URINE CULTURE/COLONY COUNT: CPT

## 2022-09-02 PROCEDURE — 81001 URINALYSIS AUTO W/SCOPE: CPT

## 2022-09-02 PROCEDURE — 97162 PT EVAL MOD COMPLEX 30 MIN: CPT

## 2022-09-02 PROCEDURE — 85025 COMPLETE CBC W/AUTO DIFF WBC: CPT

## 2022-09-02 PROCEDURE — 71045 X-RAY EXAM CHEST 1 VIEW: CPT

## 2022-09-02 RX ORDER — VALACYCLOVIR 500 MG/1
1 TABLET, FILM COATED ORAL
Qty: 0 | Refills: 0 | DISCHARGE

## 2022-09-02 RX ORDER — TRAMADOL HYDROCHLORIDE 50 MG/1
0.5 TABLET ORAL
Qty: 14 | Refills: 0
Start: 2022-09-02 | End: 2022-09-08

## 2022-09-02 RX ORDER — INFLUENZA VIRUS VACCINE 15; 15; 15; 15 UG/.5ML; UG/.5ML; UG/.5ML; UG/.5ML
0.7 SUSPENSION INTRAMUSCULAR ONCE
Refills: 0 | Status: DISCONTINUED | OUTPATIENT
Start: 2022-09-01 | End: 2022-09-02

## 2022-09-02 RX ORDER — GABAPENTIN 400 MG/1
1 CAPSULE ORAL
Qty: 0 | Refills: 0 | DISCHARGE
Start: 2022-09-02

## 2022-09-02 RX ORDER — LIDOCAINE 4 G/100G
1 CREAM TOPICAL
Qty: 0 | Refills: 0 | DISCHARGE
Start: 2022-09-02

## 2022-09-02 RX ORDER — GABAPENTIN 400 MG/1
1 CAPSULE ORAL
Qty: 7 | Refills: 0
Start: 2022-09-02 | End: 2022-09-08

## 2022-09-02 RX ORDER — TRAMADOL HYDROCHLORIDE 50 MG/1
0.5 TABLET ORAL
Qty: 0 | Refills: 0 | DISCHARGE
Start: 2022-09-02

## 2022-09-02 RX ADMIN — PANTOPRAZOLE SODIUM 40 MILLIGRAM(S): 20 TABLET, DELAYED RELEASE ORAL at 05:47

## 2022-09-02 RX ADMIN — HEPARIN SODIUM 5000 UNIT(S): 5000 INJECTION INTRAVENOUS; SUBCUTANEOUS at 13:48

## 2022-09-02 RX ADMIN — PREGABALIN 1000 MICROGRAM(S): 225 CAPSULE ORAL at 13:48

## 2022-09-02 RX ADMIN — Medication 650 MILLIGRAM(S): at 13:48

## 2022-09-02 RX ADMIN — HEPARIN SODIUM 5000 UNIT(S): 5000 INJECTION INTRAVENOUS; SUBCUTANEOUS at 05:46

## 2022-09-02 RX ADMIN — TRAMADOL HYDROCHLORIDE 25 MILLIGRAM(S): 50 TABLET ORAL at 13:50

## 2022-09-02 RX ADMIN — MAGNESIUM OXIDE 400 MG ORAL TABLET 400 MILLIGRAM(S): 241.3 TABLET ORAL at 13:48

## 2022-09-02 RX ADMIN — TRAMADOL HYDROCHLORIDE 25 MILLIGRAM(S): 50 TABLET ORAL at 05:46

## 2022-09-02 RX ADMIN — LIDOCAINE 1 PATCH: 4 CREAM TOPICAL at 13:49

## 2022-09-02 RX ADMIN — TRAMADOL HYDROCHLORIDE 25 MILLIGRAM(S): 50 TABLET ORAL at 07:28

## 2022-09-02 RX ADMIN — AMLODIPINE BESYLATE 5 MILLIGRAM(S): 2.5 TABLET ORAL at 05:46

## 2022-09-02 RX ADMIN — Medication 5 MILLIGRAM(S): at 05:47

## 2022-09-02 RX ADMIN — Medication 650 MILLIGRAM(S): at 05:47

## 2022-09-02 RX ADMIN — Medication 650 MILLIGRAM(S): at 00:00

## 2022-09-02 RX ADMIN — GABAPENTIN 300 MILLIGRAM(S): 400 CAPSULE ORAL at 13:50

## 2022-09-02 NOTE — DISCHARGE NOTE NURSING/CASE MANAGEMENT/SOCIAL WORK - PATIENT PORTAL LINK FT
You can access the FollowMyHealth Patient Portal offered by [facility] by registering at the following website: [email or web address]. By joining [employer]’s FollowMyHealth portal, you will also be able to view your health information using other applications (apps) compatible with our system.

## 2022-09-02 NOTE — DISCHARGE NOTE PROVIDER - NSDCCPCAREPLAN_GEN_ALL_CORE_FT
PRINCIPAL DISCHARGE DIAGNOSIS  Diagnosis: Acute-on-chronic kidney injury  Assessment and Plan of Treatment: Valcyclovir stopped, changed to acyclovir.  IVF NS given to resolve SHER, Cr now at baseline.  Seen by Renal.      SECONDARY DISCHARGE DIAGNOSES  Diagnosis: Change in mental status  Assessment and Plan of Treatment: Resolved    Diagnosis: Shingles  Assessment and Plan of Treatment: Seen by ID, recommended change rom valcyclovir to acyclovir.  Gabapentin, lidocaine patches, and tramadol gievn for pain.

## 2022-09-02 NOTE — PROGRESS NOTE ADULT - SUBJECTIVE AND OBJECTIVE BOX
Patient is a 87y old  Female who presents with a chief complaint of SHER (01 Sep 2022 15:47)    Patient seen in follow up for SHER on CKD.        PAST MEDICAL HISTORY:  Hypertension    Hyperlipemia    Diabetes Mellitus Type II    GERD (Gastroesophageal Reflux Disease)    Lung Cancer    Irritable bowel syndrome with diarrhea    Orthostatic hypotension    Chronic kidney disease, unspecified CKD stage    Anemia    Rectal bleeding    Diverticulosis    H/O esophagitis    Hernia, hiatal    Hemorrhoids      MEDICATIONS  (STANDING):  acyclovir IVPB 250 milliGRAM(s) IV Intermittent every 24 hours  amLODIPine   Tablet 5 milliGRAM(s) Oral daily  atorvastatin 20 milliGRAM(s) Oral at bedtime  cyanocobalamin 1000 MICROGram(s) Oral daily  dextrose 5%. 1000 milliLiter(s) (50 mL/Hr) IV Continuous <Continuous>  dextrose 5%. 1000 milliLiter(s) (100 mL/Hr) IV Continuous <Continuous>  dextrose 50% Injectable 25 Gram(s) IV Push once  dextrose 50% Injectable 12.5 Gram(s) IV Push once  dextrose 50% Injectable 25 Gram(s) IV Push once  gabapentin 300 milliGRAM(s) Oral daily  glucagon  Injectable 1 milliGRAM(s) IntraMuscular once  heparin   Injectable 5000 Unit(s) SubCutaneous every 8 hours  influenza  Vaccine (HIGH DOSE) 0.7 milliLiter(s) IntraMuscular once  insulin lispro (ADMELOG) corrective regimen sliding scale   SubCutaneous three times a day before meals  lidocaine   4% Patch 1 Patch Transdermal daily  lidocaine   4% Patch 1 Patch Transdermal daily  magnesium oxide 400 milliGRAM(s) Oral daily  oxybutynin 5 milliGRAM(s) Oral two times a day  pantoprazole    Tablet 40 milliGRAM(s) Oral before breakfast  sodium bicarbonate 650 milliGRAM(s) Oral four times a day  sodium chloride 0.9%. 1000 milliLiter(s) (60 mL/Hr) IV Continuous <Continuous>  traMADol 25 milliGRAM(s) Oral four times a day  traZODone 150 milliGRAM(s) Oral at bedtime    MEDICATIONS  (PRN):  dextrose Oral Gel 15 Gram(s) Oral once PRN Blood Glucose LESS THAN 70 milliGRAM(s)/deciliter  traMADol 50 milliGRAM(s) Oral two times a day PRN severe breakthrough pain    T(C): 36.4 (22 @ 05:04), Max: 36.9 (22 @ 21:29)  HR: 83 (22 @ 06:45) (74 - 90)  BP: 159/75 (22 @ 06:45) (126/55 - 177/69)  RR: 18 (22 @ 05:04)  SpO2: 94% (22 @ 05:04)  Wt(kg): --  I&O's Detail    01 Sep 2022 07:01  -  02 Sep 2022 07:00  --------------------------------------------------------  IN:  Total IN: 0 mL    OUT:    Voided (mL): 3 mL  Total OUT: 3 mL    Total NET: -3 mL          PHYSICAL EXAM:  General: No distress  Respiratory: b/l air entry  Cardiovascular: S1 S2  Gastrointestinal: soft  Extremities: no edema                            LABORATORY:                        8.6    7.09  )-----------( 287      ( 02 Sep 2022 07:38 )             27.9         142  |  112<H>  |  55<H>  ----------------------------<  105<H>  4.5   |  23  |  4.00<H>    Ca    8.3<L>      02 Sep 2022 07:38  Mg     4.8         TPro  7.1  /  Alb  3.8  /  TBili  0.4  /  DBili  x   /  AST  21  /  ALT  16  /  AlkPhos  49      Sodium, Serum: 142 mmol/L ( @ 07:38)  Sodium, Serum: 140 mmol/L ( @ 04:57)  Sodium, Serum: 141 mmol/L ( @ 04:57)  Sodium, Serum: 137 mmol/L ( @ 11:55)    Potassium, Serum: 4.5 mmol/L ( @ 07:38)  Potassium, Serum: 4.5 mmol/L ( @ 04:57)  Potassium, Serum: 4.5 mmol/L ( @ 04:57)  Potassium, Serum: 5.0 mmol/L ( @ 11:55)    Hemoglobin: 8.6 g/dL ( @ 07:38)  Hemoglobin: 8.3 g/dL ( @ 04:57)  Hemoglobin: 10.5 g/dL ( @ 11:55)    Creatinine, Serum 4.00 ( @ 07:38)  Creatinine, Serum 4.40 ( @ 04:57)  Creatinine, Serum 5.10 ( @ 11:55)        LIVER FUNCTIONS - ( 31 Aug 2022 11:55 )  Alb: 3.8 g/dL / Pro: 7.1 g/dL / ALK PHOS: 49 U/L / ALT: 16 U/L / AST: 21 U/L / GGT: x           Urinalysis Basic - ( 31 Aug 2022 13:39 )    Color: Yellow / Appearance: Clear / S.010 / pH: x  Gluc: x / Ketone: Trace  / Bili: Negative / Urobili: Negative   Blood: x / Protein: 100 / Nitrite: Negative   Leuk Esterase: Negative / RBC: 0-2 /HPF / WBC 0-2   Sq Epi: x / Non Sq Epi: Occasional / Bacteria: x

## 2022-09-02 NOTE — DISCHARGE NOTE PROVIDER - HOSPITAL COURSE
HPI:  88 y/o female with PMH of DM2, CKD, HTN, HLD, GERD presents to the ED with dizziness and disorientation of 3 days. This started on saturday when the patient called the daughter thinking she was having an allergic reaction because of a rash that appeared on her chest/back. After going to urgent care that day, she was diagnosed with shingles and prescribed Valtrex. Later that day she took advil and vicodin because of the pain from the rash, and became disoriented and sleepy. This persisted for the next 2 days, and she continued to take 2 advils every 4 hours until this AM, her physician called her and recommended going to the ER immediately out of concern for severe AMS. Of note, patient reports extremely poor oral intake of food/hydration during this time as she felt "too out of it to go to the kitchen". She has had episodes of vertigo in the past, but nothing like this before.     In the ED  Vitals: 140/84; HR 89, RR 16, T 97.7, o2 99%  Labs significant for WBC 13.4, Hb 10.5, BUN/Cr 67/5.1, eGFR 8   Received: 3L NS, 300mg Gabapentin    Imaging:  CT Head  No acute hemorrhage or mass effect   Chest Xray  Small left midlung infiltrate; no cardiac findings  (31 Aug 2022 15:01)    HOSPITAL COURSE:    Pt presented with SHER, suspected possibly to be 2/2 valcyclovir she had been on for her shingles.  Valcyclovir was discontinued, patient was seen by ID, and changed to acyclovir daily dosing.  Patient was also seen by renal, who recommended IVF, which patient received for her SHER, Cr then downtrended to her baseline.  Pain from shingles lesions was controlled with gabapentin, lidocaine patches, and tramadol.  Patient was also evaluated by PT for weakness, but was determined to not have any skilled needs.  Patient was stable on day of discharge, and was to follow up with Renal, ID, and PMD in outpt setting.     VITAL SIGNS:    Vital Signs Last 24 Hrs  T(C): 36.4 (02 Sep 2022 05:04), Max: 36.9 (01 Sep 2022 21:29)  T(F): 97.5 (02 Sep 2022 05:04), Max: 98.4 (01 Sep 2022 21:29)  HR: 83 (02 Sep 2022 06:45) (74 - 83)  BP: 159/75 (02 Sep 2022 06:45) (126/55 - 177/69)  BP(mean): 103 (02 Sep 2022 06:45) (103 - 103)  RR: 18 (02 Sep 2022 05:04) (16 - 18)  SpO2: 94% (02 Sep 2022 05:04) (94% - 97%)    Parameters below as of 02 Sep 2022 05:04  Patient On (Oxygen Delivery Method): room air        PHYSICAL EXAM:     GENERAL: no acute distress  HEENT: NC/AT, EOMI, neck supple, MMM  RESPIRATORY: LCTAB/L, no rhonchi, rales, or wheezing  CARDIOVASCULAR: RRR, no murmurs, gallops, rubs  ABDOMINAL: soft, non-tender, non-distended, positive bowel sounds   EXTREMITIES: no clubbing, cyanosis, or edema  NEUROLOGICAL: alert and oriented x 3, non-focal  SKIN: rash of erythematous lesions on back and chest  MUSCULOSKELETAL: no gross joint deformity                          8.6    7.09  )-----------( 287      ( 02 Sep 2022 07:38 )             27.9     09-02    142  |  112<H>  |  55<H>  ----------------------------<  105<H>  4.5   |  23  |  4.00<H>    Ca    8.3<L>      02 Sep 2022 07:38  Mg     4.8     08-31    TPro  7.1  /  Alb  3.8  /  TBili  0.4  /  DBili  x   /  AST  21  /  ALT  16  /  AlkPhos  49  08-31

## 2022-09-02 NOTE — PROGRESS NOTE ADULT - SUBJECTIVE AND OBJECTIVE BOX
[facility] DIVISION of INFECTIOUS DISEASE  Isaac Rodriguez MD PhD, Courtney Vasquez MD, Tami Seals MD, Jeremias Morales MD, Andreas Khalil MD  and providing coverage with Annette Vazquez MD  Providing Infectious Disease Consultations at [facility], [facility], [facility]'s    Office# [phone] to schedule follow up appointments  Answering Service for urgent calls or New Consults [phone]  Cell# to text for urgent issues Isaac Rodriguez [phone]     infectious diseases progress note:    MONIKA AGEE is a 87y y. o. Female patient    Overnight and events of the last 24hrs reviewed    Allergies    No Known Allergies    Intolerances        ANTIBIOTICS/RELEVANT:  antimicrobials  acyclovir IVPB 250 milliGRAM(s) IV Intermittent every 24 hours    immunologic:  influenza  Vaccine (HIGH DOSE) 0.7 milliLiter(s) IntraMuscular once    OTHER:  amLODIPine   Tablet 5 milliGRAM(s) Oral daily  atorvastatin 20 milliGRAM(s) Oral at bedtime  cyanocobalamin 1000 MICROGram(s) Oral daily  dextrose 5%. 1000 milliLiter(s) IV Continuous <Continuous>  dextrose 5%. 1000 milliLiter(s) IV Continuous <Continuous>  dextrose 50% Injectable 25 Gram(s) IV Push once  dextrose 50% Injectable 12.5 Gram(s) IV Push once  dextrose 50% Injectable 25 Gram(s) IV Push once  dextrose Oral Gel 15 Gram(s) Oral once PRN  gabapentin 300 milliGRAM(s) Oral daily  glucagon  Injectable 1 milliGRAM(s) IntraMuscular once  heparin   Injectable 5000 Unit(s) SubCutaneous every 8 hours  insulin lispro (ADMELOG) corrective regimen sliding scale   SubCutaneous three times a day before meals  lidocaine   4% Patch 1 Patch Transdermal daily  lidocaine   4% Patch 1 Patch Transdermal daily  magnesium oxide 400 milliGRAM(s) Oral daily  oxybutynin 5 milliGRAM(s) Oral two times a day  pantoprazole    Tablet 40 milliGRAM(s) Oral before breakfast  sodium bicarbonate 650 milliGRAM(s) Oral four times a day  sodium chloride 0.9%. 1000 milliLiter(s) IV Continuous <Continuous>  traMADol 25 milliGRAM(s) Oral four times a day  traMADol 50 milliGRAM(s) Oral two times a day PRN  traZODone 150 milliGRAM(s) Oral at bedtime      Objective:  Vital Signs Last 24 Hrs  T(C): 36.4 (02 Sep 2022 05:04), Max: 36.9 (01 Sep 2022 21:29)  T(F): 97.5 (02 Sep 2022 05:04), Max: 98.4 (01 Sep 2022 21:29)  HR: 83 (02 Sep 2022 06:45) (74 - 83)  BP: 159/75 (02 Sep 2022 06:45) (157/70 - 177/69)  BP(mean): 103 (02 Sep 2022 06:45) (103 - 103)  RR: 18 (02 Sep 2022 05:04) (16 - 18)  SpO2: 94% (02 Sep 2022 05:04) (94% - 97%)    Parameters below as of 02 Sep 2022 05:04  Patient On (Oxygen Delivery Method): room air        T(C): 36.4 (22 @ 05:04), Max: 36.9 (22 @ 21:29)  T(C): 36.4 (22 @ 05:04), Max: 36.9 (22 @ 21:29)  T(C): 36.4 (22 @ 05:04), Max: 36.9 (22 @ 21:29)    PHYSICAL EXAM:  HEENT: NC atraumatic  Neck: supple  Respiratory: no accessory muscle use, breathing comfortably  Cardiovascular: distant  Gastrointestinal: normal appearing, nondistended  Extremities: no clubbing, no cyanosis,  Skin: rash much improved        LABS:                          8.6    7.09  )-----------( 287      ( 02 Sep 2022 07:38 )             27.9       WBC  7.09  @ 07:38  6.64  @ 04:57  13.41 08-31 @ 11:55          142  |  112<H>  |  55<H>  ----------------------------<  105<H>  4.5   |  23  |  4.00<H>    Ca    8.3<L>      02 Sep 2022 07:38        Creatinine, Serum: 4.00 mg/dL (22 @ 07:38)  Creatinine, Serum: 4.40 mg/dL (22 @ 04:57)  Creatinine, Serum: 4.40 mg/dL (22 @ 04:57)  Creatinine, Serum: 5.10 mg/dL (22 @ 11:55)        Urinalysis Basic - ( 31 Aug 2022 13:39 )    Color: Yellow / Appearance: Clear / S.010 / pH: x  Gluc: x / Ketone: Trace  / Bili: Negative / Urobili: Negative   Blood: x / Protein: 100 / Nitrite: Negative   Leuk Esterase: Negative / RBC: 0-2 /HPF / WBC 0-2   Sq Epi: x / Non Sq Epi: Occasional / Bacteria: x            INFLAMMATORY MARKERS  Auto Neutrophil #: 12.20 K/uL (22 @ 11:55)  Auto Lymphocyte #: 0.40 K/uL (22 @ 11:55)      Auto Eosinophil #: 0.00 K/uL (22 @ 11:55)                                MICROBIOLOGY:              RADIOLOGY & ADDITIONAL STUDIES:

## 2022-09-02 NOTE — DISCHARGE NOTE PROVIDER - PROVIDER TOKENS
PROVIDER:[TOKEN:[[id number]],FOLLOWUP:[1 week]],PROVIDER:[TOKEN:[[id number]],FOLLOWUP:[1 week]]

## 2022-09-02 NOTE — DISCHARGE NOTE NURSING/CASE MANAGEMENT/SOCIAL WORK - NSDCVIVACCINE_GEN_ALL_CORE_FT
Tdap; 08-Jul-2016 16:15; Darby Chaudhari (RN); [employer]; [id number]; IntraMuscular; Deltoid Right.; 0.5 milliLiter(s); VIS (VIS Published: 09-May-2013, VIS Presented: 08-Jul-2016);   Tdap; 17-Nov-2017 22:40; Conrad Ordaz (RN); [employer]; [id number]; IntraMuscular; Deltoid Right.; 0.5 milliLiter(s); VIS (VIS Published: 09-May-2013, VIS Presented: 17-Nov-2017);

## 2022-09-02 NOTE — DISCHARGE NOTE PROVIDER - NSDCMRMEDTOKEN_GEN_ALL_CORE_FT
amLODIPine 5 mg oral tablet: 1 tab(s) orally once a day  gabapentin 300 mg oral capsule: 1 cap(s) orally once a day  Januvia 50 mg oral tablet: 1 tab(s) orally once a day  lidocaine 4% topical film: Apply topically to affected area once a day  lidocaine 4% topical film: Apply topically to affected area once a day  magnesium gluconate 500 mg oral tablet: 1 tab(s) orally once a day  omeprazole 20 mg oral delayed release capsule: 1 cap(s) orally once a day  paricalcitol 1 mcg oral capsule: 1 tab(s) orally 3 times a week; Mon, Wed, Fri  Procrit: 1 dose(s) injectable every 6 weeks, As Needed  rosuvastatin 5 mg oral tablet: 2.5 milligram(s) orally once a day  sodium bicarbonate 650 mg oral tablet: 1 tab(s) orally 4 times a day  solifenacin 5 mg oral tablet: 1 tab(s) orally once a day  traMADol 50 mg oral tablet: 0.5 tab(s) orally 4 times a day  traZODone 150 mg oral tablet: 1 tab(s) orally once a day (at bedtime)  Vitamin B12 1000 mcg oral tablet: 1 tab(s) orally once a day   amLODIPine 5 mg oral tablet: 1 tab(s) orally once a day  gabapentin 300 mg oral capsule: 1 cap(s) orally once a day MDD:300mg  gabapentin 300 mg oral capsule: 1 cap(s) orally once a day  Januvia 50 mg oral tablet: 1 tab(s) orally once a day  magnesium gluconate 500 mg oral tablet: 1 tab(s) orally once a day  omeprazole 20 mg oral delayed release capsule: 1 cap(s) orally once a day  paricalcitol 1 mcg oral capsule: 1 tab(s) orally 3 times a week; Mon, Wed, Fri  Procrit: 1 dose(s) injectable every 6 weeks, As Needed  rosuvastatin 5 mg oral tablet: 2.5 milligram(s) orally once a day  sodium bicarbonate 650 mg oral tablet: 1 tab(s) orally 4 times a day  solifenacin 5 mg oral tablet: 1 tab(s) orally once a day  traMADol 50 mg oral tablet: 0.5 tab(s) orally 4 times a day  traMADol 50 mg oral tablet: 0.5 tab(s) orally 4 times a day MDD:100mg  traZODone 150 mg oral tablet: 1 tab(s) orally once a day (at bedtime)  Vitamin B12 1000 mcg oral tablet: 1 tab(s) orally once a day

## 2024-01-12 NOTE — H&P ADULT - ASSESSMENT
Statement Selected
88 y/o female with PMH GERD, HTN, HLD, DM, CKD p/w 3 day history of rash and AMS, found to have shingles and admitted for SHER

## 2024-06-22 NOTE — PATIENT PROFILE ADULT - HAS THE PATIENT USED TOBACCO IN THE PAST 30 DAYS?
Pt now able to grasp hands, give thumbs up and wiggle toes to command. Continues with nonpurposeful jerking movements as well. ICU team aware, sedation ordered.    No

## 2024-08-26 NOTE — PROGRESS NOTE ADULT - PROBLEM SELECTOR PROBLEM 6
Atorvastatin 20 mg  Fluoxetine 20 mg    Pharmacy changed.  Remaining refills sent to new pharmacy.   Chronic kidney disease, unspecified CKD stage

## 2024-09-19 NOTE — PROGRESS NOTE ADULT - ASSESSMENT
SHER pn CKD 4: Prerenal azotemia, NSAID nephropathy, Valacyclovir toxicity  Hypertension  Shingles  Anemia; on procrit as out pt    08/31/22: D/c valtrex. IV hydration. Monitor BP trend. Titrate BP meds as needed. Salt restriction. Continue retacrit as needed.   Avoid nephrotoxic meds as possible. Avoid ACEI, ARB, NSAIDs and IV contrast. Will follow electrolytes and renal function trend.   09/01/22: Improving renal indices. To continue current meds. Encourage PO intake as tolerated. 
SHER pn CKD 4: Prerenal azotemia, NSAID nephropathy, Valacyclovir toxicity  Hypertension  Shingles  Anemia; on procrit as out pt    08/31/22: D/c valtrex. IV hydration. Monitor BP trend. Titrate BP meds as needed. Salt restriction. Continue retacrit as needed.   Avoid nephrotoxic meds as possible. Avoid ACEI, ARB, NSAIDs and IV contrast. Will follow electrolytes and renal function trend.   09/01/22: Improving renal indices. To continue current meds. Encourage PO intake as tolerated.   09/02/22: Renal indices improved to baseline. To continue current meds. D/c planning. 
86 y/o female with PMH of DM2, CKD, HTN, HLD, GERD presents to the ED with dizziness and disorientation. This started on saturday 8/27. She then went to [facility] urgent care that day, diagnosed with shingles and prescribed Valtrex. Later that day she took advil and vicodin because of the pain from the rash, and became disoriented and sleepy. Her physician called her and recommended going to the ER immediately out of concern for severe AMS. Of note, patient reports extremely poor oral intake of food/hydration during this time as she felt "too out of it to go to the kitchen". She has had episodes of vertigo in the past, but nothing like this before.     RECOMMENDATIONS  Compelling for shingles (Zoster) for pain control agree with management with Gabapentin  at this point no sig role of antiviral Rx and higher risks of drug interations  do not recommend steroids in this context    From an ID standpoint no further requirement for inpatient status for the management of ID issues. Fine with discharge from ID standpoint when other medical issues no longer require inpatient care and social issues allow for a safe discharge plan. To schedule an outpatient ID follow up appointment please call our office at [phone]    Thank you for consulting us and involving us in the management of this most interesting and challenging case.  We will follow along in the care of this patient. Please call us at [phone] or text me directly on my cell# at [phone] with any concerns.  
86 y/o female with PMH of DM2, CKD, HTN, HLD, GERD presents to the ED with dizziness and disorientation. This started on saturday 8/27. She then went to [facility] urgent care that day, diagnosed with shingles and prescribed Valtrex. Later that day she took advil and vicodin because of the pain from the rash, and became disoriented and sleepy. Her physician called her and recommended going to the ER immediately out of concern for severe AMS. Of note, patient reports extremely poor oral intake of food/hydration during this time as she felt "too out of it to go to the kitchen". She has had episodes of vertigo in the past, but nothing like this before.     RECOMMENDATIONS  Compelling for shingles (Zoster) for pain control agree with management with Gabapentin  for antiviral Rx will do daily dosing with active rash  do not recommend steroids in this context  possible dc as early 9/2 if pt improving    Thank you for consulting us and involving us in the management of this most interesting and challenging case.  We will follow along in the care of this patient. Please call us at [phone] or text me directly on my cell# at [phone] with any concerns.  
86 y/o female with PMH GERD, HTN, HLD, DM, CKD p/w 3 day history of rash and AMS, found to have shingles and admitted for SHER 
[FreeTextEntry1] : Will order testing for Giardia. Will prescribe Flagyl to take once he has collected a stool sample.  Will prescribe Clotrimazole/betamethasone for his rash as above.  He will schedule a complete physical. He has has elevated cholesterol in the past.

## 2024-12-04 NOTE — PROGRESS NOTE ADULT - PROBLEM SELECTOR PROBLEM 5
AMBULATORY PROGRESS NOTE      Patient: Lucía Khan             MRN: [id number]     SSN: [id number] Body mass index is 28.43 kg/m².  YOB: 1994     AGE: 30 y.o.       EX: female    PCP: Marielena Pedroza,        IMPRESSION //  DIAGNOSIS AND TREATMENT PLAN      Lucía Khan has a diagnosis of:      DIAGNOSES    1. Chronic instability of ankle    2. Bilateral foot pain    3. Bilateral ankle pain, unspecified chronicity    4. Plantar fasciitis of left foot          PLAN:    1. PT referral for ankle instability bilateral and left plantar fasciitis   2. Spenco total max arch support orthotics    RTO 7-8 weeks    Orders Placed This Encounter    AMB POC XRAY, ANKLE; 2 VIEWS     Order Specific Question:   Are you Pregnant?     Answer:   No    AMB POC XRAY, FOOT; COMPLETE, 3+ VIEW     Order Specific Question:   Are you Pregnant?     Answer:   No    AMB POC XRAY, ANKLE; 2 VIEWS     Order Specific Question:   Are you Pregnant?     Answer:   No    AMB POC XRAY, FOOT; COMPLETE, 3+ VIEW     Order Specific Question:   Are you Pregnant?     Answer:   No    [facility] - [facility] [place] [place]     Referral Priority:   Routine     Referral Type:   Eval and Treat     Referral Reason:   Specialty Services Required     Requested Specialty:   Physical Therapy     Number of Visits Requested:   1        Patient Instructions   Please consider purchasing Spenco Total Max Arch Support. This can be bought on [employer] or on [email or web address]. They range ~$40-$50 per pair.       Please follow up with your PCP for any health maintenance as recommended.         Lucía Khan  expresses understanding of the diagnosis, treatment plan, and all of their proposed questions were answered to their satisfaction. Patient education has been provided re the diagnoses.       Patient is an active smoker      HPI //  OBJECTIVE EXAMINATION      Lucía Khan IS A 30 y.o. female who is a/an  new  Essential hypertension

## 2025-05-12 NOTE — ED ADULT TRIAGE NOTE - BRAND OF COVID-19 VACCINATION
Verified patient. HD tx started without any problems. Lines visible and secured. Stable during HD tx session. UF net 0L total fluid removed. Completed HD prescribed tx time. Report given to primary RN. Stable  
Moderna dose 1, 2, and 3
No